# Patient Record
Sex: MALE | Race: BLACK OR AFRICAN AMERICAN | NOT HISPANIC OR LATINO | Employment: OTHER | URBAN - METROPOLITAN AREA
[De-identification: names, ages, dates, MRNs, and addresses within clinical notes are randomized per-mention and may not be internally consistent; named-entity substitution may affect disease eponyms.]

---

## 2017-01-02 ENCOUNTER — LAB VISIT (OUTPATIENT)
Dept: LAB | Facility: HOSPITAL | Age: 67
End: 2017-01-02
Attending: FAMILY MEDICINE
Payer: MEDICARE

## 2017-01-02 DIAGNOSIS — I63.50 UNSPECIFIED CEREBRAL ARTERY OCCLUSION WITH CEREBRAL INFARCTION: ICD-10-CM

## 2017-01-02 DIAGNOSIS — D72.829 LEUKOCYTOSIS: Primary | ICD-10-CM

## 2017-01-02 LAB
BASOPHILS # BLD AUTO: 0 K/UL
BASOPHILS NFR BLD: 0.2 %
DIFFERENTIAL METHOD: ABNORMAL
EOSINOPHIL # BLD AUTO: 0 K/UL
EOSINOPHIL NFR BLD: 0.2 %
ERYTHROCYTE [DISTWIDTH] IN BLOOD BY AUTOMATED COUNT: 17.9 %
HCT VFR BLD AUTO: 33.8 %
HGB BLD-MCNC: 10.8 G/DL
LYMPHOCYTES # BLD AUTO: 3.6 K/UL
LYMPHOCYTES NFR BLD: 16.7 %
MCH RBC QN AUTO: 26.3 PG
MCHC RBC AUTO-ENTMCNC: 31.8 %
MCV RBC AUTO: 83 FL
MONOCYTES # BLD AUTO: 0.4 K/UL
MONOCYTES NFR BLD: 1.8 %
NEUTROPHILS # BLD AUTO: 17.4 K/UL
NEUTROPHILS NFR BLD: 81.1 %
PLATELET # BLD AUTO: 254 K/UL
PMV BLD AUTO: 8.9 FL
RBC # BLD AUTO: 4.09 M/UL
WBC # BLD AUTO: 21.5 K/UL

## 2017-01-02 PROCEDURE — 36415 COLL VENOUS BLD VENIPUNCTURE: CPT

## 2017-01-02 PROCEDURE — 85025 COMPLETE CBC W/AUTO DIFF WBC: CPT

## 2017-02-02 ENCOUNTER — HOSPITAL ENCOUNTER (INPATIENT)
Facility: HOSPITAL | Age: 67
LOS: 12 days | DRG: 870 | End: 2017-02-14
Attending: EMERGENCY MEDICINE | Admitting: INTERNAL MEDICINE
Payer: MEDICARE

## 2017-02-02 DIAGNOSIS — R53.2 FUNCTIONAL QUADRIPLEGIA: ICD-10-CM

## 2017-02-02 DIAGNOSIS — J18.9 PNEUMONIA OF BOTH LOWER LOBES DUE TO INFECTIOUS ORGANISM: ICD-10-CM

## 2017-02-02 DIAGNOSIS — J96.22 ACUTE ON CHRONIC RESPIRATORY FAILURE WITH HYPERCAPNIA: ICD-10-CM

## 2017-02-02 DIAGNOSIS — Z93.0 TRACHEOSTOMY IN PLACE: ICD-10-CM

## 2017-02-02 DIAGNOSIS — G93.1 ANOXIC BRAIN INJURY: ICD-10-CM

## 2017-02-02 DIAGNOSIS — J86.0 TRACHEO-ESOPHAGEAL FISTULA: ICD-10-CM

## 2017-02-02 DIAGNOSIS — Z93.4 JEJUNOSTOMY TUBE IN SITU: ICD-10-CM

## 2017-02-02 DIAGNOSIS — A41.9 SEPSIS, DUE TO UNSPECIFIED ORGANISM: Primary | ICD-10-CM

## 2017-02-02 DIAGNOSIS — J18.9 HCAP (HEALTHCARE-ASSOCIATED PNEUMONIA): ICD-10-CM

## 2017-02-02 DIAGNOSIS — J18.9 PNEUMONIA: ICD-10-CM

## 2017-02-02 DIAGNOSIS — J15.5 PNEUMONIA DUE TO ESCHERICHIA COLI: ICD-10-CM

## 2017-02-02 LAB
ALBUMIN SERPL BCP-MCNC: 2.5 G/DL
ALP SERPL-CCNC: 130 U/L
ALT SERPL W/O P-5'-P-CCNC: 47 U/L
ANION GAP SERPL CALC-SCNC: 9 MMOL/L
ANISOCYTOSIS BLD QL SMEAR: SLIGHT
AST SERPL-CCNC: 35 U/L
BACTERIA #/AREA URNS HPF: ABNORMAL /HPF
BASOPHILS NFR BLD: 0 %
BILIRUB SERPL-MCNC: 1.7 MG/DL
BILIRUB UR QL STRIP: NEGATIVE
BNP SERPL-MCNC: 137 PG/ML
BUN SERPL-MCNC: 40 MG/DL
CALCIUM SERPL-MCNC: 8.7 MG/DL
CHLORIDE SERPL-SCNC: 106 MMOL/L
CLARITY UR: ABNORMAL
CO2 SERPL-SCNC: 25 MMOL/L
COLOR UR: YELLOW
CREAT SERPL-MCNC: 0.8 MG/DL
DIFFERENTIAL METHOD: ABNORMAL
EOSINOPHIL NFR BLD: 0 %
ERYTHROCYTE [DISTWIDTH] IN BLOOD BY AUTOMATED COUNT: 18.7 %
EST. GFR  (AFRICAN AMERICAN): >60 ML/MIN/1.73 M^2
EST. GFR  (NON AFRICAN AMERICAN): >60 ML/MIN/1.73 M^2
GIANT PLATELETS BLD QL SMEAR: PRESENT
GLUCOSE SERPL-MCNC: 132 MG/DL
GLUCOSE UR QL STRIP: NEGATIVE
GRAN CASTS #/AREA URNS LPF: 5 /LPF
HCT VFR BLD AUTO: 31.7 %
HGB BLD-MCNC: 9.8 G/DL
HGB UR QL STRIP: ABNORMAL
HYALINE CASTS #/AREA URNS LPF: 5 /LPF
HYPOCHROMIA BLD QL SMEAR: ABNORMAL
KETONES UR QL STRIP: NEGATIVE
LACTATE SERPL-SCNC: 1.5 MMOL/L
LEUKOCYTE ESTERASE UR QL STRIP: NEGATIVE
LYMPHOCYTES NFR BLD: 15 %
MCH RBC QN AUTO: 25.7 PG
MCHC RBC AUTO-ENTMCNC: 31 %
MCV RBC AUTO: 83 FL
METAMYELOCYTES NFR BLD MANUAL: 3 %
MICROSCOPIC COMMENT: ABNORMAL
MONOCYTES NFR BLD: 3 %
NEUTROPHILS NFR BLD: 64 %
NEUTS BAND NFR BLD MANUAL: 15 %
NITRITE UR QL STRIP: NEGATIVE
PH UR STRIP: 6 [PH] (ref 5–8)
PLATELET # BLD AUTO: 236 K/UL
PLATELET BLD QL SMEAR: ABNORMAL
PMV BLD AUTO: 8.6 FL
POTASSIUM SERPL-SCNC: 4.1 MMOL/L
PROT SERPL-MCNC: 8.6 G/DL
PROT UR QL STRIP: ABNORMAL
RBC # BLD AUTO: 3.83 M/UL
RBC #/AREA URNS HPF: 10 /HPF (ref 0–4)
SODIUM SERPL-SCNC: 140 MMOL/L
SP GR UR STRIP: 1.01 (ref 1–1.03)
URN SPEC COLLECT METH UR: ABNORMAL
UROBILINOGEN UR STRIP-ACNC: ABNORMAL EU/DL
WBC # BLD AUTO: 18.5 K/UL
WBC #/AREA URNS HPF: 3 /HPF (ref 0–5)

## 2017-02-02 PROCEDURE — 93005 ELECTROCARDIOGRAM TRACING: CPT

## 2017-02-02 PROCEDURE — 36415 COLL VENOUS BLD VENIPUNCTURE: CPT

## 2017-02-02 PROCEDURE — 96365 THER/PROPH/DIAG IV INF INIT: CPT

## 2017-02-02 PROCEDURE — 96368 THER/DIAG CONCURRENT INF: CPT

## 2017-02-02 PROCEDURE — 85007 BL SMEAR W/DIFF WBC COUNT: CPT

## 2017-02-02 PROCEDURE — 5A1955Z RESPIRATORY VENTILATION, GREATER THAN 96 CONSECUTIVE HOURS: ICD-10-PCS | Performed by: INTERNAL MEDICINE

## 2017-02-02 PROCEDURE — 99223 1ST HOSP IP/OBS HIGH 75: CPT | Mod: AI,,, | Performed by: INTERNAL MEDICINE

## 2017-02-02 PROCEDURE — 63600175 PHARM REV CODE 636 W HCPCS: Performed by: INTERNAL MEDICINE

## 2017-02-02 PROCEDURE — 94761 N-INVAS EAR/PLS OXIMETRY MLT: CPT

## 2017-02-02 PROCEDURE — 83605 ASSAY OF LACTIC ACID: CPT

## 2017-02-02 PROCEDURE — 63600175 PHARM REV CODE 636 W HCPCS: Performed by: EMERGENCY MEDICINE

## 2017-02-02 PROCEDURE — 85027 COMPLETE CBC AUTOMATED: CPT

## 2017-02-02 PROCEDURE — 87040 BLOOD CULTURE FOR BACTERIA: CPT | Mod: 59

## 2017-02-02 PROCEDURE — 94002 VENT MGMT INPAT INIT DAY: CPT

## 2017-02-02 PROCEDURE — 80053 COMPREHEN METABOLIC PANEL: CPT

## 2017-02-02 PROCEDURE — 83880 ASSAY OF NATRIURETIC PEPTIDE: CPT

## 2017-02-02 PROCEDURE — 20000000 HC ICU ROOM

## 2017-02-02 PROCEDURE — 25000003 PHARM REV CODE 250: Performed by: EMERGENCY MEDICINE

## 2017-02-02 PROCEDURE — 27000221 HC OXYGEN, UP TO 24 HOURS

## 2017-02-02 PROCEDURE — 99285 EMERGENCY DEPT VISIT HI MDM: CPT | Mod: 25

## 2017-02-02 PROCEDURE — 81000 URINALYSIS NONAUTO W/SCOPE: CPT

## 2017-02-02 PROCEDURE — 96372 THER/PROPH/DIAG INJ SC/IM: CPT

## 2017-02-02 PROCEDURE — 87086 URINE CULTURE/COLONY COUNT: CPT

## 2017-02-02 RX ORDER — MEROPENEM AND SODIUM CHLORIDE 500 MG/50ML
500 INJECTION, SOLUTION INTRAVENOUS
Status: COMPLETED | OUTPATIENT
Start: 2017-02-02 | End: 2017-02-02

## 2017-02-02 RX ORDER — ENOXAPARIN SODIUM 100 MG/ML
40 INJECTION SUBCUTANEOUS EVERY 24 HOURS
Status: DISCONTINUED | OUTPATIENT
Start: 2017-02-02 | End: 2017-02-02 | Stop reason: SDUPTHER

## 2017-02-02 RX ORDER — CEFEPIME HYDROCHLORIDE 2 G/50ML
2 INJECTION, SOLUTION INTRAVENOUS
Status: COMPLETED | OUTPATIENT
Start: 2017-02-02 | End: 2017-02-02

## 2017-02-02 RX ORDER — ENOXAPARIN SODIUM 100 MG/ML
40 INJECTION SUBCUTANEOUS EVERY 24 HOURS
Status: DISCONTINUED | OUTPATIENT
Start: 2017-02-03 | End: 2017-02-14 | Stop reason: HOSPADM

## 2017-02-02 RX ORDER — MEROPENEM AND SODIUM CHLORIDE 1 G/50ML
1 INJECTION, SOLUTION INTRAVENOUS
Status: DISCONTINUED | OUTPATIENT
Start: 2017-02-03 | End: 2017-02-09

## 2017-02-02 RX ORDER — SODIUM CHLORIDE 9 MG/ML
INJECTION, SOLUTION INTRAVENOUS CONTINUOUS
Status: DISCONTINUED | OUTPATIENT
Start: 2017-02-02 | End: 2017-02-04

## 2017-02-02 RX ORDER — CIPROFLOXACIN 2 MG/ML
400 INJECTION, SOLUTION INTRAVENOUS
Status: DISCONTINUED | OUTPATIENT
Start: 2017-02-02 | End: 2017-02-04

## 2017-02-02 RX ORDER — BUTYROSPERMUM PARKII(SHEA BUTTER), SIMMONDSIA CHINENSIS (JOJOBA) SEED OIL, ALOE BARBADENSIS LEAF EXTRACT .01; 1; 3.5 G/100G; G/100G; G/100G
250 LIQUID TOPICAL 2 TIMES DAILY
COMMUNITY

## 2017-02-02 RX ADMIN — CEFEPIME HYDROCHLORIDE 2 G: 2 INJECTION, SOLUTION INTRAVENOUS at 06:02

## 2017-02-02 RX ADMIN — SODIUM CHLORIDE: 0.9 INJECTION, SOLUTION INTRAVENOUS at 07:02

## 2017-02-02 RX ADMIN — GENTAMICIN SULFATE 356 MG: 40 INJECTION, SOLUTION INTRAMUSCULAR; INTRAVENOUS at 04:02

## 2017-02-02 RX ADMIN — MEROPENEM AND SODIUM CHLORIDE 500 MG: 500 INJECTION, SOLUTION INTRAVENOUS at 04:02

## 2017-02-02 RX ADMIN — ENOXAPARIN SODIUM 40 MG: 40 INJECTION SUBCUTANEOUS at 04:02

## 2017-02-02 RX ADMIN — VANCOMYCIN HYDROCHLORIDE 1000 MG: 1 INJECTION, POWDER, LYOPHILIZED, FOR SOLUTION INTRAVENOUS at 10:02

## 2017-02-02 RX ADMIN — SODIUM CHLORIDE 1000 ML: 0.9 INJECTION, SOLUTION INTRAVENOUS at 04:02

## 2017-02-02 RX ADMIN — CIPROFLOXACIN 400 MG: 2 INJECTION, SOLUTION INTRAVENOUS at 08:02

## 2017-02-02 NOTE — ED PROVIDER NOTES
Encounter Date: 2/2/2017    SCRIBE #1 NOTE: Bridgette RICH am scribing for, and in the presence of, Dr. Hernández.       History     Chief Complaint   Patient presents with    Pneumonia     possible aspiration pneumonia     Review of patient's allergies indicates:  No Known Allergies  HPI Comments: 2/2/2017  3:37 PM     Chief Complaint: Pneumonia    The patient is a 66 y.o. male with PMHx of DM, HTN, seizures, stroke, anoxic brain injury, anemia, CKD and ventilator dependent who presents with pneumonia. EMS reports patient had worsening chest XRs which showed bilateral pneumonia. Patient has a temperature of 100.8 degrees F. Patient was sent over for further evaluation of possible aspiration. Shx of tracheostomy tube placement, gastrostomy tube placement, jejunostomy.    The history is provided by the EMS personnel.     Past Medical History   Diagnosis Date    Anoxic brain injury 7/25/2015     during cardiac arrest    Chronic kidney disease     Decubitus ulcer of ankle, unstageable      outer right ankle    Decubitus ulcer of sacral region, stage 3     Decubitus ulcer, unstageable      left ear upper with blister to lower ear lobe    Diabetes mellitus      type II    Dissecting aneurysm of thoracoabdominal aorta 7/25/2015    Encounter for blood transfusion     H/O renal calculi     History of ATN      r/t hypoperfusion    Hyperlipidemia     Hypertension     Seizures     Severe anemia     Stroke 7/25/2015     bilateral watershed infarcts    Ventilator dependent      No past medical history pertinent negatives.  Past Surgical History   Procedure Laterality Date    Tracheostomy tube placement      Gastrostomy tube placement      Colonoscopy N/A 5/16/2016     Procedure: COLONOSCOPY;  Surgeon: Wei Moncada MD;  Location: [facility];  Service: Endoscopy;  Laterality: N/A;    Jejunostomy       History reviewed. No pertinent family history.  Social History   Substance Use Topics    Smoking status:  Never Smoker    Smokeless tobacco: None    Alcohol use No     Review of Systems   Unable to perform ROS: Patient nonverbal       Physical Exam   Initial Vitals   BP Pulse Resp Temp SpO2   02/02/17 1524 02/02/17 1524 02/02/17 1524 02/02/17 1524 02/02/17 1524   92/57 78 30 100.7 °F (38.2 °C) 97 %     Physical Exam    Nursing note and vitals reviewed.  Constitutional: He appears distressed (severe).   HENT:   Head: Normocephalic and atraumatic.   Mouth/Throat: Mucous membranes are normal.   Eyes: EOM are normal. Pupils are equal, round, and reactive to light.   Neck: A tracheostomy is present.   Pulmonary/Chest: He has rhonchi (bilateral rhonchus sounds).   Abdominal:   Multiple abdominal tubes.   Musculoskeletal: He exhibits no edema.   Contractures to all four extremities.   Neurological:   Not oriented. Pt is at mental baseline.   Skin: Skin is dry. No rash noted. No erythema.         ED Course   Critical Care  Date/Time: 2/5/2017 11:11 AM  Performed by: WYATT ALMEIDA III  Authorized by: QASIM JOLLEY   Direct patient critical care time: 20 minutes  Additional history critical care time: 5 minutes  Ordering / reviewing critical care time: 5 minutes  Documentation critical care time: 5 minutes  Consulting other physicians critical care time: 5 minutes  Total critical care time (exclusive of procedural time) : 40 minutes  Critical care was necessary to treat or prevent imminent or life-threatening deterioration of the following conditions: sepsis and shock.  Critical care was time spent personally by me on the following activities: discussions with consultants, evaluation of patient's response to treatment, obtaining history from patient or surrogate, ordering and review of laboratory studies, pulse oximetry, review of old charts, ventilator management, re-evaluation of patient's condition, ordering and review of radiographic studies, ordering and performing treatments and interventions, examination of patient,  interpretation of cardiac output measurements and development of treatment plan with patient or surrogate.  Comments: Pt required numerous evals of his shock-like status during his course in the ED for his life-threatening sepsis secondary to bilateral pneumonias.        Labs Reviewed   B-TYPE NATRIURETIC PEPTIDE - Abnormal; Notable for the following:        Result Value     (*)     All other components within normal limits   CBC W/ AUTO DIFFERENTIAL - Abnormal; Notable for the following:     WBC 18.50 (*)     RBC 3.83 (*)     Hemoglobin 9.8 (*)     Hematocrit 31.7 (*)     MCH 25.7 (*)     MCHC 31.0 (*)     RDW 18.7 (*)     MPV 8.6 (*)     Lymph% 15.0 (*)     Mono% 3.0 (*)     All other components within normal limits   COMPREHENSIVE METABOLIC PANEL - Abnormal; Notable for the following:     Glucose 132 (*)     BUN, Bld 40 (*)     Total Protein 8.6 (*)     Albumin 2.5 (*)     Total Bilirubin 1.7 (*)     ALT 47 (*)     All other components within normal limits   URINALYSIS - Abnormal; Notable for the following:     Appearance, UA Hazy (*)     Protein, UA 1+ (*)     Occult Blood UA 1+ (*)     Urobilinogen, UA 2.0-3.0 (*)     All other components within normal limits   URINALYSIS MICROSCOPIC - Abnormal; Notable for the following:     RBC, UA 10 (*)     Bacteria, UA Moderate (*)     Hyaline Casts, UA 5 (*)     Granular Casts, UA 5 (*)     All other components within normal limits   LACTIC ACID, PLASMA     EKG Readings: (Independently Interpreted)   Normal sinus rhythm with rate of 73. Normal axis and intervals. T wave inversions anteriorly. No st elevation or depression.          Medical Decision Making:   History:   I obtained history from: EMS provider.  Old Medical Records: I decided to obtain old medical records.  Initial Assessment:   Emilie Proctor Sr. is a 66 y.o. male who presents to the Emergency Department with worsening CXR from long term rehab facility with fever. Will admit to ICU for IV  antibiotics and sepsis management.   Differential Diagnosis:   Sepsis, aspiration, pneumonia, CHF.  Independently Interpreted Test(s):   I have ordered and independently interpreted X-rays - see summary below.       <> Summary of X-Ray Reading(s): CXR: bilateral infiltrative processes  I have ordered and independently interpreted EKG Reading(s) - see prior notes  Clinical Tests:   Lab Tests: Ordered and Reviewed  Radiological Study: Ordered  Medical Tests: Ordered  Other:   I have discussed this case with another health care provider.       <> Summary of the Discussion: IM            Scribe Attestation:   Scribe #1: I performed the above scribed service and the documentation accurately describes the services I performed. I attest to the accuracy of the note.    Attending Attestation:           Physician Attestation for Scribe:  Physician Attestation Statement for Scribe #1: I, Dr. Hernández, reviewed documentation, as scribed by Bridgette Norman in my presence, and it is both accurate and complete.                 ED Course     Clinical Impression:   The primary encounter diagnosis was Sepsis, due to unspecified organism. Diagnoses of Pneumonia, HCAP (healthcare-associated pneumonia), Acute on chronic respiratory failure with hypercapnia, Anoxic brain injury, and Pneumonia of both lower lobes due to infectious organism were also pertinent to this visit.          Casper Hernández III, MD  02/05/17 9622

## 2017-02-02 NOTE — SIGNIFICANT EVENT
Vent dependent patient received from [facility], placed on  vent with home settings, 7.0 XLT trach secure and sx'd for patency.  Alarms on and functioning, ambu at bedside.

## 2017-02-02 NOTE — H&P
"PCP: Marcial Avila MD    History & Physical    Chief Complaint: "Pneumonia"    History of Present Illness:  Patient is a 66 y.o. male admitted to Hospitalist Service from [facility] with complaint of "Pneumonia". Patient has PMH significant for anoxic brain injury, hypertension, seizures, hyperlipidemia, chronic respiratory failure, history of alcohol and drug abuse, history of CVA and DM-2. EMS reported patient had worsening chest XRs which showed bilateral pneumonia. Patient has a temperature of 100.8 degrees F. Patient was sent over for further evaluation of possible aspiration. Patient not able to communicate. Looks ill and exhausted.    Past Medical History   Diagnosis Date    Anoxic brain injury 7/25/2015     during cardiac arrest    Chronic kidney disease     Decubitus ulcer of ankle, unstageable      outer right ankle    Decubitus ulcer of sacral region, stage 3     Decubitus ulcer, unstageable      left ear upper with blister to lower ear lobe    Diabetes mellitus      type II    Dissecting aneurysm of thoracoabdominal aorta 7/25/2015    Encounter for blood transfusion     H/O renal calculi     History of ATN      r/t hypoperfusion    Hyperlipidemia     Hypertension     Seizures     Severe anemia     Stroke 7/25/2015     bilateral watershed infarcts    Ventilator dependent      Past Surgical History   Procedure Laterality Date    Tracheostomy tube placement      Gastrostomy tube placement      Colonoscopy N/A 5/16/2016     Procedure: COLONOSCOPY;  Surgeon: Wei Moncada MD;  Location: [facility];  Service: Endoscopy;  Laterality: N/A;    Jejunostomy       History reviewed. No pertinent family history.  Social History   Substance Use Topics    Smoking status: Never Smoker    Smokeless tobacco: None    Alcohol use No      Review of patient's allergies indicates:  No Known Allergies    (Not in a hospital admission)  Review of Systems: Unable to obtain due to patient's " condition.     OBJECTIVE:     Vital Signs (Most Recent)  Temp: (!) 100.7 °F (38.2 °C) (02/02/17 1524)  Pulse: 78 (02/02/17 1524)  Resp: (!) 30 (02/02/17 1524)  BP: (!) 92/57 (02/02/17 1524)  SpO2: 97 % (02/02/17 1524)    Physical Exam:  General appearance: well developed, appears stated age, chronically ill appearing  Head: normocephalic, atraumatic  Eyes: conjunctivae/corneas clear. PERRL.  Nose: Nares normal. Septum midline.  Throat: lips, mucosa, and tongue normal; teeth and gums normal, no throat erythema.  Neck: supple, symmetrical, trachea midline, no JVD and thyroid not enlarged, symmetric, no tenderness/mass/nodules. Trach noted.  Lungs: clear to auscultation bilaterally and normal respiratory effort  Chest wall: no tenderness  Heart: regular rate and rhythm, S1, S2 normal, no murmur, click, rub or gallop  Abdomen: soft, non-tender non-distented; bowel sounds normal; no masses, no organomegaly. PEG and J tube site with scant discharge  Extremities: no cyanosis, clubbing or edema.   Pulses: 2+ and symmetric  Skin: Skin color, texture, turgor normal. Stage 2 sacral decubitus (refer to nursing assessment)  Lymph nodes: Cervical, supraclavicular, and axillary nodes normal.  Neurologic: Anoxic brain injury. Non-interactive.    Laboratory:   CBC: No results for input(s): WBC, RBC, HGB, HCT, PLT, MCV, MCH, MCHC in the last 168 hours.  BMP: No results for input(s): GLU, NA, K, CL, CO2, BUN, CREATININE, CALCIUM, MG in the last 168 hours.    Hemoglobin A1C   Date Value Ref Range Status   08/20/2016 5.6 4.5 - 6.2 % Final     Comment:     According to ADA guidelines, hemoglobin A1C <7.0% represents  optimal control in non-pregnant diabetic patients.  Different  metrics may apply to specific populations.   Standards of Medical Care in Diabetes - 2016.  For the purpose of screening for the presence of diabetes:  <5.7%     Consistent with the absence of diabetes  5.7-6.4%  Consistent with increasing risk for diabetes    (prediabetes)  >or=6.5%  Consistent with diabetes  Currently no consensus exists for use of hemoglobin A1C  for diagnosis of diabetes for children.     11/05/2015 5.1 4.5 - 6.2 % Final     Microbiology Results (last 7 days)     Procedure Component Value Units Date/Time    Blood culture #1 **CANNOT BE ORDERED STAT** [[id number]] Collected:  02/02/17 1613    Order Status:  Sent Specimen:  Blood Updated:  02/02/17 1614    Blood culture #2 **CANNOT BE ORDERED STAT** [[id number]]     Order Status:  Sent Specimen:  Blood     Urine culture **CANNOT BE ORDERED STAT** [[id number]]     Order Status:  No result Specimen:  Urine         Diagnostic Results:  Chest X-Ray: 1.  Stable radiographic appearance to the chest.  Persistent bilateral pulmonary opacities are noted for which differential considerations include pneumonia and aspiration in this patient with history of esophageal stent.  Small left pleural effusion noted.  2.  Stable dilatation of the aorta in this patient with known type B thoracic dissection.    Assessment/Plan:   Sepsis with HCAP  HCAP  Supplemental O2 via nasal canula; titrate O2 saturation to >92%.   Pulmonary consultation.   Continue beta 2 agonist bronchodilator treatments.   Continue IV antibiotics - Merrem, Cipro and Vancomycin.  Pharmacy to dose Vanco.  Check sputum GS and Cx.   Continue routine medications as before.     Chronic respiratory failure  Continue mechanical ventilation  Continuous oximetry  Nebulized bronchodilator      Hemiplegia as late effect of stroke  Turn and position     Tracheostomy in place  Continue routine tracheostomy care      Functional quadriplegia  Turn and position     Gastrostomy status  Keep g tube open to gravity     Seizure disorder  Continue Levetiracetam, check level.     VTE Risk Mitigation         Ordered     enoxaparin injection 40 mg  Daily     Route:  Subcutaneous        02/02/17 7470        Socorro Chicas MD  Department of Hospital Medicine   [facility]  [facility]

## 2017-02-02 NOTE — IP AVS SNAPSHOT
[address] [place] [address]  Phone: [phone]           Patient Discharge Instructions     Our goal is to set you up for success. This packet includes information on your condition, medications, and your home care. It will help you to care for yourself so you don't get sicker and need to go back to the hospital.     Please ask your nurse if you have any questions.        There are many details to remember when preparing to leave the hospital. Here is what you will need to do:    1. Take your medicine. If you are prescribed medications, review your Medication List in the following pages. You may have new medications to  at the pharmacy and others that you'll need to stop taking. Review the instructions for how and when to take your medications. Talk with your doctor or nurses if you are unsure of what to do.     2. Go to your follow-up appointments. Specific follow-up information is listed in the following pages. Your may be contacted by a transition nurse or clinical provider about future appointments. Be sure we have all of the phone numbers to reach you, if needed. Please contact your provider's office if you are unable to make an appointment.     3. Watch for warning signs. Your doctor or nurse will give you detailed warning signs to watch for and when to call for assistance. These instructions may also include educational information about your condition. If you experience any of warning signs to your health, call your doctor.               ** Verify the list of medication(s) below is accurate and up to date. Carry this with you in case of emergency. If your medications have changed, please notify your healthcare provider.             Medication List      START taking these medications        Additional Info                      ceFEPIme in dextrose 5% 2 gram/50 mL Pgbk   Commonly known as:  MAXIPIME   Quantity:  1000 mL   Refills:  0   Dose:  2 g    Indications:  Pneumonia    Last time this was given:  2 g on 2/14/2017  6:07 AM   Instructions:  Inject 50 mLs (2 g total) into the vein every 12 (twelve) hours.     Begin Date    AM    Noon    PM    Bedtime       dextrose 5 % SolP 100 mL with tigecycline 50 mg SolR 50 mg   Refills:  0   Dose:  50 mg   Indications:  Pneumonia    Last time this was given:  50 mg on 2/14/2017  6:07 AM   Instructions:  Inject 50 mg into the vein every 12 (twelve) hours.     Begin Date    AM    Noon    PM    Bedtime       enoxaparin 40 mg/0.4 mL Syrg   Commonly known as:  LOVENOX   Refills:  0   Dose:  40 mg    Last time this was given:  40 mg on 2/14/2017  1:07 PM   Instructions:  Inject 0.4 mLs (40 mg total) into the skin once daily.     Begin Date    AM    Noon    PM    Bedtime       famotidine 40 mg/5 mL (8 mg/mL) suspension   Commonly known as:  PEPCID   Refills:  0   Dose:  20 mg    Instructions:  2.5 mLs (20 mg total) by Per J Tube route 2 (two) times daily.     Begin Date    AM    Noon    PM    Bedtime         CHANGE how you take these medications        Additional Info                      * miconazole NITRATE 2 % 2 % top powder   Commonly known as:  MICOTIN   Refills:  0   What changed:  Another medication with the same name was added. Make sure you understand how and when to take each.    Instructions:  Apply topically to perineal area BID     Begin Date    AM    Noon    PM    Bedtime       * miconazole nitrate 2% 2 % Oint   Commonly known as:  MICOTIN   Refills:  0   What changed:  You were already taking a medication with the same name, and this prescription was added. Make sure you understand how and when to take each.    Last time this was given:  2/14/2017  9:54 AM   Instructions:  Apply topically 2 (two) times daily. Apply to perineal areas     Begin Date    AM    Noon    PM    Bedtime       * Notice:  This list has 2 medication(s) that are the same as other medications prescribed for you. Read the directions  carefully, and ask your doctor or other care provider to review them with you.      CONTINUE taking these medications        Additional Info                      acetaminophen 650 MG Tbsr   Commonly known as:  TYLENOL   Refills:  0   Dose:  650 mg    Instructions:  1 tablet (650 mg total) by Per J Tube route every 6 (six) hours as needed (fever).     Begin Date    AM    Noon    PM    Bedtime       acetylcysteine 200 mg/ml (20%) 200 mg/mL (20 %) nebulizer solution   Commonly known as:  MUCOMYST   Refills:  0   Dose:  4 mL    Instructions:  Take 4 mLs by nebulization every 6 (six) hours.     Begin Date    AM    Noon    PM    Bedtime       albuterol 1.25 mg/3 mL Nebu   Commonly known as:  ACCUNEB   Refills:  0   Dose:  2.5 mg    Instructions:  Take 2.5 mg by nebulization every 6 (six) hours.     Begin Date    AM    Noon    PM    Bedtime       amlodipine 10 MG tablet   Commonly known as:  NORVASC   Refills:  11   Dose:  10 mg    Instructions:  1 tablet (10 mg total) by Per J Tube route once daily.     Begin Date    AM    Noon    PM    Bedtime       bisacodyl 10 mg Supp   Commonly known as:  DULCOLAX   Refills:  0   Dose:  10 mg    Instructions:  Place 1 suppository (10 mg total) rectally daily as needed (daily as needed to ensure at least one bowel movement each day).     Begin Date    AM    Noon    PM    Bedtime       carvedilol 12.5 MG tablet   Commonly known as:  COREG   Quantity:  60 tablet   Refills:  11   Dose:  12.5 mg    Last time this was given:  12.5 mg on 2/14/2017  9:51 AM   Instructions:  1 tablet (12.5 mg total) by Per J Tube route 2 (two) times daily.     Begin Date    AM    Noon    PM    Bedtime       chlorhexidine 0.12 % solution   Commonly known as:  PERIDEX   Refills:  0   Dose:  15 mL    Last time this was given:  15 mLs on 2/14/2017  9:51 AM   Instructions:  Use as directed 15 mLs in the mouth or throat 2 (two) times daily.     Begin Date    AM    Noon    PM    Bedtime       ferrous sulfate 300 mg  (60 mg iron)/5 mL syrup   Refills:  0   Dose:  300 mg    Last time this was given:  300 mg on 2/14/2017  9:51 AM   Instructions:  5 mLs (300 mg total) by Per J Tube route 2 (two) times daily. Give with vitamin c.     Begin Date    AM    Noon    PM    Bedtime       glycopyrrolate 1 mg Tab   Commonly known as:  ROBINUL   Refills:  0   Dose:  1 mg    Last time this was given:  1 mg on 2/14/2017  6:07 AM   Instructions:  1 tablet (1 mg total) by Per J Tube route 3 (three) times daily.     Begin Date    AM    Noon    PM    Bedtime       levetiracetam 100 mg/mL Soln   Commonly known as:  KEPPRA   Refills:  0   Dose:  500 mg    Last time this was given:  500 mg on 2/14/2017  9:51 AM   Instructions:  5 mLs (500 mg total) by Per J Tube route 2 (two) times daily.     Begin Date    AM    Noon    PM    Bedtime       lidocaine (PF) 200 mg/20 mL (1 %) Syrg   Refills:  0   Dose:  2 mL    Instructions:  Take 2 mLs by nebulization every 6 (six) hours as needed (cough).     Begin Date    AM    Noon    PM    Bedtime       polyethylene glycol 17 gram Pwpk   Commonly known as:  GLYCOLAX   Refills:  0   Dose:  17 g    Last time this was given:  17 g on 2/14/2017  9:51 AM   Instructions:  17 g by Per J Tube route once daily.     Begin Date    AM    Noon    PM    Bedtime       rosuvastatin 5 MG tablet   Commonly known as:  CRESTOR   Refills:  0   Dose:  5 mg    Last time this was given:  5 mg on 2/14/2017  9:52 AM   Instructions:  1 tablet (5 mg total) by Per J Tube route once daily.     Begin Date    AM    Noon    PM    Bedtime       Saccharomyces boulardii 250 mg capsule   Commonly known as:  FLORASTOR   Refills:  0   Dose:  250 mg    Instructions:  250 mg by Per G Tube route 2 (two) times daily.     Begin Date    AM    Noon    PM    Bedtime       selenium sulfide 1 % Sham   Commonly known as:  SELSUN   Refills:  0    Instructions:  Three times weekly with bath.  Apply to hair.     Begin Date    AM    Noon    PM    Bedtime             Where to Get Your Medications      You can get these medications from any pharmacy     You don't need a prescription for these medications     miconazole nitrate 2% 2 % Oint         Information about where to get these medications is not yet available     ! Ask your nurse or doctor about these medications     ceFEPIme in dextrose 5% 2 gram/50 mL Pgbk    dextrose 5 % SolP 100 mL with tigecycline 50 mg SolR 50 mg    enoxaparin 40 mg/0.4 mL Syrg    famotidine 40 mg/5 mL (8 mg/mL) suspension                  Please bring to all follow up appointments:    1. A copy of your discharge instructions.  2. All medicines you are currently taking in their original bottles.  3. Identification and insurance card.    Please arrive 15 minutes ahead of scheduled appointment time.    Please call 24 hours in advance if you must reschedule your appointment and/or time.        Follow-up Information     Follow up with [facility].    Specialties:  SNF Agency, Nursing Home Agency    Why:  Nursing Home, nursing home    Contact information:    [address] [place] [address]  [phone]          Follow up with Marcial Avila MD In 1 week.    Specialty:  Family Medicine    Contact information:    [address] [place] [address]  [phone]          Discharge Instructions     Future Orders    Call MD for:     Comments:    For worsening symptoms, chest pain, shortness of breath, increased abdominal pain, high grade fever, stroke or stroke like symptoms, immediately go to the nearest Emergency Room or call 911 as soon as possible.    Diet general     Comments:    J-tube feeding: Dibetasource AC at 65 cc/hr via J- tube.  Water 60 cc flushes q 6 hrs    Questions:    Total calories:      Fat restriction, if any:      Protein restriction, if any:      Na restriction, if any:      Fluid restriction:      Additional restrictions:      Other restrictions (specify):     Scheduling Instructions:    Skin  care - rotate patient q 2 hrs    Comments:    Fall precautions        Discharge Instructions       Admit to [place]     Dx-   Patient Active Problem List   Diagnosis    Aortic dissection distal to left subclavian    Hemiplegia as late effect of stroke    Tracheostomy in place    Functional quadriplegia    Jejunostomy tube in situ    Tracheo-esophageal fistula    Anemia, chronic disease    Pneumonia due to Escherichia coli    Acute on chronic respiratory failure with hypercapnia    Gastrostomy status    Protein-calorie malnutrition, moderate    Seizure disorder    History of CVA (cerebrovascular accident)    Anoxic brain injury    Aspiration into airway    Abnormal CXR    Malfunctioning jejunostomy tube    Acute cystitis with hematuria    Chronic respiratory failure    Transaminitis    Sepsis    Pneumonia of both lower lobes due to infectious organism     Diet- NPO    TF- Diabetasource AC @65cc/hr with 60cc H2O flushes q4  Aspiration Precautions   Keep HOB @30 degrees     Vent Mode: A/C  Oxygen Concentration (%):  [30] 30  Resp Rate Total:  [22 br/min-26 br/min] 22 br/min  Vt Set:  [500 mL] 500 mL  PEEP/CPAP:  [5 cmH20] 5 cmH20  Pressure Support:  [0 cmH20] 0 cmH20  Mean Airway Pressure:  [10 heB84-38 cmH20] 11 cmH20    PICC line care and dressing changes per protocol     Turn q2 while in bed    Tygacil 50mg IV q12 and Cefepime 2gm IV q12 x14 days; stop date- 2/2/17; Dx- Pneumonia due to Escherichia coli             Primary Diagnosis     Your primary diagnosis was:  Pneumonia Due To E. Coli Bacteria      Admission Information     Date & Time Provider Department [facility]    2/2/2017  3:20 PM Socorro Chicas MD [facility] [id number]      Care Providers     Provider Role Specialty Primary office phone    Socorro Chicas MD Attending Provider Internal Medicine [phone]    Cash Noonan MD Consulting Physician  Pulmonary Disease [phone]    Lyn Naylor MD Consulting Physician   "Infectious Diseases [phone]      Important Medicare Message          Most Recent Value    Important Message from Medicare Regarding Discharge Appeal Rights  Signed/date by patient/caregiver [pt medically unable to sign,  brain injury ] yes 02/14/2017 0940      Your Vitals Were     BP Pulse Temp Resp Height Weight    107/70 62 97.6 °F (36.4 °C) (Axillary) 22 5' 9" (1.753 m) 69.2 kg (152 lb 8.9 oz)    SpO2 BMI             98% 22.53 kg/m2         Recent Lab Values        11/5/2015 8/20/2016                        8:29 PM 12:30 AM          A1C 5.1 5.6          Comment for A1C at 12:30 AM on 8/20/2016:  According to ADA guidelines, hemoglobin A1C <7.0% represents  optimal control in non-pregnant diabetic patients.  Different  metrics may apply to specific populations.   Standards of Medical Care in Diabetes - 2016.  For the purpose of screening for the presence of diabetes:  <5.7%     Consistent with the absence of diabetes  5.7-6.4%  Consistent with increasing risk for diabetes   (prediabetes)  >or=6.5%  Consistent with diabetes  Currently no consensus exists for use of hemoglobin A1C  for diagnosis of diabetes for children.        Allergies as of 2/14/2017     No Known Allergies      [facility]     [facility] Nurse Care Line - 24/7 Assistance  Unless otherwise directed by your provider, please contact [facility], our nurse care line that is available for 24/7 assistance.     Registered nurses in the [facility] provide clinical advisement, health education, appointment booking, and other advisory services.  Call for this free service at [phone].        Advance Directives     An advance directive is a document which, in the event you are no longer able to make decisions for yourself, tells your healthcare team what kind of treatment you do or do not want to receive, or who you would like to make those decisions for you.  If you do not currently have an advance directive, [facility] " encourages you to create one.  For more information call:  [phone]), [phone] ([phone]),  or log on to [email or web address].        Language Assistance Services     ATTENTION: Language assistance services are available, free of charge. Please call [phone].      ATENCIÓN: Si habla epifanio, tiene a small disposición servicios gratuitos de asistencia lingüística. Llame al [phone].     CHÚ Ý: N?u b?n nói Ti?ng Vi?t, có các d?ch v? h? tr? ngôn ng? mi?n phí dành cho b?n. G?i s? [phone].        Pneumonmia Discharge Instructions                [facility] Sign-Up     Activating your [facility] account is as easy as 1-2-3!     1) Visit [email or web address], select Sign Up Now, enter this activation code and your date of birth, then select Next.  [email or web address]  Expires: 3/31/2017  2:07 PM      2) Create a username and password to use when you visit [facility] in the future and select a security question in case you lose your password and select Next.    3) Enter your e-mail address and click Sign Up!    Additional Information  If you have questions, please e-mail [email or web address] or call [phone] to talk to our [facility] staff. Remember, [facility] is NOT to be used for urgent needs. For medical emergencies, dial 911.          [facility] complies with applicable Federal civil rights laws and does not discriminate on the basis of race, color, national origin, age, disability, or sex.

## 2017-02-03 LAB
ALLENS TEST: ABNORMAL
ANION GAP SERPL CALC-SCNC: 7 MMOL/L
BASOPHILS # BLD AUTO: 0 K/UL
BASOPHILS NFR BLD: 0.1 %
BUN SERPL-MCNC: 36 MG/DL
CALCIUM SERPL-MCNC: 8.2 MG/DL
CHLORIDE SERPL-SCNC: 108 MMOL/L
CO2 SERPL-SCNC: 24 MMOL/L
CREAT SERPL-MCNC: 0.8 MG/DL
DELSYS: ABNORMAL
DIFFERENTIAL METHOD: ABNORMAL
EOSINOPHIL # BLD AUTO: 0 K/UL
EOSINOPHIL NFR BLD: 0 %
ERYTHROCYTE [DISTWIDTH] IN BLOOD BY AUTOMATED COUNT: 18.7 %
ERYTHROCYTE [SEDIMENTATION RATE] IN BLOOD BY WESTERGREN METHOD: 22 MM/H
EST. GFR  (AFRICAN AMERICAN): >60 ML/MIN/1.73 M^2
EST. GFR  (NON AFRICAN AMERICAN): >60 ML/MIN/1.73 M^2
ETCO2: 29
FIO2: 40
GLUCOSE SERPL-MCNC: 95 MG/DL
HCO3 UR-SCNC: 25.3 MMOL/L (ref 24–28)
HCT VFR BLD AUTO: 28.6 %
HGB BLD-MCNC: 9.2 G/DL
LYMPHOCYTES # BLD AUTO: 3 K/UL
LYMPHOCYTES NFR BLD: 23.1 %
MCH RBC QN AUTO: 26.6 PG
MCHC RBC AUTO-ENTMCNC: 32.2 %
MCV RBC AUTO: 83 FL
MODE: ABNORMAL
MONOCYTES # BLD AUTO: 0.5 K/UL
MONOCYTES NFR BLD: 4.1 %
NEUTROPHILS # BLD AUTO: 9.5 K/UL
NEUTROPHILS NFR BLD: 72.7 %
PCO2 BLDA: 34.7 MMHG (ref 35–45)
PEEP: 7
PH SMN: 7.47 [PH] (ref 7.35–7.45)
PIP: 25
PLATELET # BLD AUTO: 205 K/UL
PMV BLD AUTO: 8.6 FL
PO2 BLDA: 105 MMHG (ref 80–100)
POC BE: 2 MMOL/L
POC SATURATED O2: 98 % (ref 95–100)
POC TCO2: 26 MMOL/L (ref 23–27)
POTASSIUM SERPL-SCNC: 3.4 MMOL/L
RBC # BLD AUTO: 3.46 M/UL
SAMPLE: ABNORMAL
SITE: ABNORMAL
SODIUM SERPL-SCNC: 139 MMOL/L
SP02: 100
VT: 500
WBC # BLD AUTO: 13.1 K/UL

## 2017-02-03 PROCEDURE — 25000003 PHARM REV CODE 250: Performed by: INTERNAL MEDICINE

## 2017-02-03 PROCEDURE — 63600175 PHARM REV CODE 636 W HCPCS: Performed by: INTERNAL MEDICINE

## 2017-02-03 PROCEDURE — 87040 BLOOD CULTURE FOR BACTERIA: CPT

## 2017-02-03 PROCEDURE — 94770 HC EXHALED C02 TEST: CPT

## 2017-02-03 PROCEDURE — 36415 COLL VENOUS BLD VENIPUNCTURE: CPT

## 2017-02-03 PROCEDURE — 80048 BASIC METABOLIC PNL TOTAL CA: CPT

## 2017-02-03 PROCEDURE — 25000003 PHARM REV CODE 250: Performed by: EMERGENCY MEDICINE

## 2017-02-03 PROCEDURE — 99233 SBSQ HOSP IP/OBS HIGH 50: CPT | Mod: ,,, | Performed by: INTERNAL MEDICINE

## 2017-02-03 PROCEDURE — 99900035 HC TECH TIME PER 15 MIN (STAT)

## 2017-02-03 PROCEDURE — 36600 WITHDRAWAL OF ARTERIAL BLOOD: CPT

## 2017-02-03 PROCEDURE — 94761 N-INVAS EAR/PLS OXIMETRY MLT: CPT

## 2017-02-03 PROCEDURE — C9113 INJ PANTOPRAZOLE SODIUM, VIA: HCPCS | Performed by: INTERNAL MEDICINE

## 2017-02-03 PROCEDURE — 85025 COMPLETE CBC W/AUTO DIFF WBC: CPT

## 2017-02-03 PROCEDURE — 20000000 HC ICU ROOM

## 2017-02-03 PROCEDURE — 82803 BLOOD GASES ANY COMBINATION: CPT

## 2017-02-03 PROCEDURE — 27000221 HC OXYGEN, UP TO 24 HOURS

## 2017-02-03 PROCEDURE — 94003 VENT MGMT INPAT SUBQ DAY: CPT

## 2017-02-03 PROCEDURE — 80202 ASSAY OF VANCOMYCIN: CPT

## 2017-02-03 PROCEDURE — 99291 CRITICAL CARE FIRST HOUR: CPT | Mod: ,,, | Performed by: INTERNAL MEDICINE

## 2017-02-03 RX ORDER — POTASSIUM CHLORIDE 20 MEQ/15ML
40 SOLUTION ORAL ONCE
Status: COMPLETED | OUTPATIENT
Start: 2017-02-03 | End: 2017-02-03

## 2017-02-03 RX ORDER — PANTOPRAZOLE SODIUM 40 MG/10ML
40 INJECTION, POWDER, LYOPHILIZED, FOR SOLUTION INTRAVENOUS DAILY
Status: DISCONTINUED | OUTPATIENT
Start: 2017-02-03 | End: 2017-02-04

## 2017-02-03 RX ADMIN — PANTOPRAZOLE SODIUM 40 MG: 40 INJECTION, POWDER, FOR SOLUTION INTRAVENOUS at 02:02

## 2017-02-03 RX ADMIN — CIPROFLOXACIN 400 MG: 2 INJECTION, SOLUTION INTRAVENOUS at 09:02

## 2017-02-03 RX ADMIN — MEROPENEM AND SODIUM CHLORIDE 1 G: 1 INJECTION, SOLUTION INTRAVENOUS at 06:02

## 2017-02-03 RX ADMIN — VANCOMYCIN HYDROCHLORIDE 1000 MG: 1 INJECTION, POWDER, LYOPHILIZED, FOR SOLUTION INTRAVENOUS at 04:02

## 2017-02-03 RX ADMIN — POTASSIUM CHLORIDE 40 MEQ: 1.5 SOLUTION ORAL at 02:02

## 2017-02-03 RX ADMIN — VANCOMYCIN HYDROCHLORIDE 1000 MG: 1 INJECTION, POWDER, LYOPHILIZED, FOR SOLUTION INTRAVENOUS at 06:02

## 2017-02-03 RX ADMIN — CIPROFLOXACIN 400 MG: 2 INJECTION, SOLUTION INTRAVENOUS at 08:02

## 2017-02-03 RX ADMIN — SODIUM CHLORIDE: 0.9 INJECTION, SOLUTION INTRAVENOUS at 09:02

## 2017-02-03 RX ADMIN — ENOXAPARIN SODIUM 40 MG: 100 INJECTION SUBCUTANEOUS at 12:02

## 2017-02-03 RX ADMIN — MEROPENEM AND SODIUM CHLORIDE 1 G: 1 INJECTION, SOLUTION INTRAVENOUS at 05:02

## 2017-02-03 NOTE — PROGRESS NOTES
"Progress Note  Hospital Medicine  Patient Name:Emilie Proctor Sr.  MRN:  [id number]  Patient Class: IP- Inpatient  Admit Date: 2/2/2017  Length of Stay: 1 days  Expected Discharge Date:   Attending Physician: Socorro Chicas MD  Primary Care Provider:  Marcial Avila MD    SUBJECTIVE:     Principal Problem: HCAP (healthcare-associated pneumonia)  Initial history of present illness: Patient is a 66 y.o. male admitted to Hospitalist Service from [facility] with complaint of "Pneumonia". Patient has PMH significant for anoxic brain injury, hypertension, seizures, hyperlipidemia, chronic respiratory failure, history of alcohol and drug abuse, history of CVA and DM-2. EMS reported patient had worsening chest XRs which showed bilateral pneumonia. Patient has a temperature of 100.8 degrees F. Patient was sent over for further evaluation of possible aspiration. Patient not able to communicate. Looks ill and exhausted.    PMH/PSH/SH/FH/Meds: reviewed.    Symptoms/Review of Systems: Tmax 100.7F. Lethargic.  Diet:  PEG/J-tube    Activity level: Bed bound  Pain:  No acute distress    OBJECTIVE:   Vital Signs (Most Recent):      Temp: 99.2 °F (37.3 °C) (02/03/17 0825)  Pulse: 88 (02/03/17 0825)  Resp: (!) 22 (02/03/17 0825)  BP: 106/62 (02/03/17 0825)  SpO2: 97 % (02/03/17 0825)       Vital Signs Range (Last 24H):  Temp:  [99.1 °F (37.3 °C)-100.7 °F (38.2 °C)]   Pulse:  [70-93]   Resp:  [22-30]   BP: ()/(57-78)   SpO2:  [94 %-98 %]     Weight: 70.3 kg (154 lb 15.7 oz)  Body mass index is 22.89 kg/(m^2).    Intake/Output Summary (Last 24 hours) at 02/03/17 1109  Last data filed at 02/03/17 1000   Gross per 24 hour   Intake             2420 ml   Output             1085 ml   Net             1335 ml     Physical Examination:  General appearance: well developed, appears stated age, chronically ill appearing  Head: normocephalic, atraumatic  Eyes: conjunctivae/corneas clear. PERRL.  Nose: Nares normal. Septum " midline.  Throat: lips, mucosa, and tongue normal; teeth and gums normal, no throat erythema.  Neck: supple, symmetrical, trachea midline, no JVD and thyroid not enlarged, symmetric, no tenderness/mass/nodules. Trach noted.  Lungs: clear to auscultation bilaterally and normal respiratory effort  Chest wall: no tenderness  Heart: regular rate and rhythm, S1, S2 normal, no murmur, click, rub or gallop  Abdomen: soft, non-tender non-distented; bowel sounds normal; no masses, no organomegaly. PEG and J tube site with scant discharge  Extremities: no cyanosis, clubbing or edema.   Pulses: 2+ and symmetric  Skin: Skin color, texture, turgor normal. Stage 2 sacral decubitus (refer to nursing assessment)  Lymph nodes: Cervical, supraclavicular, and axillary nodes normal.  Neurologic: Anoxic brain injury. Non-interactive.    CBC:    Recent Labs  Lab 02/02/17 1647 02/03/17  0332   WBC 18.50* 13.10*   RBC 3.83* 3.46*   HGB 9.8* 9.2*   HCT 31.7* 28.6*    205   MCV 83 83   MCH 25.7* 26.6*   MCHC 31.0* 32.2   BMP    Recent Labs  Lab 02/02/17  1613 02/03/17  0332   * 95    139   K 4.1 3.4*    108   CO2 25 24   BUN 40* 36*   CREATININE 0.8 0.8   CALCIUM 8.7 8.2*      Diagnostic Results:  Microbiology Results (last 7 days)     Procedure Component Value Units Date/Time    Blood culture #2 **CANNOT BE ORDERED STAT** [[id number]] Collected:  02/02/17 1647    Order Status:  Completed Specimen:  Blood Updated:  02/03/17 0515     Blood Culture, Routine No Growth to date    Blood culture #1 **CANNOT BE ORDERED STAT** [[id number]] Collected:  02/02/17 1613    Order Status:  Completed Specimen:  Blood Updated:  02/03/17 0515     Blood Culture, Routine No Growth to date    Urine culture **CANNOT BE ORDERED STAT** [[id number]] Collected:  02/02/17 1628    Order Status:  Sent Specimen:  Urine from Urine, Catheterized Updated:  02/02/17 2054      Chest X-Ray: 1.  Stable radiographic appearance to the chest.  Persistent  bilateral pulmonary opacities are noted for which differential considerations include pneumonia and aspiration in this patient with history of esophageal stent.  Small left pleural effusion noted.  2.  Stable dilatation of the aorta in this patient with known type B thoracic dissection.    CXR: Stable examination demonstrating patchy bilateral airspace disease likely reflecting pneumonia.    Assessment/Plan:   Sepsis with HCAP  HCAP  Supplemental O2 via nasal canula; titrate O2 saturation to >92%.   Pulmonary consultation.   Continue beta 2 agonist bronchodilator treatments.   Continue IV antibiotics - Merrem, Cipro and Vancomycin.  Pharmacy to dose Vanco.  Check sputum GS and Cx.   Continue routine medications as before.      Chronic respiratory failure  Continue mechanical ventilation  Continuous oximetry  Nebulized bronchodilator     Hemiplegia as late effect of stroke  Turn and position    Hypokalemia  Replete KCl. Follow BMP.      Tracheostomy in place  Continue routine tracheostomy care      Functional quadriplegia  Turn and position      Gastrostomy status  Keep g tube open to gravity      Seizure disorder  Continue Levetiracetam, check level.  VTE Risk Mitigation         Ordered     enoxaparin injection 40 mg  Daily     Route:  Subcutaneous        02/02/17 1947     Low Risk of VTE  Once      02/02/17 1947        Socorro Chicas MD  Department of Hospital Medicine   [facility]

## 2017-02-03 NOTE — PROGRESS NOTES
1031  No family at bedside.    1100  Attempted to call pt's sister Ana ([phone]) however no answer/no voicemail.    1439  2nd attempt to call sister Ana; no answer.

## 2017-02-03 NOTE — PLAN OF CARE
Problem: Patient Care Overview  Goal: Plan of Care Review  Outcome: Ongoing (interventions implemented as appropriate)  Pt on vent as ordered with abg dailys

## 2017-02-03 NOTE — PLAN OF CARE
Problem: Patient Care Overview  Goal: Plan of Care Review  Outcome: Ongoing (interventions implemented as appropriate)  Pt. Is on a vent, settings as charted. Pt. Has an ambu bag at bedside and alarms have been checked.

## 2017-02-03 NOTE — PLAN OF CARE
Problem: Nutrition, Enteral (Adult)  Goal: Signs and Symptoms of Listed Potential Problems Will be Absent, Minimized or Managed (Nutrition, Enteral)  Signs and symptoms of listed potential problems will be absent, minimized or managed by discharge/transition of care (reference Nutrition, Enteral (Adult) CPG).  Recommendations  Recommendation/Intervention: Recommend Diabetasource AC @ 20 mls/hr increasing by 10 mls/hr Q4 hrs or as patient tolerates to goal rate 65 mls/hr continuous providing 1872 kcals/day, 94 g protein/day, 156 g CHO/day, and 1276 mls water/day. Needs assessed per [employer]: 1870 kcals/day. Hold TF x4 hrs for residuals >250mls. Flush 90 mls Q4 hrs to meet fluid needs or per MD.   Goals: 1.) patient will recieve nutrition within 48 hrs.   Nutrition Goal Status: 1.) new  Communication of RD Recs: other (comment) (second sign to MD)

## 2017-02-03 NOTE — CONSULTS
Emilie Proctor Sr. [id number] is a 66 y.o. male who has been consulted for vancomycin dosing.    The patient has the following labs:     Date Creatinine (mg/dl)    BUN WBC Count   2/2/2017 Estimated Creatinine Clearance: 91.5 mL/min (based on Cr of 0.8). Lab Results   Component Value Date    BUN 40 (H) 02/02/2017     Lab Results   Component Value Date    WBC 18.50 (H) 02/02/2017        Current weight is 70.3 kg (154 lb 15.7 oz)    The patient will be started on vancomycin at a dose of 1000 mg every 8 hours (14 mg/kg/dose).  The vancomycin trough has been ordered for 02/03 at 2130.  Target trough goal is 15 to 20 mg/dL for pneumonia.    Patient will be followed by pharmacy for changes in renal function, toxicity, and efficacy.   Thank you for allowing us to participate in this patient's care.     Wilfred Velarde, ZamzamD

## 2017-02-03 NOTE — PLAN OF CARE
Problem: Pressure Ulcer Risk (Darrell Scale) (Adult,Obstetrics,Pediatric)  Goal: Skin Integrity  Patient will demonstrate the desired outcomes by discharge/transition of care.   Outcome: Ongoing (interventions implemented as appropriate)  Safety maintained, Left thigh wound healing well, photo taken, sacral healed as well.  Trach to vent intact with settings as ordered.PEG to gravity, J-Tube clamped and flushed to maintain patency.  Vital signs stable

## 2017-02-03 NOTE — PROGRESS NOTES
[facility]  Adult Nutrition  Progress Note    SUMMARY     Recommendations  Recommendation/Intervention: Recommend Diabetasource AC @ 20 mls/hr increasing by 10 mls/hr Q4 hrs or as patient tolerates to goal rate 65 mls/hr continuous providing 1872 kcals/day, 94 g protein/day, 156 g CHO/day, and 1276 mls water/day. Needs assessed per [place]: 1870 kcals/day. Hold TF x4 hrs for residuals >250mls. Flush 90 mls Q4 hrs to meet fluid needs or per MD.   Goals: 1.) patient will recieve nutrition within 48 hrs.   Nutrition Goal Status: 1.) new  Communication of RD Recs: other (comment) (second sign to MD)    1. Sepsis, due to unspecified organism    2. Pneumonia      Past Medical History   Diagnosis Date    Anoxic brain injury 7/25/2015     during cardiac arrest    Chronic kidney disease     Decubitus ulcer of ankle, unstageable      outer right ankle    Decubitus ulcer of sacral region, stage 3     Decubitus ulcer, unstageable      left ear upper with blister to lower ear lobe    Diabetes mellitus      type II    Dissecting aneurysm of thoracoabdominal aorta 7/25/2015    Encounter for blood transfusion     H/O renal calculi     History of ATN      r/t hypoperfusion    Hyperlipidemia     Hypertension     Seizures     Severe anemia     Stroke 7/25/2015     bilateral watershed infarcts    Ventilator dependent          Reason for Assessment  Reason for Assessment: nurse/nurse practitioner consult  Diagnosis:  (admits with pnuemonia from [place])  Interdisciplinary Rounds: attended  Level of Care: Nutrition Support   General Comments: Patient intubated on ICU. No propofol. Consult for peg feedings.   Patient maintaining weight since last admission (november 2016) of 70 kg.     Nutrition Prescription Ordered  Current Diet Order: NPO      Evaluation of Received Nutrients/Fluid Intake  IV Fluid (mL): 2400     Nutrition Risk Screen  Nutrition Risk Screen: tube feeding or parenteral  nutrition    Nutrition/Diet History  Factors Affecting Nutritional Intake: NPO, on mechanical ventilation    Labs/Tests/Procedures/Meds  Diagnostic Test/Procedure Review: reviewed  Pertinent Labs Reviewed: reviewed, pertinent  BMP  Lab Results   Component Value Date     02/03/2017    K 3.4 (L) 02/03/2017     02/03/2017    CO2 24 02/03/2017    BUN 36 (H) 02/03/2017    CREATININE 0.8 02/03/2017    CALCIUM 8.2 (L) 02/03/2017    ANIONGAP 7 (L) 02/03/2017    ESTGFRAFRICA >60 02/03/2017    EGFRNONAA >60 02/03/2017     Lab Results   Component Value Date    ALBUMIN 2.5 (L) 02/02/2017     Lab Results   Component Value Date    CALCIUM 8.2 (L) 02/03/2017    PHOS 3.7 09/14/2016     No results for input(s): POCTGLUCOSE in the last 24 hours.    Pertinent Medications Reviewed: reviewed  Scheduled Meds:   ciprofloxacin (CIPRO)400mg/200ml D5W IVPB  400 mg Intravenous Q12H    enoxaparin  40 mg Subcutaneous Daily    meropenem (MERREM) IVPB  1 g Intravenous Q12H    vancomycin (VANCOCIN) IVPB  1,000 mg Intravenous Q8H     Continuous Infusions:   sodium chloride 0.9% 100 mL/hr at 02/03/17 0931     PRN Meds:.      Physical Findings  Overall Physical Appearance: on ventilator support  Tubes: gastrostomy tube, jejunostomy tube  Skin: pressure ulcer(s) (ilya score 12)    Anthropometrics  Height (inches): 69.02 in  Weight Method: Bed Scale  Weight (kg): 70.3 kg  Ideal Body Weight (IBW), Male: 160.12 lb  % Ideal Body Weight, Male (lb): 96.79 lb  BMI (kg/m2): 22.88  BMI Grade: 18.5-24.9 - normal      Estimated/Assessed Needs  Weight Used For Calorie Calculations: 70.3 kg (154 lb 15.7 oz)   Height (cm): 175.3 cm  Energy Need Method: [place] (1870)  RMR ([place]. Jeor Equation): 1483.12  Weight Used For Protein Calculations: 70.3 kg (154 lb 15.7 oz)  1.2 gm Protein (gm): 84.54 and 2.0 gm Protein (gm): 140.89  Fluid Need Method: RDA Method (or per MD)   Grams of CHO per day: 233 g max.       Monitor and Evaluation  Food  and Nutrient Intake: energy intake, enteral nutrition intake  Food and Nutrient Adminstration: diet order, enteral and parenteral nutrition administration  Anthropometric Measurements: weight, weight change, body mass index  Biochemical Data, Medical Tests and Procedures: electrolyte and renal panel, glucose/endocrine profile, lipid profile  Nutrition-Focused Physical Findings: overall appearance    Nutrition Risk  Level of Risk:  (x2 weekly)    Nutrition Follow-Up  RD Follow-up?: Yes    Assessment and Plan  Protein-calorie malnutrition, moderate  Nutrition Problem:   Inadequate energy intake    Etiology/Related to:   Decreased ability to consume sufficient energy    Signs/Symptoms: 1.) NPO status, 2.)  increased nutrient needs as evidence by ICU admission.     Treatment Strategy:  Recommend Diabetasource AC @ 20 mls/hr increasing by 10 mls/hr Q4 hrs or as patient tolerates to goal rate 65 mls/hr continuous providing 1872 kcals/day, 94 g protein/day, 156 g CHO/day, and 1276 mls water/day. Needs assessed per [place]: 1870 kcals/day. Hold TF x4 hrs for residuals >250mls. Flush 90 mls Q4 hrs to meet fluid needs or per MD.     Nutrition Diagnosis Status:   New

## 2017-02-03 NOTE — ASSESSMENT & PLAN NOTE
Nutrition Problem:   Inadequate energy intake    Etiology/Related to:   Decreased ability to consume sufficient energy    Signs/Symptoms: NPO status    Treatment Strategy:  Recommend Diabetasource AC @ 20 mls/hr increasing by 10 mls/hr Q4 hrs or as patient tolerates to goal rate 65 mls/hr continuous providing 1872 kcals/day, 94 g protein/day, 156 g CHO/day, and 1276 mls water/day. Needs assessed per [place]: 1870 kcals/day. Hold TF x4 hrs for residuals >250mls. Flush 90 mls Q4 hrs to meet fluid needs or per MD.     Nutrition Diagnosis Status:   New

## 2017-02-03 NOTE — CONSULTS
Admit Date: 2/2/2017  [facility] .   Patient Consult    Chief Complaint   Patient presents with    Pneumonia     possible aspiration pneumonia       History of Present Illness:  Pt with h/o anoxic brain injury and chr trach.  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  Ct chest in aug of 2016  Showed stent in place and did not demonstrate resolution of fistula.  Pt was eval by gi in November and stent was felt to be appropriate.  Trach was recommended not to be changes for fear of causing fistula complications.    Pt has had bilateral infiltrates and mdr organisms in lung secretions.  Infiltrates have not cleared in past.  Pt had temp 100.8 and wbc elevation ppt admission to [facility].     PFSH:  Past Medical History   Diagnosis Date    Anoxic brain injury 7/25/2015     during cardiac arrest    Chronic kidney disease     Decubitus ulcer of ankle, unstageable      outer right ankle    Decubitus ulcer of sacral region, stage 3     Decubitus ulcer, unstageable      left ear upper with blister to lower ear lobe    Diabetes mellitus      type II    Dissecting aneurysm of thoracoabdominal aorta 7/25/2015    Encounter for blood transfusion     H/O renal calculi     History of ATN      r/t hypoperfusion    Hyperlipidemia     Hypertension     Seizures     Severe anemia     Stroke 7/25/2015     bilateral watershed infarcts    Ventilator dependent      Past Surgical History   Procedure Laterality Date    Tracheostomy tube placement      Gastrostomy tube placement      Colonoscopy N/A 5/16/2016     Procedure: COLONOSCOPY;  Surgeon: Wei Moncada MD;  Location: [facility];  Service: Endoscopy;  Laterality: N/A;    Jejunostomy       Social History   Substance Use Topics    Smoking status: Never Smoker    Smokeless tobacco: None    Alcohol use No     History reviewed. No pertinent family history.  Review of patient's  "allergies indicates:  No Known Allergies    Performance Status:Performance Status:The patient's activity level is bedbound.    Review of Systems:  due to neurologic status/impairments a Review of Systems could not be obtained       Exam:Comprehensive exam done.   Visit Vitals    /62    Pulse 87    Temp 98.7 °F (37.1 °C) (Axillary)    Resp (!) 24    Ht 5' 9" (1.753 m)    Wt 70.3 kg (154 lb 15.7 oz)    SpO2 97%    BMI 22.89 kg/m2     Exam included Vitals as listed, and patient's appearance and affect and alertness and mood, oral exam for yeast and hygiene and pharynx lesions and Mallapatti (M) score, neck with inspection for jvd and masses and thyroid abnormalities and lymph nodes (supraclavicular and infraclavicular nodes also examined and noted if abn), chest exam included symmetry and effort and fremitus and percussion and auscultation, cardiac exam included rhythm and gallops and murmur and rubs and jvd and edema, abdominal exam for mass and hepatosplenomegaly and tenderness and hernias and bowel sounds, Musculoskeletal exam with muscle tone and posture and mobility/gait and  strenght, and skin for rashes and cyanosis and pallor and turgor, extremity for clubbing.  Findings were normal except as listed below:  resits exam, trach looks clean, ties in place, looks to have very poor oral hygiene with plaques over plates or teeth.  Shallow resp with no rattle or distress, good resonance. No edema, spastic hand/arms with contracted lower extremities.  No clubbing, cor nl, abd with j tube. Skin intact.    Radiographs reviewed: view by direct vision  Chr infiltrates with no change for worse and perhaps a bit better.    Labs reviewed   \Results for REUBENKENNA Benjamin (MRN [id number]) as of 2/3/2017 17:39   Ref. Range 12/31/2016 15:11 1/2/2017 09:20 2/2/2017 16:47 2/3/2017 03:32   WBC Latest Ref Range: 3.90 - 12.70 K/uL 7.60 21.50 (H) 18.50 (H) 13.10 (H)   RBC Latest Ref Range: 4.60 - 6.20 M/uL 4.11 " (L) 4.09 (L) 3.83 (L) 3.46 (L)   Hemoglobin Latest Ref Range: 14.0 - 18.0 g/dL 10.8 (L) 10.8 (L) 9.8 (L) 9.2 (L)   Hematocrit Latest Ref Range: 40.0 - 54.0 % 34.1 (L) 33.8 (L) 31.7 (L) 28.6 (L)   MCV Latest Ref Range: 82 - 98 fL 83 83 83 83   MCH Latest Ref Range: 27.0 - 31.0 pg 26.2 (L) 26.3 (L) 25.7 (L) 26.6 (L)   MCHC Latest Ref Range: 32.0 - 36.0 % 31.6 (L) 31.8 (L) 31.0 (L) 32.2   RDW Latest Ref Range: 11.5 - 14.5 % 17.8 (H) 17.9 (H) 18.7 (H) 18.7 (H)   Platelets Latest Ref Range: 150 - 350 K/uL 228 254 236 205   Results for KENNA ALEXIS Benjamin (MRN [id number]) as of 2/3/2017 17:39   Ref. Range 2/2/2017 16:13   Sodium Latest Ref Range: 136 - 145 mmol/L 140   Potassium Latest Ref Range: 3.5 - 5.1 mmol/L 4.1   Chloride Latest Ref Range: 95 - 110 mmol/L 106   CO2 Latest Ref Range: 23 - 29 mmol/L 25   Anion Gap Latest Ref Range: 8 - 16 mmol/L 9   BUN, Bld Latest Ref Range: 8 - 23 mg/dL 40 (H)   Creatinine Latest Ref Range: 0.5 - 1.4 mg/dL 0.8   eGFR if non African American Latest Ref Range: >60 mL/min/1.73 m^2 >60   eGFR if  Latest Ref Range: >60 mL/min/1.73 m^2 >60   Glucose Latest Ref Range: 70 - 110 mg/dL 132 (H)   Calcium Latest Ref Range: 8.7 - 10.5 mg/dL 8.7      Klebsiella pneumoniae esbl Acinetobacter baumannii/haemolyticus Pseudomonas aeruginosa        CULTURE, RESPIRATORY CULTURE, RESPIRATORY CULTURE, RESPIRATORY     Amikacin <=16  Sensitive <=16  Sensitive <=16  Sensitive     Amox/K Clav'ate 16/8  Intermediate       Amp/Sulbactam >16/8  Resistant <=8/4  Sensitive      Ampicillin >16  Resistant       Cefazolin >16  Resistant       Cefepime >16  Resistant <=8  Sensitive <=8  Sensitive     Ceftriaxone >32  Resistant 32  Intermediate      Ciprofloxacin >2  Resistant >2  Resistant >2  Resistant     Colistin 1.0  Sensitive 1 3.0  Intermediate 1      Ertapenem <=2  Sensitive       Gentamicin <=4  Sensitive <=4  Sensitive <=4  Sensitive     Meropenem <=4  Sensitive >8  Resistant >8   Resistant     Piperacillin/Tazo >64  Resistant N/R   9  --  Resistant <=16  Sensitive     Tetracycline <=4  Sensitive 8  Intermediate      Tobramycin <=4  Sensitive <=4  Sensitive <=4  Sensitive     Trimeth/Sulfa <=2/38  Sensitive <=2/38  Sensitive        Impression:     Patient Active Problem List   Diagnosis    Aortic dissection distal to left subclavian    Hemiplegia as late effect of stroke    Tracheostomy in place    Functional quadriplegia    Jejunostomy tube in situ    Tracheo-esophageal fistula    Anemia, chronic disease    HCAP (healthcare-associated pneumonia)    Gastrostomy status    Protein-calorie malnutrition, moderate    Seizure disorder    History of CVA (cerebrovascular accident)    Anoxic brain injury    Aspiration into airway    Abnormal CXR    Malfunctioning jejunostomy tube    Acute cystitis with hematuria    Chronic respiratory failure    Transaminitis    Sepsis     Specialty Problems        Pulmonary Problems    Tracheostomy in place        Tracheo-esophageal fistula        * (Principal)HCAP (healthcare-associated pneumonia)        Aspiration into airway        Chronic respiratory failure                    Plan:     Pt is responding wrt wbc and fever to current abx rx.  Not clear pnumonia role?      Would advocate a 7-10 day course and stop.  See no reason to adjust abx per culture.  He had failed more aggressive abx last yr.     See no good management options for fistula or trach.  Address trach only if needed- no routine changes.      Need to keep oral secretions minimal with drying agents as needed.      The following were evaluated and adjusted as needed: mechanical ventilator settings and weaning status, vasopressors, intravenous fluids and nutritional status, sedation and neurologic status, antibiotics, support tubes and access lines and invasive monitoring, acid base balance and oxygenation needs and input and output and renal status       Critical Care  - THE  PATIENT HAS A HIGH PROBABILITY OF IMMINENT OR LIFE THREATENING DETERIORATION.  Over 50%time of encounter was in direct care at bedside.  Time was 30 to 74 minutes required for patient care.  Time needed for all of the above totaled 42 minutes.

## 2017-02-04 PROBLEM — J18.9 PNEUMONIA: Status: ACTIVE | Noted: 2017-02-04

## 2017-02-04 LAB
ANION GAP SERPL CALC-SCNC: 6 MMOL/L
BACTERIA UR CULT: NO GROWTH
BASOPHILS # BLD AUTO: 0 K/UL
BASOPHILS NFR BLD: 0.2 %
BUN SERPL-MCNC: 25 MG/DL
CALCIUM SERPL-MCNC: 8.2 MG/DL
CHLORIDE SERPL-SCNC: 108 MMOL/L
CO2 SERPL-SCNC: 23 MMOL/L
CREAT SERPL-MCNC: 0.7 MG/DL
DIFFERENTIAL METHOD: ABNORMAL
EOSINOPHIL # BLD AUTO: 0.2 K/UL
EOSINOPHIL NFR BLD: 2 %
ERYTHROCYTE [DISTWIDTH] IN BLOOD BY AUTOMATED COUNT: 18.3 %
EST. GFR  (AFRICAN AMERICAN): >60 ML/MIN/1.73 M^2
EST. GFR  (NON AFRICAN AMERICAN): >60 ML/MIN/1.73 M^2
GLUCOSE SERPL-MCNC: 122 MG/DL
HCT VFR BLD AUTO: 26.9 %
HGB BLD-MCNC: 8.5 G/DL
LYMPHOCYTES # BLD AUTO: 1.9 K/UL
LYMPHOCYTES NFR BLD: 18.4 %
MAGNESIUM SERPL-MCNC: 1.7 MG/DL
MCH RBC QN AUTO: 26.3 PG
MCHC RBC AUTO-ENTMCNC: 31.5 %
MCV RBC AUTO: 84 FL
MONOCYTES # BLD AUTO: 0.7 K/UL
MONOCYTES NFR BLD: 6.5 %
NEUTROPHILS # BLD AUTO: 7.4 K/UL
NEUTROPHILS NFR BLD: 72.9 %
PLATELET # BLD AUTO: 214 K/UL
PMV BLD AUTO: 8.4 FL
POTASSIUM SERPL-SCNC: 3.4 MMOL/L
RBC # BLD AUTO: 3.22 M/UL
SODIUM SERPL-SCNC: 137 MMOL/L
VANCOMYCIN TROUGH SERPL-MCNC: 23.7 UG/ML
WBC # BLD AUTO: 10.1 K/UL

## 2017-02-04 PROCEDURE — C9113 INJ PANTOPRAZOLE SODIUM, VIA: HCPCS | Performed by: INTERNAL MEDICINE

## 2017-02-04 PROCEDURE — 80048 BASIC METABOLIC PNL TOTAL CA: CPT

## 2017-02-04 PROCEDURE — 25000003 PHARM REV CODE 250: Performed by: NURSE PRACTITIONER

## 2017-02-04 PROCEDURE — 27000221 HC OXYGEN, UP TO 24 HOURS

## 2017-02-04 PROCEDURE — 85025 COMPLETE CBC W/AUTO DIFF WBC: CPT

## 2017-02-04 PROCEDURE — 63600175 PHARM REV CODE 636 W HCPCS: Performed by: INTERNAL MEDICINE

## 2017-02-04 PROCEDURE — 99233 SBSQ HOSP IP/OBS HIGH 50: CPT | Mod: ,,, | Performed by: INTERNAL MEDICINE

## 2017-02-04 PROCEDURE — 36415 COLL VENOUS BLD VENIPUNCTURE: CPT

## 2017-02-04 PROCEDURE — 25000003 PHARM REV CODE 250: Performed by: HOSPITALIST

## 2017-02-04 PROCEDURE — 94003 VENT MGMT INPAT SUBQ DAY: CPT

## 2017-02-04 PROCEDURE — 25000003 PHARM REV CODE 250: Performed by: INTERNAL MEDICINE

## 2017-02-04 PROCEDURE — 94770 HC EXHALED C02 TEST: CPT

## 2017-02-04 PROCEDURE — 83735 ASSAY OF MAGNESIUM: CPT

## 2017-02-04 PROCEDURE — 20000000 HC ICU ROOM

## 2017-02-04 PROCEDURE — 25000003 PHARM REV CODE 250: Performed by: EMERGENCY MEDICINE

## 2017-02-04 PROCEDURE — 94761 N-INVAS EAR/PLS OXIMETRY MLT: CPT

## 2017-02-04 RX ORDER — POLYETHYLENE GLYCOL 3350 17 G/17G
17 POWDER, FOR SOLUTION ORAL DAILY
Status: DISCONTINUED | OUTPATIENT
Start: 2017-02-04 | End: 2017-02-14 | Stop reason: HOSPADM

## 2017-02-04 RX ORDER — ACETYLCYSTEINE 200 MG/ML
4 SOLUTION ORAL; RESPIRATORY (INHALATION) EVERY 6 HOURS
Status: DISCONTINUED | OUTPATIENT
Start: 2017-02-04 | End: 2017-02-04

## 2017-02-04 RX ORDER — LEVETIRACETAM 100 MG/ML
500 SOLUTION ORAL 2 TIMES DAILY
Status: DISCONTINUED | OUTPATIENT
Start: 2017-02-04 | End: 2017-02-14 | Stop reason: HOSPADM

## 2017-02-04 RX ORDER — MAGNESIUM SULFATE HEPTAHYDRATE 40 MG/ML
2 INJECTION, SOLUTION INTRAVENOUS ONCE
Status: DISCONTINUED | OUTPATIENT
Start: 2017-02-04 | End: 2017-02-04

## 2017-02-04 RX ORDER — GLYCOPYRROLATE 1 MG/1
1 TABLET ORAL 3 TIMES DAILY
Status: DISCONTINUED | OUTPATIENT
Start: 2017-02-04 | End: 2017-02-14 | Stop reason: HOSPADM

## 2017-02-04 RX ORDER — PANTOPRAZOLE SODIUM 40 MG/10ML
40 INJECTION, POWDER, LYOPHILIZED, FOR SOLUTION INTRAVENOUS DAILY
Status: DISCONTINUED | OUTPATIENT
Start: 2017-02-05 | End: 2017-02-14 | Stop reason: HOSPADM

## 2017-02-04 RX ORDER — CHLORHEXIDINE GLUCONATE ORAL RINSE 1.2 MG/ML
15 SOLUTION DENTAL 2 TIMES DAILY
Status: DISCONTINUED | OUTPATIENT
Start: 2017-02-04 | End: 2017-02-14 | Stop reason: HOSPADM

## 2017-02-04 RX ORDER — POTASSIUM CHLORIDE 20 MEQ/15ML
40 SOLUTION ORAL ONCE
Status: COMPLETED | OUTPATIENT
Start: 2017-02-04 | End: 2017-02-04

## 2017-02-04 RX ORDER — CARVEDILOL 6.25 MG/1
12.5 TABLET ORAL 2 TIMES DAILY
Status: DISCONTINUED | OUTPATIENT
Start: 2017-02-04 | End: 2017-02-14 | Stop reason: HOSPADM

## 2017-02-04 RX ORDER — ROSUVASTATIN CALCIUM 5 MG/1
5 TABLET, COATED ORAL DAILY
Status: DISCONTINUED | OUTPATIENT
Start: 2017-02-04 | End: 2017-02-14 | Stop reason: HOSPADM

## 2017-02-04 RX ORDER — FERROUS SULFATE 300 MG/5ML
300 LIQUID (ML) ORAL 2 TIMES DAILY
Status: DISCONTINUED | OUTPATIENT
Start: 2017-02-04 | End: 2017-02-14 | Stop reason: HOSPADM

## 2017-02-04 RX ORDER — LEVETIRACETAM 500 MG/1
500 TABLET ORAL 2 TIMES DAILY
Status: DISCONTINUED | OUTPATIENT
Start: 2017-02-04 | End: 2017-02-04

## 2017-02-04 RX ORDER — MAGNESIUM SULFATE/D5W 2 G/50 ML
2 INTRAVENOUS SOLUTION, PIGGYBACK (ML) INTRAVENOUS ONCE
Status: COMPLETED | OUTPATIENT
Start: 2017-02-04 | End: 2017-02-04

## 2017-02-04 RX ORDER — AMLODIPINE BESYLATE 5 MG/1
10 TABLET ORAL DAILY
Status: DISCONTINUED | OUTPATIENT
Start: 2017-02-04 | End: 2017-02-04

## 2017-02-04 RX ADMIN — MEROPENEM AND SODIUM CHLORIDE 1 G: 1 INJECTION, SOLUTION INTRAVENOUS at 05:02

## 2017-02-04 RX ADMIN — MINERAL SUPPLEMENT IRON 300 MG / 5 ML STRENGTH LIQUID 100 PER BOX UNFLAVORED 300 MG: at 09:02

## 2017-02-04 RX ADMIN — CHLORHEXIDINE GLUCONATE 15 ML: 1.2 RINSE ORAL at 12:02

## 2017-02-04 RX ADMIN — PANTOPRAZOLE SODIUM 40 MG: 40 INJECTION, POWDER, FOR SOLUTION INTRAVENOUS at 09:02

## 2017-02-04 RX ADMIN — GLYCOPYRROLATE 1 MG: 1 TABLET ORAL at 09:02

## 2017-02-04 RX ADMIN — CIPROFLOXACIN 400 MG: 2 INJECTION, SOLUTION INTRAVENOUS at 09:02

## 2017-02-04 RX ADMIN — CARVEDILOL 12.5 MG: 6.25 TABLET, FILM COATED ORAL at 12:02

## 2017-02-04 RX ADMIN — ROSUVASTATIN CALCIUM 5 MG: 5 TABLET, FILM COATED ORAL at 12:02

## 2017-02-04 RX ADMIN — VANCOMYCIN HYDROCHLORIDE 1000 MG: 1 INJECTION, POWDER, LYOPHILIZED, FOR SOLUTION INTRAVENOUS at 02:02

## 2017-02-04 RX ADMIN — Medication 2 G: at 06:02

## 2017-02-04 RX ADMIN — SODIUM CHLORIDE: 0.9 INJECTION, SOLUTION INTRAVENOUS at 12:02

## 2017-02-04 RX ADMIN — POLYETHYLENE GLYCOL (3350) 17 G: 17 POWDER, FOR SOLUTION ORAL at 12:02

## 2017-02-04 RX ADMIN — GLYCOPYRROLATE 1 MG: 1 TABLET ORAL at 02:02

## 2017-02-04 RX ADMIN — LACTOBACILLUS TAB 1 TABLET: TAB at 05:02

## 2017-02-04 RX ADMIN — VANCOMYCIN HYDROCHLORIDE 750 MG: 750 INJECTION, POWDER, LYOPHILIZED, FOR SOLUTION INTRAVENOUS at 01:02

## 2017-02-04 RX ADMIN — LACTOBACILLUS TAB 1 TABLET: TAB at 12:02

## 2017-02-04 RX ADMIN — ENOXAPARIN SODIUM 40 MG: 100 INJECTION SUBCUTANEOUS at 12:02

## 2017-02-04 RX ADMIN — CARVEDILOL 12.5 MG: 6.25 TABLET, FILM COATED ORAL at 09:02

## 2017-02-04 RX ADMIN — LEVETIRACETAM 500 MG: 500 SOLUTION ORAL at 12:02

## 2017-02-04 RX ADMIN — LEVETIRACETAM 500 MG: 500 SOLUTION ORAL at 09:02

## 2017-02-04 RX ADMIN — MINERAL SUPPLEMENT IRON 300 MG / 5 ML STRENGTH LIQUID 100 PER BOX UNFLAVORED 300 MG: at 12:02

## 2017-02-04 RX ADMIN — CHLORHEXIDINE GLUCONATE 15 ML: 1.2 RINSE ORAL at 09:02

## 2017-02-04 RX ADMIN — POTASSIUM CHLORIDE 40 MEQ: 1.5 SOLUTION ORAL at 06:02

## 2017-02-04 NOTE — CONSULTS
Emilie Proctor Sr. [id number] is a 66 y.o. male who has been consulted for vancomycin dosing.    The patient has the following labs:     Date Creatinine (mg/dl)    BUN WBC Count   2/4/2017 Estimated Creatinine Clearance: 104.6 mL/min (based on Cr of 0.7). Lab Results   Component Value Date    BUN 25 (H) 02/04/2017     Lab Results   Component Value Date    WBC 10.10 02/04/2017        Current weight is 70.3 kg (154 lb 15.7 oz)    Pt is receiving vancomycin 1000 mg every 8 hours.  Vancomycin trough from 2/3 at 2343 was 23.7 mg/dL.  Target goal is 15-20 mg/dL.    Trough was drawn on time and anticipate it is  Supratherapeutic. Pharmacy will decrease current dose.   The patient will be changed to a vancomycin dose of 1000 mg every 12 hoursThe vancomycin trough has been ordered for 2/5 at 1300.  Patient will be followed by pharmacy for changes in renal function, toxicity, and efficacy.    Thank you for allowing us to participate in this patient's care.     Zamzam AguirreD

## 2017-02-04 NOTE — CONSULTS
Emilie Proctor Sr. [id number] is a 66 y.o. male who has been consulted for vancomycin dosing.    The patient has the following labs:     Date Creatinine (mg/dl)    BUN WBC Count   2/4/2017 Estimated Creatinine Clearance: 91.5 mL/min (based on Cr of 0.8). Lab Results   Component Value Date    BUN 36 (H) 02/03/2017     Lab Results   Component Value Date    WBC 13.10 (H) 02/03/2017        Current weight is 70.3 kg (154 lb 15.7 oz)    Pt is receiving vancomycin 1000 mg every 8 hours.  Vancomycin trough from 2/3/17 at 2343 was 23.7 mg/dL.  Target goal is 15-20 mg/dL.    Trough was drawn on time and anticipate it is  Supratherapeutic. Pharmacy will decrease current dose.   The patient will be changed to a vancomycin dose of 750 mg every 8 hoursThe vancomycin trough has been ordered for 2/5/17 at 0100.  Patient will be followed by pharmacy for changes in renal function, toxicity, and efficacy.    Thank you for allowing us to participate in this patient's care.     Jimmie Jacques, Pharmacist

## 2017-02-04 NOTE — PLAN OF CARE
Problem: Patient Care Overview  Goal: Plan of Care Review  Outcome: Ongoing (interventions implemented as appropriate)  Patient rested well through the shift. VSS. On continuous rotation therapy for pulmonary toileting and to prevent skin breakdown. Pt remains free from injury. Afebrile.

## 2017-02-04 NOTE — PLAN OF CARE
Problem: Patient Care Overview  Goal: Plan of Care Review  Outcome: Revised  Tube feeding initiated via J-tube today. Bed bound, LE contracted. Healed decubiti to sacrum. Safety maintained.

## 2017-02-04 NOTE — PLAN OF CARE
Problem: Patient Care Overview  Goal: Plan of Care Review  Outcome: Ongoing (interventions implemented as appropriate)  Pt. Is on a vent, settings as charted. Pt. Has an ambu bag at bedside and alarms have been checked

## 2017-02-04 NOTE — PLAN OF CARE
Problem: Patient Care Overview  Goal: Plan of Care Review  Outcome: Ongoing (interventions implemented as appropriate)  Pt on vent as ordered with [facility] care Qshift

## 2017-02-04 NOTE — PROGRESS NOTES
Progress Note  PULMONARY    Admit Date: 2/2/2017 2/4/2017        SUBJECTIVE:     Feb 4, actually animated and following commands today.  No c/o or distress      PFSH and Allergies reviewed.  Pt's neuro status prevents obtaining ros.    OBJECTIVE:     Vitals (Most recent):  Vitals:    02/04/17 0852   BP:    Pulse: 66   Resp: (!) 22   Temp:        Vitals (24 hour range):  Temp:  [98 °F (36.7 °C)-98.9 °F (37.2 °C)]   Pulse:  [60-97]   Resp:  [12-28]   BP: (109-162)/(59-85)   SpO2:  [85 %-100 %]       Intake/Output Summary (Last 24 hours) at 02/04/17 1004  Last data filed at 02/04/17 0922   Gross per 24 hour   Intake           4268.5 ml   Output             1280 ml   Net           2988.5 ml          Physical Exam:  The patient's neuro status (alertness,orientation,cognitive function,motor skills,), pharyngeal exam (oral lesions, hygiene, abn dentition,), Neck (jvd,mass,thyroid,nodes in neck and above/below clavicle),RESPIRATORY(symmetry,effort,fremitus,percussion,auscultation),  Cor(rhythm,heart tones including gallops,perfusion,edema)ABD(distention,hepatic&splenomegaly,tenderness,masses), Skin(rash,cyanosis),Psyc(affect,judgement,).  Exam negative except for these pertinent findings:    Trach, secretions controlled, no ecxess trach secretions, no edema, good bs.    Diagnostic Results:  All labs last 4 entries of CBC,CMP/BMP,AGB, MICRO reviewed.  All rediographs of chest, lungs, & abd reviewed by direct vision last 3 days.     Results for REUBEN JUANITOSRIRAM MCGUIRE Benjamin (MRN [id number]) as of 2/4/2017 10:05   Ref. Range 2/4/2017 03:53   WBC Latest Ref Range: 3.90 - 12.70 K/uL 10.10   RBC Latest Ref Range: 4.60 - 6.20 M/uL 3.22 (L)   Hemoglobin Latest Ref Range: 14.0 - 18.0 g/dL 8.5 (L)   Hematocrit Latest Ref Range: 40.0 - 54.0 % 26.9 (L)   MCV Latest Ref Range: 82 - 98 fL 84   MCH Latest Ref Range: 27.0 - 31.0 pg 26.3 (L)   MCHC Latest Ref Range: 32.0 - 36.0 % 31.5 (L)   RDW Latest Ref Range: 11.5 - 14.5 % 18.3 (H)    Platelets Latest Ref Range: 150 - 350 K/uL 214   MPV Latest Ref Range: 9.2 - 12.9 fL 8.4 (L)   Gran% Latest Ref Range: 38.0 - 73.0 % 72.9   Gran # Latest Ref Range: 1.8 - 7.7 K/uL 7.4   Lymph% Latest Ref Range: 18.0 - 48.0 % 18.4   Lymph # Latest Ref Range: 1.0 - 4.8 K/uL 1.9   Mono% Latest Ref Range: 4.0 - 15.0 % 6.5   Mono # Latest Ref Range: 0.3 - 1.0 K/uL 0.7   Eosinophil% Latest Ref Range: 0.0 - 8.0 % 2.0   Eos # Latest Ref Range: 0.0 - 0.5 K/uL 0.2   Basophil% Latest Ref Range: 0.0 - 1.9 % 0.2   Baso # Latest Ref Range: 0.00 - 0.20 K/uL 0.00   Sodium Latest Ref Range: 136 - 145 mmol/L 137   Potassium Latest Ref Range: 3.5 - 5.1 mmol/L 3.4 (L)   Chloride Latest Ref Range: 95 - 110 mmol/L 108   CO2 Latest Ref Range: 23 - 29 mmol/L 23   Anion Gap Latest Ref Range: 8 - 16 mmol/L 6 (L)   BUN, Bld Latest Ref Range: 8 - 23 mg/dL 25 (H)   Creatinine Latest Ref Range: 0.5 - 1.4 mg/dL 0.7   eGFR if non African American Latest Ref Range: >60 mL/min/1.73 m^2 >60   eGFR if  Latest Ref Range: >60 mL/min/1.73 m^2 >60   Glucose Latest Ref Range: 70 - 110 mg/dL 122 (H)   Calcium Latest Ref Range: 8.7 - 10.5 mg/dL 8.2 (L)   Magnesium Latest Ref Range: 1.6 - 2.6 mg/dL 1.7           Results for KENNA ALEXIS SR. (MRN [id number]) as of 2/4/2017 10:05   Ref. Range 2/3/2017 18:48   POC PH Latest Ref Range: 7.35 - 7.45  7.471 (H)   POC PCO2 Latest Ref Range: 35 - 45 mmHg 34.7 (L)   POC PO2 Latest Ref Range: 80 - 100 mmHg 105 (H)   POC BE Latest Ref Range: -2 to 2 mmol/L 2   POC HCO3 Latest Ref Range: 24 - 28 mmol/L 25.3       ASSESSMENT/PLAN:     Patient Active Problem List   Diagnosis    Aortic dissection distal to left subclavian    Hemiplegia as late effect of stroke    Tracheostomy in place    Functional quadriplegia    Jejunostomy tube in situ    Tracheo-esophageal fistula    Anemia, chronic disease    HCAP (healthcare-associated pneumonia)    Acute on chronic respiratory failure with  hypercapnia    Gastrostomy status    Protein-calorie malnutrition, moderate    Seizure disorder    History of CVA (cerebrovascular accident)    Anoxic brain injury    Aspiration into airway    Abnormal CXR    Malfunctioning jejunostomy tube    Acute cystitis with hematuria    Chronic respiratory failure    Transaminitis    Sepsis    Pneumonia     Specialty Problems        Pulmonary Problems    Tracheostomy in place        Tracheo-esophageal fistula        * (Principal)HCAP (healthcare-associated pneumonia)        Acute on chronic respiratory failure with hypercapnia        Aspiration into airway        Chronic respiratory failure        Pneumonia                  rec    Sputum culture here may not be helpful.  Pt has responded to current abx regimen.  Would recommend  7 day course since good rapid response seen with current abx. With expected active tracheoesphageal fistula- prolonged abx if not needed.certainly will not improve picture but WORSEN.  Could potentially complete abx at [facility]?                                        .

## 2017-02-05 LAB
ALBUMIN SERPL BCP-MCNC: 2.1 G/DL
ALP SERPL-CCNC: 117 U/L
ALT SERPL W/O P-5'-P-CCNC: 27 U/L
ANION GAP SERPL CALC-SCNC: 7 MMOL/L
AST SERPL-CCNC: 26 U/L
BASOPHILS # BLD AUTO: 0 K/UL
BASOPHILS NFR BLD: 0.4 %
BILIRUB SERPL-MCNC: 1 MG/DL
BUN SERPL-MCNC: 16 MG/DL
CALCIUM SERPL-MCNC: 8.4 MG/DL
CHLORIDE SERPL-SCNC: 103 MMOL/L
CO2 SERPL-SCNC: 21 MMOL/L
CREAT SERPL-MCNC: 0.6 MG/DL
DIFFERENTIAL METHOD: ABNORMAL
EOSINOPHIL # BLD AUTO: 0.2 K/UL
EOSINOPHIL NFR BLD: 2.2 %
ERYTHROCYTE [DISTWIDTH] IN BLOOD BY AUTOMATED COUNT: 18.4 %
EST. GFR  (AFRICAN AMERICAN): >60 ML/MIN/1.73 M^2
EST. GFR  (NON AFRICAN AMERICAN): >60 ML/MIN/1.73 M^2
GLUCOSE SERPL-MCNC: 101 MG/DL
HCT VFR BLD AUTO: 29.4 %
HGB BLD-MCNC: 9.2 G/DL
LYMPHOCYTES # BLD AUTO: 2.4 K/UL
LYMPHOCYTES NFR BLD: 33.2 %
MAGNESIUM SERPL-MCNC: 1.6 MG/DL
MCH RBC QN AUTO: 26 PG
MCHC RBC AUTO-ENTMCNC: 31.4 %
MCV RBC AUTO: 83 FL
MONOCYTES # BLD AUTO: 0.5 K/UL
MONOCYTES NFR BLD: 6.8 %
NEUTROPHILS # BLD AUTO: 4.2 K/UL
NEUTROPHILS NFR BLD: 57.4 %
PHOSPHATE SERPL-MCNC: 2.6 MG/DL
PLATELET # BLD AUTO: 225 K/UL
PMV BLD AUTO: 8.9 FL
POTASSIUM SERPL-SCNC: 4.1 MMOL/L
PROT SERPL-MCNC: 7.6 G/DL
RBC # BLD AUTO: 3.55 M/UL
SODIUM SERPL-SCNC: 131 MMOL/L
VANCOMYCIN TROUGH SERPL-MCNC: 20.1 UG/ML
WBC # BLD AUTO: 7.3 K/UL

## 2017-02-05 PROCEDURE — 83735 ASSAY OF MAGNESIUM: CPT

## 2017-02-05 PROCEDURE — 80053 COMPREHEN METABOLIC PANEL: CPT

## 2017-02-05 PROCEDURE — 20000000 HC ICU ROOM

## 2017-02-05 PROCEDURE — 25000003 PHARM REV CODE 250: Performed by: INTERNAL MEDICINE

## 2017-02-05 PROCEDURE — 80202 ASSAY OF VANCOMYCIN: CPT

## 2017-02-05 PROCEDURE — 27000221 HC OXYGEN, UP TO 24 HOURS

## 2017-02-05 PROCEDURE — 99233 SBSQ HOSP IP/OBS HIGH 50: CPT | Mod: ,,, | Performed by: INTERNAL MEDICINE

## 2017-02-05 PROCEDURE — 85025 COMPLETE CBC W/AUTO DIFF WBC: CPT

## 2017-02-05 PROCEDURE — 84100 ASSAY OF PHOSPHORUS: CPT

## 2017-02-05 PROCEDURE — 27200966 HC CLOSED SUCTION SYSTEM

## 2017-02-05 PROCEDURE — 63600175 PHARM REV CODE 636 W HCPCS: Performed by: NURSE PRACTITIONER

## 2017-02-05 PROCEDURE — 25000003 PHARM REV CODE 250: Performed by: HOSPITALIST

## 2017-02-05 PROCEDURE — 94761 N-INVAS EAR/PLS OXIMETRY MLT: CPT

## 2017-02-05 PROCEDURE — 63600175 PHARM REV CODE 636 W HCPCS: Performed by: INTERNAL MEDICINE

## 2017-02-05 PROCEDURE — 94003 VENT MGMT INPAT SUBQ DAY: CPT

## 2017-02-05 PROCEDURE — 36415 COLL VENOUS BLD VENIPUNCTURE: CPT

## 2017-02-05 PROCEDURE — C9113 INJ PANTOPRAZOLE SODIUM, VIA: HCPCS | Performed by: NURSE PRACTITIONER

## 2017-02-05 PROCEDURE — 94770 HC EXHALED C02 TEST: CPT

## 2017-02-05 RX ORDER — MAGNESIUM SULFATE/D5W 2 G/50 ML
2 INTRAVENOUS SOLUTION, PIGGYBACK (ML) INTRAVENOUS ONCE
Status: COMPLETED | OUTPATIENT
Start: 2017-02-05 | End: 2017-02-05

## 2017-02-05 RX ADMIN — ROSUVASTATIN CALCIUM 5 MG: 5 TABLET, FILM COATED ORAL at 09:02

## 2017-02-05 RX ADMIN — CARVEDILOL 12.5 MG: 6.25 TABLET, FILM COATED ORAL at 09:02

## 2017-02-05 RX ADMIN — GLYCOPYRROLATE 1 MG: 1 TABLET ORAL at 05:02

## 2017-02-05 RX ADMIN — ENOXAPARIN SODIUM 40 MG: 100 INJECTION SUBCUTANEOUS at 12:02

## 2017-02-05 RX ADMIN — VANCOMYCIN HYDROCHLORIDE 1000 MG: 1 INJECTION, POWDER, LYOPHILIZED, FOR SOLUTION INTRAVENOUS at 02:02

## 2017-02-05 RX ADMIN — Medication 2 G: at 09:02

## 2017-02-05 RX ADMIN — CHLORHEXIDINE GLUCONATE 15 ML: 1.2 RINSE ORAL at 09:02

## 2017-02-05 RX ADMIN — MEROPENEM AND SODIUM CHLORIDE 1 G: 1 INJECTION, SOLUTION INTRAVENOUS at 05:02

## 2017-02-05 RX ADMIN — MINERAL SUPPLEMENT IRON 300 MG / 5 ML STRENGTH LIQUID 100 PER BOX UNFLAVORED 300 MG: at 09:02

## 2017-02-05 RX ADMIN — LACTOBACILLUS TAB 1 TABLET: TAB at 05:02

## 2017-02-05 RX ADMIN — VANCOMYCIN HYDROCHLORIDE 1000 MG: 1 INJECTION, POWDER, LYOPHILIZED, FOR SOLUTION INTRAVENOUS at 01:02

## 2017-02-05 RX ADMIN — LEVETIRACETAM 500 MG: 500 SOLUTION ORAL at 09:02

## 2017-02-05 RX ADMIN — GLYCOPYRROLATE 1 MG: 1 TABLET ORAL at 02:02

## 2017-02-05 RX ADMIN — LACTOBACILLUS TAB 1 TABLET: TAB at 12:02

## 2017-02-05 RX ADMIN — PANTOPRAZOLE SODIUM 40 MG: 40 INJECTION, POWDER, FOR SOLUTION INTRAVENOUS at 09:02

## 2017-02-05 RX ADMIN — LACTOBACILLUS TAB 1 TABLET: TAB at 08:02

## 2017-02-05 RX ADMIN — GLYCOPYRROLATE 1 MG: 1 TABLET ORAL at 09:02

## 2017-02-05 RX ADMIN — POLYETHYLENE GLYCOL (3350) 17 G: 17 POWDER, FOR SOLUTION ORAL at 09:02

## 2017-02-05 NOTE — CONSULTS
Emilie Proctor Sr. [id number] is a 66 y.o. male who has been consulted for vancomycin dosing.    The patient has the following labs:     Date Creatinine (mg/dl)    BUN WBC Count   2/5/2017 Estimated Creatinine Clearance: 122 mL/min (based on Cr of 0.6). Lab Results   Component Value Date    BUN 16 02/05/2017     Lab Results   Component Value Date    WBC 7.30 02/05/2017        Current weight is 70.3 kg (154 lb 15.7 oz)    Vancomycin trough 20.1. The patient will be changed to a vancomycin dose of 750 mg every 12 hours. A vancomycin trough has been ordered prior to 4th dose due 02/07 at 130.      Patient will be followed by pharmacy for changes in renal function, toxicity, and efficacy.  Thank you for allowing us to participate in this patient's care.     Nadiya Foster

## 2017-02-05 NOTE — PLAN OF CARE
Problem: Patient Care Overview  Goal: Plan of Care Review  Outcome: Ongoing (interventions implemented as appropriate)  Temp max 99.2 ax. Tolerating tube feeds. Incontinent of medium stool. Iv at KVO. On rotation bed.

## 2017-02-05 NOTE — PROGRESS NOTES
"Progress Note  [facility] Medicine    Admit Date: 2/2/2017    SUBJECTIVE:     Follow-up For:  HCAP (healthcare-associated pneumonia)      Interval history (See H&P for complete P,F,SHx) : Patient remains at neurologic baseline on vent. Reduced vent settings today. No events overnight.    Review of Systems: List if applicable  Review of Systems   Unable to perform ROS: Patient nonverbal         OBJECTIVE:     Vital Signs Range (Last 24H):  Temp:  [97.4 °F (36.3 °C)-99.3 °F (37.4 °C)]   Pulse:  [60-84]   Resp:  [19-32]   BP: (117-162)/(63-85)   SpO2:  [95 %-99 %]     I & O (Last 24H):    Intake/Output Summary (Last 24 hours) at 02/04/17 2310  Last data filed at 02/04/17 1800   Gross per 24 hour   Intake           4628.5 ml   Output             1730 ml   Net           2898.5 ml       Estimated body mass index is 22.89 kg/(m^2) as calculated from the following:    Height as of this encounter: 5' 9" (1.753 m).    Weight as of this encounter: 70.3 kg (154 lb 15.7 oz).    Physical Exam   Constitutional:   Chronically ill, malnourished, contracted patient who is nonverbal   HENT:   Nose: Nose normal.   Mouth/Throat: Oropharynx is clear and moist.   Eyes:   Pupils constricted and poorly responsive   Neck: Neck supple. No thyromegaly present.   Trach in place with ventilator attached.   Cardiovascular: Regular rhythm.  Exam reveals no gallop and no friction rub.    Murmur heard.  Tachycardic with murmur   Pulmonary/Chest: No respiratory distress. He has no wheezes.   Increased effort. Breath sounds coarse on ventilator.   Abdominal: Soft. He exhibits no distension. There is no tenderness. There is no guarding.   Noted J tube/ G tube with intact sites.   Musculoskeletal: He exhibits edema and deformity. He exhibits no tenderness.   Chronically contracted extremities   Neurological: He is alert. He displays abnormal reflex. A cranial nerve deficit is present. He exhibits abnormal muscle tone. Coordination abnormal.   Chronic " contracted extremities. Nonverbal in chronic comatose state   Skin: No rash noted. He is not diaphoretic. No erythema.   Multiple skin breakdown sites identified.   Nursing note and vitals reviewed.        Laboratory/Diagnostic Data:  Reviewed and noted in plan where applicable- Please see chart for full lab data.    Medications:  Medication list was reviewed and changes noted under Assessment/Plan.    ASSESSMENT/PLAN:     Active Problems:    Active Hospital Problems    Diagnosis  POA    *HCAP (healthcare-associated pneumonia) [J18.9]-  Continue broad spectrum ABX. Reduce FiO2 to baseline from NH. Continue nebs. Poor overall prognosis.  Yes    Sepsis [A41.9]-  S/p fluids. No S/Sx of shock. Continue Broad spectrum ABX with h/o MDRO.  Yes    Chronic respiratory failure [J96.10]- Adjust vent, and restore chromic settings-   Vent Mode: A/C  Oxygen Concentration (%):  [30-40] 30  Resp Rate Total:  [22 br/min-36 br/min] 22 br/min  Vt Set:  [500 mL] 500 mL  PEEP/CPAP:  [5 cmH20-7 cmH20] 5 cmH20  Pressure Support:  [0 cmH20] 0 cmH20  Mean Airway Pressure:  [8.1 gyJ38-04 cmH20] 8.1 cmH20    Yes    Gastrostomy status [Z93.1]-  Routine care  Not Applicable    Protein-calorie malnutrition, moderate [E44.0]-  Continue tube feeds.  Yes    Anoxic brain injury [G93.1]-  Likely very poor short term prognosis. Would speak with family if available.  Yes    History of CVA (cerebrovascular accident) [Z86.73]  Not Applicable    Seizure disorder [G40.909]- i Continue keppra.  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   Yes    Tracheostomy in place [Z93.0]-  Routine care  Not Applicable    Functional quadriplegia [R53.2]-  No functional quality of life or ability to improve.  Yes    Jejunostomy tube in situ [Z93.4]-  Routine care  Not Applicable      Resolved Hospital Problems    Diagnosis Date Resolved POA   No resolved problems to display.         Disposition- SNF    VTE Risk Mitigation          Ordered     enoxaparin injection 40 mg  Daily     Route:  Subcutaneous        02/02/17 1947     Low Risk of VTE  Once      02/02/17 1947        Time spent in critical care (in addition to E/M time mentioned above) Time spent to titrate meds, adjust ventilator settings, and provide counseling and coordination to critical care team 47 min

## 2017-02-05 NOTE — PLAN OF CARE
Problem: Patient Care Overview  Goal: Plan of Care Review  Outcome: Revised  More alert today. Tolerating TF at goal. Antibiotic treatment revised. No c/o pain or discomfort. Moisture barrier applied to healed pressure ulcer on sacrum. Safety maintained.

## 2017-02-05 NOTE — PROGRESS NOTES
Progress Note  PULMONARY    Admit Date: 2/2/2017 2/5/2017        SUBJECTIVE:     Feb 4, actually animated and following commands today.  No c/o or distress  Feb 5, not arousing much this am, no distress apparent    PFSH and Allergies reviewed.  Pt's neuro status prevents obtaining ros.    OBJECTIVE:     Vitals (Most recent):  Vitals:    02/05/17 0844   BP:    Pulse: 65   Resp: (!) 22   Temp:        Vitals (24 hour range):  Temp:  [97.4 °F (36.3 °C)-99.3 °F (37.4 °C)]   Pulse:  [62-91]   Resp:  [19-35]   BP: (107-149)/(58-81)   SpO2:  [93 %-99 %]       Intake/Output Summary (Last 24 hours) at 02/05/17 0915  Last data filed at 02/05/17 0504   Gross per 24 hour   Intake             3230 ml   Output             3571 ml   Net             -341 ml          Physical Exam:  The patient's neuro status (alertness,orientation,cognitive function,motor skills,), pharyngeal exam (oral lesions, hygiene, abn dentition,), Neck (jvd,mass,thyroid,nodes in neck and above/below clavicle),RESPIRATORY(symmetry,effort,fremitus,percussion,auscultation),  Cor(rhythm,heart tones including gallops,perfusion,edema)ABD(distention,hepatic&splenomegaly,tenderness,masses), Skin(rash,cyanosis),Psyc(affect,judgement,).  Exam negative except for these pertinent findings:    Trach, secretions controlled, no ecxess trach secretions, no edema, good bs.  Stable exam 2/5/2017      Diagnostic Results:  All labs last 4 entries of CBC,CMP/BMP,AGB, MICRO reviewed.  All rediographs of chest, lungs, & abd reviewed by direct vision last 3 days.   Results for KENNA ALEXIS SR. (MRN [id number]) as of 2/5/2017 09:14   Ref. Range 2/5/2017 03:27   WBC Latest Ref Range: 3.90 - 12.70 K/uL 7.30   RBC Latest Ref Range: 4.60 - 6.20 M/uL 3.55 (L)   Hemoglobin Latest Ref Range: 14.0 - 18.0 g/dL 9.2 (L)   Hematocrit Latest Ref Range: 40.0 - 54.0 % 29.4 (L)   MCV Latest Ref Range: 82 - 98 fL 83   MCH Latest Ref Range: 27.0 - 31.0 pg 26.0 (L)   MCHC Latest Ref Range:  32.0 - 36.0 % 31.4 (L)   RDW Latest Ref Range: 11.5 - 14.5 % 18.4 (H)   Platelets Latest Ref Range: 150 - 350 K/uL 225   MPV Latest Ref Range: 9.2 - 12.9 fL 8.9 (L)   Gran% Latest Ref Range: 38.0 - 73.0 % 57.4   Gran # Latest Ref Range: 1.8 - 7.7 K/uL 4.2   Lymph% Latest Ref Range: 18.0 - 48.0 % 33.2   Lymph # Latest Ref Range: 1.0 - 4.8 K/uL 2.4   Mono% Latest Ref Range: 4.0 - 15.0 % 6.8   Mono # Latest Ref Range: 0.3 - 1.0 K/uL 0.5   Eosinophil% Latest Ref Range: 0.0 - 8.0 % 2.2   Eos # Latest Ref Range: 0.0 - 0.5 K/uL 0.2   Basophil% Latest Ref Range: 0.0 - 1.9 % 0.4   Baso # Latest Ref Range: 0.00 - 0.20 K/uL 0.00   Sodium Latest Ref Range: 136 - 145 mmol/L 131 (L)   Potassium Latest Ref Range: 3.5 - 5.1 mmol/L 4.1   Chloride Latest Ref Range: 95 - 110 mmol/L 103   CO2 Latest Ref Range: 23 - 29 mmol/L 21 (L)   Anion Gap Latest Ref Range: 8 - 16 mmol/L 7 (L)   BUN, Bld Latest Ref Range: 8 - 23 mg/dL 16   Creatinine Latest Ref Range: 0.5 - 1.4 mg/dL 0.6   eGFR if non African American Latest Ref Range: >60 mL/min/1.73 m^2 >60   eGFR if  Latest Ref Range: >60 mL/min/1.73 m^2 >60   Glucose Latest Ref Range: 70 - 110 mg/dL 101   Calcium Latest Ref Range: 8.7 - 10.5 mg/dL 8.4 (L)   Phosphorus Latest Ref Range: 2.7 - 4.5 mg/dL 2.6 (L)   Magnesium Latest Ref Range: 1.6 - 2.6 mg/dL 1.6   Alkaline Phosphatase Latest Ref Range: 55 - 135 U/L 117   Total Protein Latest Ref Range: 6.0 - 8.4 g/dL 7.6   Albumin Latest Ref Range: 3.5 - 5.2 g/dL 2.1 (L)   Total Bilirubin Latest Ref Range: 0.1 - 1.0 mg/dL 1.0          ASSESSMENT/PLAN:     Patient Active Problem List   Diagnosis    Aortic dissection distal to left subclavian    Hemiplegia as late effect of stroke    Tracheostomy in place    Functional quadriplegia    Jejunostomy tube in situ    Tracheo-esophageal fistula    Anemia, chronic disease    HCAP (healthcare-associated pneumonia)    Acute on chronic respiratory failure with hypercapnia     Gastrostomy status    Protein-calorie malnutrition, moderate    Seizure disorder    History of CVA (cerebrovascular accident)    Anoxic brain injury    Aspiration into airway    Abnormal CXR    Malfunctioning jejunostomy tube    Acute cystitis with hematuria    Chronic respiratory failure    Transaminitis    Sepsis    Pneumonia     Specialty Problems        Pulmonary Problems    Tracheostomy in place        Tracheo-esophageal fistula        * (Principal)HCAP (healthcare-associated pneumonia)        Acute on chronic respiratory failure with hypercapnia        Aspiration into airway        Chronic respiratory failure        Pneumonia                  rec  Feb 4,   Sputum culture here may not be helpful.  Pt has responded to current abx regimen.  Would recommend  7 day course since good rapid response seen with current abx. With expected active tracheoesphageal fistula- prolonged abx if not needed.certainly will not improve picture but WORSEN.  Could potentially complete abx at [facility]?  Feb 5, looks to be  Responding well.  Consider back to [facility]- complete abx at [facility]?                                      .

## 2017-02-05 NOTE — PLAN OF CARE
Problem: Patient Care Overview  Goal: Plan of Care Review  Outcome: Ongoing (interventions implemented as appropriate)  Pt remains on chronic vent, has low grade fever.good urine output. No falls this shift, pt tolerating tube feeds well, no stool this shift

## 2017-02-06 LAB
ALBUMIN SERPL BCP-MCNC: 2 G/DL
ALP SERPL-CCNC: 107 U/L
ALT SERPL W/O P-5'-P-CCNC: 22 U/L
ANION GAP SERPL CALC-SCNC: 7 MMOL/L
AST SERPL-CCNC: 21 U/L
BASOPHILS # BLD AUTO: 0 K/UL
BASOPHILS NFR BLD: 0.2 %
BILIRUB SERPL-MCNC: 1 MG/DL
BUN SERPL-MCNC: 17 MG/DL
CALCIUM SERPL-MCNC: 8.1 MG/DL
CHLORIDE SERPL-SCNC: 102 MMOL/L
CO2 SERPL-SCNC: 23 MMOL/L
CREAT SERPL-MCNC: 0.7 MG/DL
DIFFERENTIAL METHOD: ABNORMAL
EOSINOPHIL # BLD AUTO: 0.1 K/UL
EOSINOPHIL NFR BLD: 1.2 %
ERYTHROCYTE [DISTWIDTH] IN BLOOD BY AUTOMATED COUNT: 18.1 %
EST. GFR  (AFRICAN AMERICAN): >60 ML/MIN/1.73 M^2
EST. GFR  (NON AFRICAN AMERICAN): >60 ML/MIN/1.73 M^2
GLUCOSE SERPL-MCNC: 109 MG/DL
HCT VFR BLD AUTO: 27.2 %
HGB BLD-MCNC: 8.8 G/DL
LYMPHOCYTES # BLD AUTO: 3 K/UL
LYMPHOCYTES NFR BLD: 36.8 %
MCH RBC QN AUTO: 26.2 PG
MCHC RBC AUTO-ENTMCNC: 32.3 %
MCV RBC AUTO: 81 FL
MONOCYTES # BLD AUTO: 0.7 K/UL
MONOCYTES NFR BLD: 8.3 %
NEUTROPHILS # BLD AUTO: 4.4 K/UL
NEUTROPHILS NFR BLD: 53.5 %
PLATELET # BLD AUTO: 233 K/UL
PMV BLD AUTO: 8.5 FL
POTASSIUM SERPL-SCNC: 3.9 MMOL/L
PROT SERPL-MCNC: 7.3 G/DL
RBC # BLD AUTO: 3.35 M/UL
SODIUM SERPL-SCNC: 132 MMOL/L
WBC # BLD AUTO: 8.2 K/UL

## 2017-02-06 PROCEDURE — 99232 SBSQ HOSP IP/OBS MODERATE 35: CPT | Mod: ,,, | Performed by: INTERNAL MEDICINE

## 2017-02-06 PROCEDURE — 87040 BLOOD CULTURE FOR BACTERIA: CPT | Mod: 59

## 2017-02-06 PROCEDURE — 99233 SBSQ HOSP IP/OBS HIGH 50: CPT | Mod: ,,, | Performed by: INTERNAL MEDICINE

## 2017-02-06 PROCEDURE — 85025 COMPLETE CBC W/AUTO DIFF WBC: CPT

## 2017-02-06 PROCEDURE — 94761 N-INVAS EAR/PLS OXIMETRY MLT: CPT

## 2017-02-06 PROCEDURE — 63600175 PHARM REV CODE 636 W HCPCS: Performed by: NURSE PRACTITIONER

## 2017-02-06 PROCEDURE — C9113 INJ PANTOPRAZOLE SODIUM, VIA: HCPCS | Performed by: NURSE PRACTITIONER

## 2017-02-06 PROCEDURE — 63600175 PHARM REV CODE 636 W HCPCS: Performed by: INTERNAL MEDICINE

## 2017-02-06 PROCEDURE — 80053 COMPREHEN METABOLIC PANEL: CPT

## 2017-02-06 PROCEDURE — 94770 HC EXHALED C02 TEST: CPT

## 2017-02-06 PROCEDURE — 25000003 PHARM REV CODE 250: Performed by: INTERNAL MEDICINE

## 2017-02-06 PROCEDURE — 25000003 PHARM REV CODE 250: Performed by: NURSE PRACTITIONER

## 2017-02-06 PROCEDURE — 27000221 HC OXYGEN, UP TO 24 HOURS

## 2017-02-06 PROCEDURE — 25000003 PHARM REV CODE 250: Performed by: HOSPITALIST

## 2017-02-06 PROCEDURE — 36415 COLL VENOUS BLD VENIPUNCTURE: CPT

## 2017-02-06 PROCEDURE — 20000000 HC ICU ROOM

## 2017-02-06 PROCEDURE — 94003 VENT MGMT INPAT SUBQ DAY: CPT

## 2017-02-06 RX ORDER — ACETAMINOPHEN 650 MG/20.3ML
650 LIQUID ORAL EVERY 4 HOURS PRN
Status: DISCONTINUED | OUTPATIENT
Start: 2017-02-06 | End: 2017-02-14 | Stop reason: HOSPADM

## 2017-02-06 RX ADMIN — CARVEDILOL 12.5 MG: 6.25 TABLET, FILM COATED ORAL at 08:02

## 2017-02-06 RX ADMIN — MINERAL SUPPLEMENT IRON 300 MG / 5 ML STRENGTH LIQUID 100 PER BOX UNFLAVORED 300 MG: at 08:02

## 2017-02-06 RX ADMIN — ENOXAPARIN SODIUM 40 MG: 100 INJECTION SUBCUTANEOUS at 11:02

## 2017-02-06 RX ADMIN — ACETAMINOPHEN 650 MG: 160 SOLUTION ORAL at 09:02

## 2017-02-06 RX ADMIN — MEROPENEM AND SODIUM CHLORIDE 1 G: 1 INJECTION, SOLUTION INTRAVENOUS at 05:02

## 2017-02-06 RX ADMIN — ACETAMINOPHEN 650 MG: 160 SOLUTION ORAL at 05:02

## 2017-02-06 RX ADMIN — LEVETIRACETAM 500 MG: 500 SOLUTION ORAL at 09:02

## 2017-02-06 RX ADMIN — POLYETHYLENE GLYCOL (3350) 17 G: 17 POWDER, FOR SOLUTION ORAL at 08:02

## 2017-02-06 RX ADMIN — LEVETIRACETAM 500 MG: 500 SOLUTION ORAL at 08:02

## 2017-02-06 RX ADMIN — ROSUVASTATIN CALCIUM 5 MG: 5 TABLET, FILM COATED ORAL at 08:02

## 2017-02-06 RX ADMIN — CHLORHEXIDINE GLUCONATE 15 ML: 1.2 RINSE ORAL at 08:02

## 2017-02-06 RX ADMIN — PANTOPRAZOLE SODIUM 40 MG: 40 INJECTION, POWDER, FOR SOLUTION INTRAVENOUS at 08:02

## 2017-02-06 RX ADMIN — MINERAL SUPPLEMENT IRON 300 MG / 5 ML STRENGTH LIQUID 100 PER BOX UNFLAVORED 300 MG: at 09:02

## 2017-02-06 RX ADMIN — LACTOBACILLUS TAB 1 TABLET: TAB at 11:02

## 2017-02-06 RX ADMIN — LACTOBACILLUS TAB 1 TABLET: TAB at 05:02

## 2017-02-06 RX ADMIN — CHLORHEXIDINE GLUCONATE 15 ML: 1.2 RINSE ORAL at 09:02

## 2017-02-06 RX ADMIN — GLYCOPYRROLATE 1 MG: 1 TABLET ORAL at 01:02

## 2017-02-06 RX ADMIN — LACTOBACILLUS TAB 1 TABLET: TAB at 08:02

## 2017-02-06 RX ADMIN — CARVEDILOL 12.5 MG: 6.25 TABLET, FILM COATED ORAL at 09:02

## 2017-02-06 RX ADMIN — GLYCOPYRROLATE 1 MG: 1 TABLET ORAL at 05:02

## 2017-02-06 RX ADMIN — VANCOMYCIN HYDROCHLORIDE 750 MG: 750 INJECTION, POWDER, LYOPHILIZED, FOR SOLUTION INTRAVENOUS at 01:02

## 2017-02-06 RX ADMIN — GLYCOPYRROLATE 1 MG: 1 TABLET ORAL at 09:02

## 2017-02-06 NOTE — PROGRESS NOTES
02/06/17 0716   Patient Assessment/Suction   All Lung Fields Breath Sounds coarse   Cough Frequency infrequent   Cough Type good   Suction Method in-line suction catheter (closed);tracheostomy   Sputum Amount moderate   Sputum Color tan   Sputum Consistency thick   PRE-TX-O2-ETCO2   Oxygen Concentration (%) 30   SpO2 (!) 93 %   ETCO2 (mmHg) 32 mmHg   Pulse 75   Resp (!) 36       Surgical Airway Shiley Long;Distal;Cuffed   No Placement Date or Time found.   Present Prior to Hospital Arrival?: Yes  Type: Tracheostomy  Brand: Shiley  Airway Device Size: 7.0  Style: Long;Distal;Cuffed   Cuff Pressure (20)   Status Secured   Site Assessment Clean;Dry   Ties Assessment Clean;Dry;Intact;Secure   Vent Select   Conventional Vent Y   $ Ventilator Charges Ventilator Subsequent   Preset Conventional Ventilator Settings   Vent Type    Ventilation Type VC   Vent Mode A/C   Set Rate 22 bmp   Vt Set 500 mL   PEEP/CPAP 5 cmH20   Pressure Support 0 cmH20   Waveform RAMP   Peak Flow 55 L/min   Set Inspiratory Pressure 0 cmH20   Insp Time 0 Sec(s)   Plateau Set/Insp. Hold (sec) 0   Insp Rise Time  0 %   Trigger Sensitivity Flow/I-Trigger 1.5 L/min   P High 0 cm H2O   P Low 0 cm H2O   T High 0 sec   T Low 0 sec   Patient Ventilator Parameters   Resp Rate Total 28 br/min   Peak Airway Pressure 50 cmH2O   Mean Airway Pressure 13 cmH20   Exhaled Vt 687 mL   Total Ve 12.9 mL   Spont Ve 0 L   I:E Ratio Measured 1:1.70   Conventional Ventilator Alarms   Ve High Alarm 24 L/min   Resp Rate High Alarm 50 br/min   Press High Alarm 50 cmH2O   Apnea Rate 10   Apnea Volume (mL) 500 mL   Apnea Oxygen Concentration  100   Apnea Flow Rate (L/min) 60   T Apnea 20 sec(s)   Ready to Wean/Extubation Screen   FIO2<60 (chart decimal) 0.3   MV<16L (chart vol.) 12.9   PEEP <10 (chart #) 5   Ready to Wean Parameters   F/VT Ratio<105 (RSBI) (!) 52.4   Airway Safety   Ambu bag with the patient? Yes, Adult Ambu   Is mask with the patient? Yes, Adult Mask        Patient maintained on ventilator at documented settings.

## 2017-02-06 NOTE — PROGRESS NOTES
"Progress Note  Hospital Medicine  Patient Name:Emilie Proctor Sr.  MRN:  [id number]  Patient Class: IP- Inpatient  Admit Date: 2/2/2017  Length of Stay: 4 days  Expected Discharge Date:   Attending Physician: Socorro Chicas MD  Primary Care Provider:  Marcial Avila MD    SUBJECTIVE:     Principal Problem: HCAP (healthcare-associated pneumonia)  Initial history of present illness: Patient is a 66 y.o. male admitted to Hospitalist Service from [facility] with complaint of "Pneumonia". Patient has PMH significant for anoxic brain injury, hypertension, seizures, hyperlipidemia, chronic respiratory failure, history of alcohol and drug abuse, history of CVA and DM-2. EMS reported patient had worsening chest XRs which showed bilateral pneumonia. Patient has a temperature of 100.8 degrees F. Patient was sent over for further evaluation of possible aspiration. Patient not able to communicate. Looks ill and exhausted.    PMH/PSH/SH/FH/Meds: reviewed.    Symptoms/Review of Systems: Still spiking fever' blood cultures taken. No dstress  Diet:  PEG/J-tube    Activity level: Bed bound  Pain:  No acute distress    OBJECTIVE:   Vital Signs (Most Recent):      Temp: 99.4 °F (37.4 °C) (02/06/17 0715)  Pulse: 65 (02/06/17 1000)  Resp: (!) 23 (02/06/17 1000)  BP: (!) 89/55 (02/06/17 1000)  SpO2: 100 % (02/06/17 1000)       Vital Signs Range (Last 24H):  Temp:  [99.3 °F (37.4 °C)-101.6 °F (38.7 °C)]   Pulse:  [63-82]   Resp:  [22-36]   BP: ()/(55-80)   SpO2:  [90 %-100 %]     Weight: 70.3 kg (154 lb 15.7 oz)  Body mass index is 22.89 kg/(m^2).    Intake/Output Summary (Last 24 hours) at 02/06/17 1056  Last data filed at 02/06/17 0745   Gross per 24 hour   Intake             1270 ml   Output             2801 ml   Net            -1531 ml     Physical Examination:  General appearance: well developed, appears stated age, chronically ill appearing  Head: normocephalic, atraumatic  Eyes: conjunctivae/corneas clear. " PERRL.  Nose: Nares normal. Septum midline.  Throat: lips, mucosa, and tongue normal; teeth and gums normal, no throat erythema.  Neck: supple, symmetrical, trachea midline, no JVD and thyroid not enlarged, symmetric, no tenderness/mass/nodules. Trach noted.  Lungs: clear to auscultation bilaterally and normal respiratory effort  Chest wall: no tenderness  Heart: regular rate and rhythm, S1, S2 normal, no murmur, click, rub or gallop  Abdomen: soft, non-tender non-distented; bowel sounds normal; no masses, no organomegaly. PEG and J tube site with scant discharge  Extremities: no cyanosis, clubbing or edema.   Pulses: 2+ and symmetric  Skin: Skin color, texture, turgor normal. Stage 2 sacral decubitus (refer to nursing assessment)  Lymph nodes: Cervical, supraclavicular, and axillary nodes normal.  Neurologic: Anoxic brain injury. Non-interactive.    CBC:    Recent Labs  Lab 02/04/17  0353 02/05/17  0327 02/06/17  0349   WBC 10.10 7.30 8.20   RBC 3.22* 3.55* 3.35*   HGB 8.5* 9.2* 8.8*   HCT 26.9* 29.4* 27.2*    225 233   MCV 84 83 81*   MCH 26.3* 26.0* 26.2*   MCHC 31.5* 31.4* 32.3   BMP    Recent Labs  Lab 02/04/17  0353 02/05/17 0327 02/06/17  0348   * 101 109    131* 132*   K 3.4* 4.1 3.9    103 102   CO2 23 21* 23   BUN 25* 16 17   CREATININE 0.7 0.6 0.7   CALCIUM 8.2* 8.4* 8.1*   MG 1.7 1.6  --       Diagnostic Results:  Microbiology Results (last 7 days)     Procedure Component Value Units Date/Time    Blood culture [[id number]] Collected:  02/06/17 0628    Order Status:  Sent Specimen:  Blood Updated:  02/06/17 0631    Blood culture [[id number]] Collected:  02/03/17 1830    Order Status:  Completed Specimen:  Blood Updated:  02/06/17 0612     Blood Culture, Routine No Growth to date     Blood Culture, Routine No Growth to date     Blood Culture, Routine No Growth to date    Blood culture [[id number]] Collected:  02/06/17 0601    Order Status:  Sent Specimen:  Blood Updated:  02/06/17  0602    Blood culture [[id number]]     Order Status:  Canceled Specimen:  Blood     Narrative:       Culture Blood was cancelled on 02/06/2017 at 06:02 by [facility]; Duplicate   order    Blood culture [[id number]]     Order Status:  Canceled Specimen:  Blood     Narrative:       Culture Blood was cancelled on 02/06/2017 at 06:02 by [facility]; Duplicate   order    Blood culture #1 **CANNOT BE ORDERED STAT** [[id number]] Collected:  02/02/17 1613    Order Status:  Completed Specimen:  Blood Updated:  02/05/17 2322     Blood Culture, Routine No Growth to date     Blood Culture, Routine No Growth to date     Blood Culture, Routine No Growth to date     Blood Culture, Routine No Growth to date    Blood culture #2 **CANNOT BE ORDERED STAT** [[id number]] Collected:  02/02/17 1647    Order Status:  Completed Specimen:  Blood Updated:  02/05/17 2322     Blood Culture, Routine No Growth to date     Blood Culture, Routine No Growth to date     Blood Culture, Routine No Growth to date     Blood Culture, Routine No Growth to date    Urine culture **CANNOT BE ORDERED STAT** [[id number]] Collected:  02/02/17 1628    Order Status:  Completed Specimen:  Urine from Urine, Catheterized Updated:  02/04/17 0310     Urine Culture, Routine No growth      Chest X-Ray: 1.  Stable radiographic appearance to the chest.  Persistent bilateral pulmonary opacities are noted for which differential considerations include pneumonia and aspiration in this patient with history of esophageal stent.  Small left pleural effusion noted.  2.  Stable dilatation of the aorta in this patient with known type B thoracic dissection.    CXR: Stable examination demonstrating patchy bilateral airspace disease likely reflecting pneumonia.    Assessment/Plan:   Sepsis with HCAP  HCAP  Supplemental O2 via nasal canula; titrate O2 saturation to >92%.   Pulmonary consultation.   Continue beta 2 agonist bronchodilator treatments.   Continue IV antibiotics - Merrem and  Vancomycin.  Pharmacy to dose Vanco.  Check sputum GS and Cx.   Continue routine medications as before.      Chronic respiratory failure  Continue mechanical ventilation  Continuous oximetry  Nebulized bronchodilator     Hemiplegia as late effect of stroke  Turn and position      Tracheostomy in place  Continue routine tracheostomy care      Functional quadriplegia  Turn and position      Gastrostomy status  Keep g tube open to gravity      Seizure disorder  Continue Levetiracetam, check level.  VTE Risk Mitigation         Ordered     enoxaparin injection 40 mg  Daily     Route:  Subcutaneous        02/02/17 1947     Low Risk of VTE  Once      02/02/17 1947        Socorro Chicas MD  Department of Hospital Medicine   [facility]

## 2017-02-06 NOTE — PROGRESS NOTES
"Progress Note  [facility] Medicine    Admit Date: 2/2/2017    SUBJECTIVE:     Follow-up For:  HCAP (healthcare-associated pneumonia)      Interval history (See H&P for complete P,F,SHx) : Patient remains at neurologic baseline on vent. Vent settings stable. No events overnight.    Review of Systems: List if applicable  Review of Systems   Unable to perform ROS: Patient nonverbal       OBJECTIVE:     Vital Signs Range (Last 24H):  Temp:  [97.8 °F (36.6 °C)-100.5 °F (38.1 °C)]   Pulse:  [61-91]   Resp:  [22-35]   BP: (107-142)/(56-80)   SpO2:  [91 %-99 %]     I & O (Last 24H):    Intake/Output Summary (Last 24 hours) at 02/05/17 2207  Last data filed at 02/05/17 1700   Gross per 24 hour   Intake             1160 ml   Output             4301 ml   Net            -3141 ml       Estimated body mass index is 22.89 kg/(m^2) as calculated from the following:    Height as of this encounter: 5' 9" (1.753 m).    Weight as of this encounter: 70.3 kg (154 lb 15.7 oz).    Physical Exam   Constitutional:   Chronically ill, malnourished, contracted patient who is nonverbal   HENT:   Nose: Nose normal.   Mouth/Throat: Oropharynx is clear and moist.   Eyes:   Pupils constricted and poorly responsive   Neck: Neck supple. No thyromegaly present.   Trach in place with ventilator attached.   Cardiovascular: Regular rhythm.  Exam reveals no gallop and no friction rub.    Murmur heard.  Tachycardic with murmur   Pulmonary/Chest: No respiratory distress. He has no wheezes.   Increased effort. Breath sounds coarse on ventilator.   Abdominal: Soft. He exhibits no distension. There is no tenderness. There is no guarding.   Noted J tube/ G tube with intact sites.   Musculoskeletal: He exhibits edema and deformity. He exhibits no tenderness.   Chronically contracted extremities   Neurological: He is alert. He displays abnormal reflex. A cranial nerve deficit is present. He exhibits abnormal muscle tone. Coordination abnormal.   Chronic contracted " extremities. Nonverbal in chronic comatose state   Skin: No rash noted. He is not diaphoretic. No erythema.   Multiple skin breakdown sites identified.   Nursing note and vitals reviewed.        Laboratory/Diagnostic Data:  Reviewed and noted in plan where applicable- Please see chart for full lab data.    Medications:  Medication list was reviewed and changes noted under Assessment/Plan.    ASSESSMENT/PLAN:     Active Problems:    Active Hospital Problems    Diagnosis  POA    *HCAP (healthcare-associated pneumonia) [J18.9]-  Continue broad spectrum ABX + h/o ESBL x7-10 days. Reduced FiO2 to baseline from NH. Continue nebs. Poor overall prognosis.  Yes    Sepsis [A41.9]-  S/p fluids. No S/Sx of shock. Continue Broad spectrum ABX with h/o MDRO.  Yes    Chronic respiratory failure [J96.10]- Adjusted vent, and restore chronic settings-   Vent Mode: A/C  Oxygen Concentration (%):  [30] 30  Resp Rate Total:  [22 br/min-34 br/min] 22 br/min  Vt Set:  [500 mL] 500 mL  PEEP/CPAP:  [5 cmH20] 5 cmH20  Pressure Support:  [0 cmH20] 0 cmH20  Mean Airway Pressure:  [11 ifY70-06 cmH20] 11 cmH20    Yes    Gastrostomy status [Z93.1]-  Routine care  Not Applicable    Protein-calorie malnutrition, moderate [E44.0]-  Continue tube feeds.  Yes    Anoxic brain injury [G93.1]-  Likely very poor short term prognosis. Would speak with family if available.  Yes    History of CVA (cerebrovascular accident) [Z86.73]  Not Applicable    Seizure disorder [G40.909]- i Continue keppra.  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   Yes    Tracheostomy in place [Z93.0]-  Routine care  Not Applicable    Functional quadriplegia [R53.2]-  No functional quality of life or ability to improve.  Yes    Jejunostomy tube in situ [Z93.4]-  Routine care  Not Applicable      Resolved Hospital Problems    Diagnosis Date Resolved POA   No resolved problems to display.       Disposition- SNF    VTE Risk Mitigation          Ordered     enoxaparin injection 40 mg  Daily     Route:  Subcutaneous        02/02/17 1947     Low Risk of VTE  Once      02/02/17 1947        Time spent in critical care (in addition to E/M time mentioned above) Time spent to titrate meds, adjust ventilator settings, and provide counseling and coordination to critical care team 32 min

## 2017-02-06 NOTE — PROGRESS NOTES
Attempted to speak to pt's family.  Called pt's sister Ana, [phone].  Phone would ring then disconnect x 2; no ability to leave a message.  Left message for pt's son Emilie at [phone] requesting he call me and left message at [phone] requesting someone call me to complete pt's assessment.

## 2017-02-06 NOTE — PROGRESS NOTES
Pt sleeping on trach/vent.  No family in the room.  Pt is a resident of [place] and nonverbal.  CM will follow up with pt's family to do the assessment.

## 2017-02-06 NOTE — PLAN OF CARE
Problem: Patient Care Overview  Goal: Plan of Care Review  Outcome: Ongoing (interventions implemented as appropriate)  Temp max 101.6 orally bld cultures ordered tolerating tube feeds incontinent stool. Copious secretions suctioned per trach.

## 2017-02-06 NOTE — PROGRESS NOTES
Progress Note  PULMONARY    Admit Date: 2/2/2017 2/6/2017        SUBJECTIVE:     Feb 4, actually animated and following commands today.  No c/o or distress  Feb 5, not arousing much this am, no distress apparent  Feb 6, alert and seems to interact. No distress  PFSH and Allergies reviewed.  Pt's neuro status prevents obtaining ros.    OBJECTIVE:     Vitals (Most recent):  Vitals:    02/06/17 1700   BP: 125/60   Pulse: 67   Resp: (!) 23   Temp:        Vitals (24 hour range):  Temp:  [98.8 °F (37.1 °C)-101.6 °F (38.7 °C)]   Pulse:  [61-82]   Resp:  [21-36]   BP: ()/(55-80)   SpO2:  [90 %-100 %]       Intake/Output Summary (Last 24 hours) at 02/06/17 1711  Last data filed at 02/06/17 0745   Gross per 24 hour   Intake             1270 ml   Output             1201 ml   Net               69 ml          Physical Exam:  The patient's neuro status (alertness,orientation,cognitive function,motor skills,), pharyngeal exam (oral lesions, hygiene, abn dentition,), Neck (jvd,mass,thyroid,nodes in neck and above/below clavicle),RESPIRATORY(symmetry,effort,fremitus,percussion,auscultation),  Cor(rhythm,heart tones including gallops,perfusion,edema)ABD(distention,hepatic&splenomegaly,tenderness,masses), Skin(rash,cyanosis),Psyc(affect,judgement,).  Exam negative except for these pertinent findings:    Trach, secretions controlled, no ecxess trach secretions, no edema, good bs.  Stable exam 2/6/2017      Diagnostic Results:  All labs last 4 entries of CBC,CMP/BMP,AGB, MICRO reviewed.  All rediographs of chest, lungs, & abd reviewed by direct vision last 3 days.      Results for FELTKENNA MARIE SR. (MRN [id number]) as of 2/6/2017 16:27   Ref. Range 2/6/2017 03:48 2/6/2017 03:49   WBC Latest Ref Range: 3.90 - 12.70 K/uL  8.20   RBC Latest Ref Range: 4.60 - 6.20 M/uL  3.35 (L)   Hemoglobin Latest Ref Range: 14.0 - 18.0 g/dL  8.8 (L)   Hematocrit Latest Ref Range: 40.0 - 54.0 %  27.2 (L)   MCV Latest Ref Range: 82 - 98 fL  81  (L)   MCH Latest Ref Range: 27.0 - 31.0 pg  26.2 (L)   MCHC Latest Ref Range: 32.0 - 36.0 %  32.3   RDW Latest Ref Range: 11.5 - 14.5 %  18.1 (H)   Platelets Latest Ref Range: 150 - 350 K/uL  233   MPV Latest Ref Range: 9.2 - 12.9 fL  8.5 (L)   Gran% Latest Ref Range: 38.0 - 73.0 %  53.5   Gran # Latest Ref Range: 1.8 - 7.7 K/uL  4.4   Lymph% Latest Ref Range: 18.0 - 48.0 %  36.8   Lymph # Latest Ref Range: 1.0 - 4.8 K/uL  3.0   Mono% Latest Ref Range: 4.0 - 15.0 %  8.3   Mono # Latest Ref Range: 0.3 - 1.0 K/uL  0.7   Eosinophil% Latest Ref Range: 0.0 - 8.0 %  1.2   Eos # Latest Ref Range: 0.0 - 0.5 K/uL  0.1   Basophil% Latest Ref Range: 0.0 - 1.9 %  0.2   Baso # Latest Ref Range: 0.00 - 0.20 K/uL  0.00   Sodium Latest Ref Range: 136 - 145 mmol/L 132 (L)    Potassium Latest Ref Range: 3.5 - 5.1 mmol/L 3.9    Chloride Latest Ref Range: 95 - 110 mmol/L 102    CO2 Latest Ref Range: 23 - 29 mmol/L 23    Anion Gap Latest Ref Range: 8 - 16 mmol/L 7 (L)    BUN, Bld Latest Ref Range: 8 - 23 mg/dL 17    Creatinine Latest Ref Range: 0.5 - 1.4 mg/dL 0.7    eGFR if non African American Latest Ref Range: >60 mL/min/1.73 m^2 >60    eGFR if  Latest Ref Range: >60 mL/min/1.73 m^2 >60    Glucose Latest Ref Range: 70 - 110 mg/dL 109    Calcium Latest Ref Range: 8.7 - 10.5 mg/dL 8.1 (L)    Alkaline Phosphatase Latest Ref Range: 55 - 135 U/L 107    Total Protein Latest Ref Range: 6.0 - 8.4 g/dL 7.3    Albumin Latest Ref Range: 3.5 - 5.2 g/dL 2.0 (L)    Total Bilirubin Latest Ref Range: 0.1 - 1.0 mg/dL 1.0    AST Latest Ref Range: 10 - 40 U/L 21    ALT Latest Ref Range: 10 - 44 U/L 22         Susceptibility       Serratia marcescens Escherichia coli esbl       CULTURE, RESPIRATORY CULTURE, RESPIRATORY       Amikacin <=16  Sensitive <=16  Sensitive       Amox/K Clav'ate  >16/8  Resistant       Amp/Sulbactam  >16/8  Resistant       Ampicillin  >16  Resistant       Cefazolin  >16  Resistant       Cefepime <=8  Sensitive  >16  Resistant       Ceftriaxone <=8  Sensitive >32  Resistant       Ciprofloxacin >2  Resistant >2  Resistant       Ertapenem <=2  Sensitive >4  Resistant       Gentamicin <=4  Sensitive <=4  Sensitive       Meropenem <=4  Sensitive <=4  Sensitive       Piperacillin/Tazo <=16  Sensitive <=16  Sensitive       Tetracycline  >8  Resistant       Tobramycin >8  Resistant 8  Intermediate       Trimeth/Sulfa >2/38  Resistant >2/38  Resistant                      Specimen Collected: 02/02/17 10:28 AM Last Resulted: 02/06/17 12:57 PM Lab Flowsheet Order Details View             ASSESSMENT/PLAN:     Patient Active Problem List   Diagnosis    Aortic dissection distal to left subclavian    Hemiplegia as late effect of stroke    Tracheostomy in place    Functional quadriplegia    Jejunostomy tube in situ    Tracheo-esophageal fistula    Anemia, chronic disease    Pneumonia due to Escherichia coli    Acute on chronic respiratory failure with hypercapnia    Gastrostomy status    Protein-calorie malnutrition, moderate    Seizure disorder    History of CVA (cerebrovascular accident)    Anoxic brain injury    Aspiration into airway    Abnormal CXR    Malfunctioning jejunostomy tube    Acute cystitis with hematuria    Chronic respiratory failure    Transaminitis    Sepsis    Pneumonia due to infectious organism     Specialty Problems        Pulmonary Problems    Tracheostomy in place        Tracheo-esophageal fistula        * (Principal)Pneumonia due to Escherichia coli        Acute on chronic respiratory failure with hypercapnia        Aspiration into airway        Chronic respiratory failure        Pneumonia due to infectious organism                  rec  Feb 4,   Sputum culture here may not be helpful.  Pt has responded to current abx regimen.  Would recommend  7 day course since good rapid response seen with current abx. With expected active tracheoesphageal fistula- prolonged abx if not needed.certainly  will not improve picture but WORSEN.  Could potentially complete abx at [facility]?  Feb 5, looks to be  Responding well.  Consider back to ec- complete abx at [place]?  Feb 6, looks good, alert, sourse fever not clear but wbc good,  cxr na- if cxr stable could go to [place] for abx completion - another 3 days    Drop vancomcyin soon with above culture.                              .

## 2017-02-07 LAB
ALBUMIN SERPL BCP-MCNC: 2 G/DL
ALP SERPL-CCNC: 110 U/L
ALT SERPL W/O P-5'-P-CCNC: 20 U/L
ANION GAP SERPL CALC-SCNC: 8 MMOL/L
AST SERPL-CCNC: 22 U/L
BACTERIA BLD CULT: NORMAL
BACTERIA BLD CULT: NORMAL
BASOPHILS # BLD AUTO: 0 K/UL
BASOPHILS NFR BLD: 0.3 %
BILIRUB SERPL-MCNC: 0.8 MG/DL
BUN SERPL-MCNC: 18 MG/DL
CALCIUM SERPL-MCNC: 8.3 MG/DL
CHLORIDE SERPL-SCNC: 102 MMOL/L
CO2 SERPL-SCNC: 23 MMOL/L
CREAT SERPL-MCNC: 0.7 MG/DL
DIFFERENTIAL METHOD: ABNORMAL
EOSINOPHIL # BLD AUTO: 0.2 K/UL
EOSINOPHIL NFR BLD: 2.3 %
ERYTHROCYTE [DISTWIDTH] IN BLOOD BY AUTOMATED COUNT: 17.9 %
EST. GFR  (AFRICAN AMERICAN): >60 ML/MIN/1.73 M^2
EST. GFR  (NON AFRICAN AMERICAN): >60 ML/MIN/1.73 M^2
GLUCOSE SERPL-MCNC: 100 MG/DL
HCT VFR BLD AUTO: 28.3 %
HGB BLD-MCNC: 9.1 G/DL
LYMPHOCYTES # BLD AUTO: 2.9 K/UL
LYMPHOCYTES NFR BLD: 38.4 %
MCH RBC QN AUTO: 26.3 PG
MCHC RBC AUTO-ENTMCNC: 32.2 %
MCV RBC AUTO: 82 FL
MONOCYTES # BLD AUTO: 0.6 K/UL
MONOCYTES NFR BLD: 8.6 %
NEUTROPHILS # BLD AUTO: 3.8 K/UL
NEUTROPHILS NFR BLD: 50.4 %
PLATELET # BLD AUTO: 234 K/UL
PMV BLD AUTO: 8.9 FL
POTASSIUM SERPL-SCNC: 3.7 MMOL/L
PROT SERPL-MCNC: 7.5 G/DL
RBC # BLD AUTO: 3.47 M/UL
SODIUM SERPL-SCNC: 133 MMOL/L
VANCOMYCIN TROUGH SERPL-MCNC: 18.1 UG/ML
WBC # BLD AUTO: 7.5 K/UL

## 2017-02-07 PROCEDURE — 63600175 PHARM REV CODE 636 W HCPCS: Performed by: INTERNAL MEDICINE

## 2017-02-07 PROCEDURE — 25000003 PHARM REV CODE 250: Performed by: INTERNAL MEDICINE

## 2017-02-07 PROCEDURE — 94770 HC EXHALED C02 TEST: CPT

## 2017-02-07 PROCEDURE — 27000221 HC OXYGEN, UP TO 24 HOURS

## 2017-02-07 PROCEDURE — 80202 ASSAY OF VANCOMYCIN: CPT

## 2017-02-07 PROCEDURE — 94003 VENT MGMT INPAT SUBQ DAY: CPT

## 2017-02-07 PROCEDURE — 63600175 PHARM REV CODE 636 W HCPCS: Performed by: NURSE PRACTITIONER

## 2017-02-07 PROCEDURE — 99232 SBSQ HOSP IP/OBS MODERATE 35: CPT | Mod: ,,, | Performed by: INTERNAL MEDICINE

## 2017-02-07 PROCEDURE — 80053 COMPREHEN METABOLIC PANEL: CPT

## 2017-02-07 PROCEDURE — C9113 INJ PANTOPRAZOLE SODIUM, VIA: HCPCS | Performed by: NURSE PRACTITIONER

## 2017-02-07 PROCEDURE — 20000000 HC ICU ROOM

## 2017-02-07 PROCEDURE — 25000003 PHARM REV CODE 250: Performed by: HOSPITALIST

## 2017-02-07 PROCEDURE — 36415 COLL VENOUS BLD VENIPUNCTURE: CPT

## 2017-02-07 PROCEDURE — 85025 COMPLETE CBC W/AUTO DIFF WBC: CPT

## 2017-02-07 PROCEDURE — 94761 N-INVAS EAR/PLS OXIMETRY MLT: CPT

## 2017-02-07 PROCEDURE — 97803 MED NUTRITION INDIV SUBSEQ: CPT | Performed by: DIETITIAN, REGISTERED

## 2017-02-07 RX ADMIN — CARVEDILOL 12.5 MG: 6.25 TABLET, FILM COATED ORAL at 09:02

## 2017-02-07 RX ADMIN — PANTOPRAZOLE SODIUM 40 MG: 40 INJECTION, POWDER, FOR SOLUTION INTRAVENOUS at 09:02

## 2017-02-07 RX ADMIN — CARVEDILOL 12.5 MG: 6.25 TABLET, FILM COATED ORAL at 10:02

## 2017-02-07 RX ADMIN — CHLORHEXIDINE GLUCONATE 15 ML: 1.2 RINSE ORAL at 10:02

## 2017-02-07 RX ADMIN — VANCOMYCIN HYDROCHLORIDE 750 MG: 750 INJECTION, POWDER, LYOPHILIZED, FOR SOLUTION INTRAVENOUS at 01:02

## 2017-02-07 RX ADMIN — GLYCOPYRROLATE 1 MG: 1 TABLET ORAL at 05:02

## 2017-02-07 RX ADMIN — ENOXAPARIN SODIUM 40 MG: 100 INJECTION SUBCUTANEOUS at 12:02

## 2017-02-07 RX ADMIN — GLYCOPYRROLATE 1 MG: 1 TABLET ORAL at 02:02

## 2017-02-07 RX ADMIN — MEROPENEM AND SODIUM CHLORIDE 1 G: 1 INJECTION, SOLUTION INTRAVENOUS at 04:02

## 2017-02-07 RX ADMIN — VANCOMYCIN HYDROCHLORIDE 750 MG: 750 INJECTION, POWDER, LYOPHILIZED, FOR SOLUTION INTRAVENOUS at 02:02

## 2017-02-07 RX ADMIN — MINERAL SUPPLEMENT IRON 300 MG / 5 ML STRENGTH LIQUID 100 PER BOX UNFLAVORED 300 MG: at 10:02

## 2017-02-07 RX ADMIN — CHLORHEXIDINE GLUCONATE 15 ML: 1.2 RINSE ORAL at 09:02

## 2017-02-07 RX ADMIN — LACTOBACILLUS TAB 1 TABLET: TAB at 12:02

## 2017-02-07 RX ADMIN — LACTOBACILLUS TAB 1 TABLET: TAB at 05:02

## 2017-02-07 RX ADMIN — LEVETIRACETAM 500 MG: 500 SOLUTION ORAL at 10:02

## 2017-02-07 RX ADMIN — MINERAL SUPPLEMENT IRON 300 MG / 5 ML STRENGTH LIQUID 100 PER BOX UNFLAVORED 300 MG: at 09:02

## 2017-02-07 RX ADMIN — LEVETIRACETAM 500 MG: 500 SOLUTION ORAL at 09:02

## 2017-02-07 RX ADMIN — GLYCOPYRROLATE 1 MG: 1 TABLET ORAL at 10:02

## 2017-02-07 RX ADMIN — MEROPENEM AND SODIUM CHLORIDE 1 G: 1 INJECTION, SOLUTION INTRAVENOUS at 05:02

## 2017-02-07 RX ADMIN — POLYETHYLENE GLYCOL (3350) 17 G: 17 POWDER, FOR SOLUTION ORAL at 09:02

## 2017-02-07 RX ADMIN — LACTOBACILLUS TAB 1 TABLET: TAB at 08:02

## 2017-02-07 RX ADMIN — ROSUVASTATIN CALCIUM 5 MG: 5 TABLET, FILM COATED ORAL at 09:02

## 2017-02-07 NOTE — PROGRESS NOTES
Attempted to speak to pt's family to complete his assessment.  Called pt's sister Ana, [phone].  Phone would ring then disconnect.  Left message on son Emilie's phone [phone] requesting he call back.     11:55 a.m. - spoke with Brittney at [facility], [phone] who verified that two numbers we had for family as correct.

## 2017-02-07 NOTE — PLAN OF CARE
Problem: Patient Care Overview  Goal: Plan of Care Review  Outcome: Ongoing (interventions implemented as appropriate)  Patient spiked temp last night so patient's discharged back to [facility] held today. Patient remained afebrile so far this shift. Patient on scheduled IV antibiotics.  Blood cultures in process.  Patient remains free of falls and injury since hospital admission. siderails up x3 and bed alarm on and audible. Fall precautions maintained.  Pt has remained free of skin breakdown since hospital admission.  Patient on mechanical ventilation. No episodes of hypoxemia this shift. Patient suctioned as needed and oral care given Q2 and prn.  To try and send patient back to [facility] tomorrow if patient remains stable.

## 2017-02-07 NOTE — PLAN OF CARE
02/06/17 1920   PRE-TX-O2-ETCO2   Oxygen Concentration (%) 30   SpO2 97 %   ETCO2 (mmHg) 29 mmHg   Pulse 75   Resp (!) 25   Vent Select   Conventional Vent Y   Preset Conventional Ventilator Settings   Vent Type    Ventilation Type VC   Vent Mode A/C   Set Rate 22 bmp   Vt Set 500 mL   PEEP/CPAP 5 cmH20   Pressure Support 0 cmH20   Waveform RAMP   Peak Flow 55 L/min   Set Inspiratory Pressure 0 cmH20   Insp Time 0 Sec(s)   Plateau Set/Insp. Hold (sec) 0   Insp Rise Time  0 %   Trigger Sensitivity Flow/I-Trigger 1.5 L/min   P High 0 cm H2O   P Low 0 cm H2O   T High 0 sec   T Low 0 sec   Patient Ventilator Parameters   Resp Rate Total 46 br/min   Peak Airway Pressure 14 cmH2O   Mean Airway Pressure 14 cmH20   Exhaled Vt 325 mL   Total Ve 4.07 mL   Spont Ve 0 L   I:E Ratio Measured 1.20:1   Conventional Ventilator Alarms   Ve High Alarm 24 L/min   Resp Rate High Alarm 50 br/min   Press High Alarm 50 cmH2O   Apnea Rate 10   Apnea Volume (mL) 500 mL   Apnea Oxygen Concentration  100   Apnea Flow Rate (L/min) 60   T Apnea 20 sec(s)   Ready to Wean/Extubation Screen   FIO2<60 (chart decimal) 0.3   MV<16L (chart vol.) 4.07   PEEP <10 (chart #) 5   Ready to Wean Parameters   F/VT Ratio<105 (RSBI) (!) 76.92

## 2017-02-07 NOTE — PROGRESS NOTES
[facility]  Adult Nutrition  Progress Note    SUMMARY     Recommendations  1.) Continue with  Diabetasource AC @ 20 mls/hr increasing by 10 mls/hr Q4 hrs or as patient tolerates to goal rate 65 mls/hr continuous providing 1872 kcals/day, 94 g protein/day, 156 g CHO/day, and 1276 mls water/day. Needs assessed per [place]: 1803 kcals/day. Hold TF x4 hrs for residuals >250mls. Flush 90 mls Q4 hrs to meet fluid needs or per MD.   Goals: 1.) patient will recieve nutrition within 48 hrs. 2.) patient will tolerate feeds at goal rate within 72 hrs.   Nutrition Goal Status: 1.) goal met 2.) new  Communication of RD Recs: other (comment) (second sign to MD)    1. Sepsis, due to unspecified organism    2. Pneumonia    3. HCAP (healthcare-associated pneumonia)    4. Acute on chronic respiratory failure with hypercapnia    5. Anoxic brain injury    6. Pneumonia of both lower lobes due to infectious organism      Past Medical History   Diagnosis Date    Anoxic brain injury 7/25/2015     during cardiac arrest    Chronic kidney disease     Decubitus ulcer of ankle, unstageable      outer right ankle    Decubitus ulcer of sacral region, stage 3     Decubitus ulcer, unstageable      left ear upper with blister to lower ear lobe    Diabetes mellitus      type II    Dissecting aneurysm of thoracoabdominal aorta 7/25/2015    Encounter for blood transfusion     H/O renal calculi     History of ATN      r/t hypoperfusion    Hyperlipidemia     Hypertension     Seizures     Severe anemia     Stroke 7/25/2015     bilateral watershed infarcts    Ventilator dependent        Reason for Assessment  Reason for Assessment: RD follow-up  Diagnosis:  (admits with trever from [facility])  Interdisciplinary Rounds: attended  General Comments: Remains intubated on ICU. Patient may return to [facility] today. Tolerating TFs so far per RN.       Nutrition Prescription Ordered  Current Diet Order: NPO     Current  Nutrition [facility] Or[facility]:  [facility]  Current Nutrition Support Rate Ordered: 20 (ml)  Current Nutrition Support Frequency Ordered: continuous        Evaluation of Received Nutrients/Fluid Intake  Enteral Calories (kcal): 576  Enteral Protein (gm): 29  Enteral (Free Water) Fluid (mL): 393     Nutrition Risk Screen  Nutrition Risk Screen: tube feeding or parenteral nutrition    Nutrition/Diet History  Typical Food/Fluid Intake:  (intubated)  Factors Affecting Nutritional Intake: on mechanical ventilation    Labs/Tests/Procedures/Meds  Diagnostic Test/Procedure Review: reviewed  Pertinent Labs Reviewed: reviewed, pertinent  BMP  Lab Results   Component Value Date     (L) 02/07/2017    K 3.7 02/07/2017     02/07/2017    CO2 23 02/07/2017    BUN 18 02/07/2017    CREATININE 0.7 02/07/2017    CALCIUM 8.3 (L) 02/07/2017    ANIONGAP 8 02/07/2017    ESTGFRAFRICA >60 02/07/2017    EGFRNONAA >60 02/07/2017     Lab Results   Component Value Date    ALBUMIN 2.0 (L) 02/07/2017     Lab Results   Component Value Date    CALCIUM 8.3 (L) 02/07/2017    PHOS 2.6 (L) 02/05/2017     No results for input(s): POCTGLUCOSE in the last 24 hours.    Pertinent Medications Reviewed: reviewed  Scheduled Meds:   carvedilol  12.5 mg Per J Tube BID    chlorhexidine  15 mL Mouth/Throat BID    enoxaparin  40 mg Subcutaneous Daily    ferrous sulfate  300 mg Per J Tube BID    glycopyrrolate  1 mg Per J Tube TID    Lactobacillus acidoph-L.bulgar  1 tablet Oral TID WM    levetiracetam oral soln  500 mg Per J Tube BID    meropenem (MERREM) IVPB  1 g Intravenous Q12H    pantoprazole  40 mg Intravenous Daily    polyethylene glycol  17 g Per J Tube Daily    rosuvastatin  5 mg Per J Tube Daily    vancomycin (VANCOCIN) IVPB  750 mg Intravenous Q12H     Continuous Infusions:       Physical Findings  Overall Physical Appearance: on ventilator support  Tubes: gastrostomy tube, jejunostomy tube  Skin: pressure ulcer(s)  (ilya score 12)    Anthropometrics  Height (inches): 69.02 in  Weight Method: Bed Scale  Weight (kg): 70.3 kg  Ideal Body Weight (IBW), Male: 160.12 lb  % Ideal Body Weight, Male (lb): 96.79 lb  BMI (kg/m2): 22.88  BMI Grade: 18.5-24.9 - normal      Estimated/Assessed Needs  Weight Used For Calorie Calculations: 70.3 kg (154 lb 15.7 oz)   Height (cm): 175.3 cm  Energy Need Method: [place] (1803)  RMR (Glenn-St. Jeor Equation): 1483.12  Weight Used For Protein Calculations: 70.3 kg (154 lb 15.7 oz)  1.2 gm Protein (gm): 84.54 and 2.0 gm Protein (gm): 140.89  Fluid Need Method: RDA Method (or per MD)   Grams of CHO per day: 225 g max      Monitor and Evaluation  Food and Nutrient Intake: energy intake, enteral nutrition intake  Food and Nutrient Adminstration: diet order, enteral and parenteral nutrition administration   Anthropometric Measurements: weight, weight change, body mass index  Biochemical Data, Medical Tests and Procedures: electrolyte and renal panel, glucose/endocrine profile, lipid profile  Nutrition-Focused Physical Findings: overall appearance    Nutrition Risk  Level of Risk:  (x2 weekly)    Nutrition Follow-Up  RD Follow-up?: Yes    Assessment and Plan    Protein-calorie malnutrition, moderate  Nutrition Problem:   Inadequate energy intake    Etiology/Related to:   Decreased ability to consume sufficient energy    Signs/Symptoms: NPO status    Treatment Strategy:  Continue with  Diabetasource AC @ 20 mls/hr increasing by 10 mls/hr Q4 hrs or as patient tolerates to goal rate 65 mls/hr continuous providing 1872 kcals/day, 94 g protein/day, 156 g CHO/day, and 1276 mls water/day. Needs assessed per [facility]: 1803 kcals/day. Hold TF x4 hrs for residuals >250mls. Flush 90 mls Q4 hrs to meet fluid needs or per MD.     Nutrition Diagnosis Status:   Improving

## 2017-02-07 NOTE — CONSULTS
Emilie Proctor Sr. [id number] is a 66 y.o. male who has been consulted for vancomycin dosing.    The patient has the following labs:     Date Creatinine (mg/dl)    BUN WBC Count   2/7/2017 Estimated Creatinine Clearance: 104.6 mL/min (based on Cr of 0.7). Lab Results   Component Value Date    BUN 17 02/06/2017     Lab Results   Component Value Date    WBC 8.20 02/06/2017        Current weight is 70.3 kg (154 lb 15.7 oz)    Pt is receiving vancomycin 750 mg every 12 hours.  Vancomycin trough from 2/7/2017  at 0108  was 18.1 mg/dL.  Target trough range is 15-20 mg/dL.   Trough was drawn on time and anticipate it is  Therapeutic. Pharmacy will continue current dose.   The vancomycin trough has been ordered for 2/9/17 at 0100.     Patient will be followed by pharmacy for changes in renal function, toxicity, and efficacy.    Thank you for allowing us to participate in this patient's care.     Jimmie Jacques, Pharmacist

## 2017-02-07 NOTE — PLAN OF CARE
Problem: Nutrition, Enteral (Adult)  Goal: Signs and Symptoms of Listed Potential Problems Will be Absent, Minimized or Managed (Nutrition, Enteral)  Signs and symptoms of listed potential problems will be absent, minimized or managed by discharge/transition of care (reference Nutrition, Enteral (Adult) CPG).   Recommendations  1.) Continue with  Diabetasource AC @ 20 mls/hr increasing by 10 mls/hr Q4 hrs or as patient tolerates to goal rate 65 mls/hr continuous providing 1872 kcals/day, 94 g protein/day, 156 g CHO/day, and 1276 mls water/day. Needs assessed per [place]: 1803 kcals/day. Hold TF x4 hrs for residuals >250mls. Flush 90 mls Q4 hrs to meet fluid needs or per MD.   Goals: 1.) patient will recieve nutrition within 48 hrs. 2.) patient will tolerate feeds at goal rate within 72 hrs.   Nutrition Goal Status: 1.) goal met 2.) new  Communication of RD Recs: other (comment) (second sign to MD)

## 2017-02-07 NOTE — ASSESSMENT & PLAN NOTE
Nutrition Problem:   Inadequate energy intake    Etiology/Related to:   Decreased ability to consume sufficient energy    Signs/Symptoms: NPO status    Treatment Strategy:  Continue with  Diabetasource AC @ 20 mls/hr increasing by 10 mls/hr Q4 hrs or as patient tolerates to goal rate 65 mls/hr continuous providing 1872 kcals/day, 94 g protein/day, 156 g CHO/day, and 1276 mls water/day. Needs assessed per [place]: 1803 kcals/day. Hold TF x4 hrs for residuals >250mls. Flush 90 mls Q4 hrs to meet fluid needs or per MD.     Nutrition Diagnosis Status:   Improving

## 2017-02-07 NOTE — PLAN OF CARE
Problem: Patient Care Overview  Goal: Plan of Care Review  Outcome: Ongoing (interventions implemented as appropriate)  Temp max 100.2 orally. Remains on IV antibiotics. Vanc trough done this shift. Tolerating tube feeds per J tube with G tube to gravity. For possible transfer back to [facility] today

## 2017-02-07 NOTE — PROGRESS NOTES
"Progress Note  Hospital Medicine  Patient Name:Emilie Proctor Sr.  MRN:  [id number]  Patient Class: IP- Inpatient  Admit Date: 2/2/2017  Length of Stay: 5 days  Expected Discharge Date:   Attending Physician: Socorro Chicas MD  Primary Care Provider:  Marcial Avila MD    SUBJECTIVE:     Principal Problem: Pneumonia due to Escherichia coli  Initial history of present illness: Patient is a 66 y.o. male admitted to Hospitalist Service from [facility] with complaint of "Pneumonia". Patient has PMH significant for anoxic brain injury, hypertension, seizures, hyperlipidemia, chronic respiratory failure, history of alcohol and drug abuse, history of CVA and DM-2. EMS reported patient had worsening chest XRs which showed bilateral pneumonia. Patient has a temperature of 100.8 degrees F. Patient was sent over for further evaluation of possible aspiration. Patient not able to communicate. Looks ill and exhausted.    PMH/PSH/SH/FH/Meds: reviewed.    Symptoms/Review of Systems: Tmax 100.2F, fever pattern improving. No distress.  Diet:  PEG/J-tube    Activity level: Bed bound  Pain:  No acute distress    OBJECTIVE:   Vital Signs (Most Recent):      Temp: 99.4 °F (37.4 °C) (02/07/17 0755)  Pulse: 70 (02/07/17 0819)  Resp: (!) 22 (02/07/17 0819)  BP: 120/76 (02/07/17 0755)  SpO2: 100 % (02/07/17 0819)       Vital Signs Range (Last 24H):  Temp:  [98.4 °F (36.9 °C)-100.2 °F (37.9 °C)]   Pulse:  [61-88]   Resp:  [10-33]   BP: ()/(55-88)   SpO2:  [97 %-100 %]     Weight: 70.3 kg (154 lb 15.7 oz)  Body mass index is 22.89 kg/(m^2).    Intake/Output Summary (Last 24 hours) at 02/07/17 0851  Last data filed at 02/07/17 0755   Gross per 24 hour   Intake             2427 ml   Output             1631 ml   Net              796 ml     Physical Examination:  General appearance: well developed, appears stated age, chronically ill appearing  Head: normocephalic, atraumatic  Eyes: conjunctivae/corneas clear. PERRL.  Nose: " Nares normal. Septum midline.  Throat: lips, mucosa, and tongue normal; teeth and gums normal, no throat erythema.  Neck: supple, symmetrical, trachea midline, no JVD and thyroid not enlarged, symmetric, no tenderness/mass/nodules. Trach noted.  Lungs: clear to auscultation bilaterally and normal respiratory effort  Chest wall: no tenderness  Heart: regular rate and rhythm, S1, S2 normal, no murmur, click, rub or gallop  Abdomen: soft, non-tender non-distented; bowel sounds normal; no masses, no organomegaly. PEG and J tube site with scant discharge  Extremities: no cyanosis, clubbing or edema.   Pulses: 2+ and symmetric  Skin: Skin color, texture, turgor normal. Stage 2 sacral decubitus (refer to nursing assessment)  Lymph nodes: Cervical, supraclavicular, and axillary nodes normal.  Neurologic: Anoxic brain injury. Non-interactive.    CBC:    Recent Labs  Lab 02/05/17  0327 02/06/17  0349 02/07/17  0322   WBC 7.30 8.20 7.50   RBC 3.55* 3.35* 3.47*   HGB 9.2* 8.8* 9.1*   HCT 29.4* 27.2* 28.3*    233 234   MCV 83 81* 82   MCH 26.0* 26.2* 26.3*   MCHC 31.4* 32.3 32.2   BMP    Recent Labs  Lab 02/04/17  0353 02/05/17  0327 02/06/17  0348 02/07/17  0322   * 101 109 100    131* 132* 133*   K 3.4* 4.1 3.9 3.7    103 102 102   CO2 23 21* 23 23   BUN 25* 16 17 18   CREATININE 0.7 0.6 0.7 0.7   CALCIUM 8.2* 8.4* 8.1* 8.3*   MG 1.7 1.6  --   --       Diagnostic Results:  Microbiology Results (last 7 days)     Procedure Component Value Units Date/Time    Blood culture [[id number]] Collected:  02/03/17 1830    Order Status:  Completed Specimen:  Blood Updated:  02/07/17 0612     Blood Culture, Routine No Growth to date     Blood Culture, Routine No Growth to date     Blood Culture, Routine No Growth to date     Blood Culture, Routine No Growth to date    Blood culture #1 **CANNOT BE ORDERED STAT** [[id number]] Collected:  02/02/17 1613    Order Status:  Completed Specimen:  Blood Updated:  02/06/17 8742      Blood Culture, Routine No Growth to date     Blood Culture, Routine No Growth to date     Blood Culture, Routine No Growth to date     Blood Culture, Routine No Growth to date     Blood Culture, Routine No Growth to date    Blood culture #2 **CANNOT BE ORDERED STAT** [[id number]] Collected:  02/02/17 1647    Order Status:  Completed Specimen:  Blood Updated:  02/06/17 2322     Blood Culture, Routine No Growth to date     Blood Culture, Routine No Growth to date     Blood Culture, Routine No Growth to date     Blood Culture, Routine No Growth to date     Blood Culture, Routine No Growth to date    Blood culture [[id number]] Collected:  02/06/17 0628    Order Status:  Completed Specimen:  Blood from Peripheral, Left  Hand Updated:  02/06/17 1745     Blood Culture, Routine No Growth to date    Blood culture [[id number]] Collected:  02/06/17 0601    Order Status:  Completed Specimen:  Blood from Peripheral, Left  Hand Updated:  02/06/17 1745     Blood Culture, Routine No Growth to date    Blood culture [[id number]]     Order Status:  Canceled Specimen:  Blood     Narrative:       Culture Blood was cancelled on 02/06/2017 at 06:02 by [facility]; Duplicate   order    Blood culture [[id number]]     Order Status:  Canceled Specimen:  Blood     Narrative:       Culture Blood was cancelled on 02/06/2017 at 06:02 by [facility]; Duplicate   order    Urine culture **CANNOT BE ORDERED STAT** [[id number]] Collected:  02/02/17 1628    Order Status:  Completed Specimen:  Urine from Urine, Catheterized Updated:  02/04/17 0310     Urine Culture, Routine No growth      Chest X-Ray: 1.  Stable radiographic appearance to the chest.  Persistent bilateral pulmonary opacities are noted for which differential considerations include pneumonia and aspiration in this patient with history of esophageal stent.  Small left pleural effusion noted.  2.  Stable dilatation of the aorta in this patient with known type B thoracic dissection.    CXR: Stable  examination demonstrating patchy bilateral airspace disease likely reflecting pneumonia.    CXR: Stable positioning of support devices. Patchy bilateral infiltrates without significant change.  New small left pleural effusion.    Assessment/Plan:   Sepsis with HCAP  HCAP  Supplemental O2 via nasal canula; titrate O2 saturation to >92%.   Pulmonary consultation.   Continue beta 2 agonist bronchodilator treatments.   Continue IV antibiotics - Merrem and Vancomycin.  Pharmacy to dose Vanco.  Check sputum GS and Cx.   Continue routine medications as before.      Chronic respiratory failure  Continue mechanical ventilation  Continuous oximetry  Nebulized bronchodilator     Hemiplegia as late effect of stroke  Turn and position      Tracheostomy in place  Continue routine tracheostomy care      Functional quadriplegia  Turn and position      Gastrostomy status  Keep g tube open to gravity      Seizure disorder  Continue Levetiracetam, check level.    Likely DC to NH in AM with antibiotics.  VTE Risk Mitigation         Ordered     enoxaparin injection 40 mg  Daily     Route:  Subcutaneous        02/02/17 1947     Low Risk of VTE  Once      02/02/17 1947        Socorro Chicas MD  Department of Hospital Medicine   [facility]

## 2017-02-08 LAB
ALBUMIN SERPL BCP-MCNC: 2 G/DL
ALP SERPL-CCNC: 108 U/L
ALT SERPL W/O P-5'-P-CCNC: 18 U/L
ANION GAP SERPL CALC-SCNC: 6 MMOL/L
AST SERPL-CCNC: 21 U/L
BACTERIA #/AREA URNS HPF: NORMAL /HPF
BASOPHILS # BLD AUTO: 0 K/UL
BASOPHILS NFR BLD: 0.3 %
BILIRUB SERPL-MCNC: 0.7 MG/DL
BILIRUB UR QL STRIP: NEGATIVE
BUN SERPL-MCNC: 19 MG/DL
CALCIUM SERPL-MCNC: 8.3 MG/DL
CHLORIDE SERPL-SCNC: 102 MMOL/L
CLARITY UR: CLEAR
CO2 SERPL-SCNC: 26 MMOL/L
COLOR UR: YELLOW
CREAT SERPL-MCNC: 0.7 MG/DL
DIFFERENTIAL METHOD: ABNORMAL
EOSINOPHIL # BLD AUTO: 0.1 K/UL
EOSINOPHIL NFR BLD: 1.7 %
ERYTHROCYTE [DISTWIDTH] IN BLOOD BY AUTOMATED COUNT: 18.3 %
EST. GFR  (AFRICAN AMERICAN): >60 ML/MIN/1.73 M^2
EST. GFR  (NON AFRICAN AMERICAN): >60 ML/MIN/1.73 M^2
GLUCOSE SERPL-MCNC: 95 MG/DL
GLUCOSE UR QL STRIP: NEGATIVE
HCT VFR BLD AUTO: 27.8 %
HGB BLD-MCNC: 8.8 G/DL
HGB UR QL STRIP: NEGATIVE
HYALINE CASTS #/AREA URNS LPF: 0 /LPF
KETONES UR QL STRIP: NEGATIVE
LEUKOCYTE ESTERASE UR QL STRIP: NEGATIVE
LYMPHOCYTES # BLD AUTO: 3.6 K/UL
LYMPHOCYTES NFR BLD: 43.2 %
MCH RBC QN AUTO: 25.7 PG
MCHC RBC AUTO-ENTMCNC: 31.7 %
MCV RBC AUTO: 81 FL
MICROSCOPIC COMMENT: NORMAL
MONOCYTES # BLD AUTO: 0.9 K/UL
MONOCYTES NFR BLD: 11.4 %
NEUTROPHILS # BLD AUTO: 3.6 K/UL
NEUTROPHILS NFR BLD: 43.4 %
NITRITE UR QL STRIP: NEGATIVE
PH UR STRIP: 8 [PH] (ref 5–8)
PLATELET # BLD AUTO: 254 K/UL
PMV BLD AUTO: 8.9 FL
POTASSIUM SERPL-SCNC: 4 MMOL/L
PROT SERPL-MCNC: 7.5 G/DL
PROT UR QL STRIP: ABNORMAL
RBC # BLD AUTO: 3.43 M/UL
RBC #/AREA URNS HPF: 2 /HPF (ref 0–4)
SODIUM SERPL-SCNC: 134 MMOL/L
SP GR UR STRIP: 1.01 (ref 1–1.03)
URN SPEC COLLECT METH UR: ABNORMAL
UROBILINOGEN UR STRIP-ACNC: ABNORMAL EU/DL
WBC # BLD AUTO: 8.3 K/UL
WBC #/AREA URNS HPF: 1 /HPF (ref 0–5)

## 2017-02-08 PROCEDURE — 36415 COLL VENOUS BLD VENIPUNCTURE: CPT

## 2017-02-08 PROCEDURE — 94003 VENT MGMT INPAT SUBQ DAY: CPT

## 2017-02-08 PROCEDURE — 27000221 HC OXYGEN, UP TO 24 HOURS

## 2017-02-08 PROCEDURE — C9113 INJ PANTOPRAZOLE SODIUM, VIA: HCPCS | Performed by: NURSE PRACTITIONER

## 2017-02-08 PROCEDURE — 63600175 PHARM REV CODE 636 W HCPCS: Performed by: INTERNAL MEDICINE

## 2017-02-08 PROCEDURE — 99233 SBSQ HOSP IP/OBS HIGH 50: CPT | Mod: ,,, | Performed by: INTERNAL MEDICINE

## 2017-02-08 PROCEDURE — 63600175 PHARM REV CODE 636 W HCPCS: Performed by: NURSE PRACTITIONER

## 2017-02-08 PROCEDURE — 94761 N-INVAS EAR/PLS OXIMETRY MLT: CPT

## 2017-02-08 PROCEDURE — 85025 COMPLETE CBC W/AUTO DIFF WBC: CPT

## 2017-02-08 PROCEDURE — 99900026 HC AIRWAY MAINTENANCE (STAT)

## 2017-02-08 PROCEDURE — 25000003 PHARM REV CODE 250: Performed by: INTERNAL MEDICINE

## 2017-02-08 PROCEDURE — 99231 SBSQ HOSP IP/OBS SF/LOW 25: CPT | Mod: ,,, | Performed by: INTERNAL MEDICINE

## 2017-02-08 PROCEDURE — 25000003 PHARM REV CODE 250: Performed by: NURSE PRACTITIONER

## 2017-02-08 PROCEDURE — 81000 URINALYSIS NONAUTO W/SCOPE: CPT

## 2017-02-08 PROCEDURE — 87086 URINE CULTURE/COLONY COUNT: CPT

## 2017-02-08 PROCEDURE — 25000003 PHARM REV CODE 250: Performed by: HOSPITALIST

## 2017-02-08 PROCEDURE — 80053 COMPREHEN METABOLIC PANEL: CPT

## 2017-02-08 PROCEDURE — 20000000 HC ICU ROOM

## 2017-02-08 PROCEDURE — 94770 HC EXHALED C02 TEST: CPT

## 2017-02-08 RX ADMIN — PANTOPRAZOLE SODIUM 40 MG: 40 INJECTION, POWDER, FOR SOLUTION INTRAVENOUS at 09:02

## 2017-02-08 RX ADMIN — CARVEDILOL 12.5 MG: 6.25 TABLET, FILM COATED ORAL at 09:02

## 2017-02-08 RX ADMIN — MEROPENEM AND SODIUM CHLORIDE 1 G: 1 INJECTION, SOLUTION INTRAVENOUS at 05:02

## 2017-02-08 RX ADMIN — LACTOBACILLUS TAB 1 TABLET: TAB at 07:02

## 2017-02-08 RX ADMIN — ROSUVASTATIN CALCIUM 5 MG: 5 TABLET, FILM COATED ORAL at 09:02

## 2017-02-08 RX ADMIN — ACETAMINOPHEN 650 MG: 160 SOLUTION ORAL at 01:02

## 2017-02-08 RX ADMIN — LACTOBACILLUS TAB 1 TABLET: TAB at 05:02

## 2017-02-08 RX ADMIN — GLYCOPYRROLATE 1 MG: 1 TABLET ORAL at 01:02

## 2017-02-08 RX ADMIN — MINERAL SUPPLEMENT IRON 300 MG / 5 ML STRENGTH LIQUID 100 PER BOX UNFLAVORED 300 MG: at 09:02

## 2017-02-08 RX ADMIN — LACTOBACILLUS TAB 1 TABLET: TAB at 11:02

## 2017-02-08 RX ADMIN — ENOXAPARIN SODIUM 40 MG: 100 INJECTION SUBCUTANEOUS at 11:02

## 2017-02-08 RX ADMIN — LEVETIRACETAM 500 MG: 500 SOLUTION ORAL at 09:02

## 2017-02-08 RX ADMIN — CHLORHEXIDINE GLUCONATE 15 ML: 1.2 RINSE ORAL at 09:02

## 2017-02-08 RX ADMIN — ACETAMINOPHEN 650 MG: 160 SOLUTION ORAL at 11:02

## 2017-02-08 RX ADMIN — POLYETHYLENE GLYCOL (3350) 17 G: 17 POWDER, FOR SOLUTION ORAL at 09:02

## 2017-02-08 RX ADMIN — GLYCOPYRROLATE 1 MG: 1 TABLET ORAL at 06:02

## 2017-02-08 RX ADMIN — GLYCOPYRROLATE 1 MG: 1 TABLET ORAL at 10:02

## 2017-02-08 NOTE — PROGRESS NOTES
Progress Note  PULMONARY    Admit Date: 2/2/2017 2/8/2017        SUBJECTIVE:     Feb 4, actually animated and following commands today.  No c/o or distress  Feb 5, not arousing much this am, no distress apparent  Feb 6, alert and seems to interact. No distress  Feb 7, stable bedside and no c/o  Feb 8, alert again today, no distress        PFSH and Allergies reviewed.  Pt's neuro status prevents obtaining ros.    OBJECTIVE:     Vitals (Most recent):  Vitals:    02/08/17 1714   BP:    Pulse: 64   Resp: (!) 22   Temp:        Vitals (24 hour range):  Temp:  [98.8 °F (37.1 °C)-102 °F (38.9 °C)]   Pulse:  [64-85]   Resp:  [0-34]   BP: ()/(55-79)   SpO2:  [96 %-100 %]       Intake/Output Summary (Last 24 hours) at 02/08/17 1736  Last data filed at 02/08/17 1550   Gross per 24 hour   Intake           1492.5 ml   Output             1795 ml   Net           -302.5 ml          Physical Exam:  The patient's neuro status (alertness,orientation,cognitive function,motor skills,), pharyngeal exam (oral lesions, hygiene, abn dentition,), Neck (jvd,mass,thyroid,nodes in neck and above/below clavicle),RESPIRATORY(symmetry,effort,fremitus,percussion,auscultation),  Cor(rhythm,heart tones including gallops,perfusion,edema)ABD(distention,hepatic&splenomegaly,tenderness,masses), Skin(rash,cyanosis),Psyc(affect,judgement,).  Exam negative except for these pertinent findings:    Trach, secretions controlled, no ecxess trach secretions, no edema, good bs.  Stable exam 2/8/2017      Diagnostic Results:  All labs last 4 entries of CBC,CMP/BMP,AGB, MICRO reviewed.  All rediographs of chest, lungs, & abd reviewed by direct vision last 3 days.     Results for REUBEN JUANITOSRIRAM MCGUIRE SR. (MRN [id number]) as of 2/8/2017 17:34   Ref. Range 2/6/2017 03:49 2/7/2017 03:22 2/8/2017 03:23   WBC Latest Ref Range: 3.90 - 12.70 K/uL 8.20 7.50 8.30   RBC Latest Ref Range: 4.60 - 6.20 M/uL 3.35 (L) 3.47 (L) 3.43 (L)   Hemoglobin Latest Ref Range: 14.0 -  18.0 g/dL 8.8 (L) 9.1 (L) 8.8 (L)       Susceptibility       Serratia marcescens Escherichia coli esbl       CULTURE, RESPIRATORY CULTURE, RESPIRATORY       Amikacin <=16  Sensitive <=16  Sensitive       Amox/K Clav'ate  >16/8  Resistant       Amp/Sulbactam  >16/8  Resistant       Ampicillin  >16  Resistant       Cefazolin  >16  Resistant       Cefepime <=8  Sensitive >16  Resistant       Ceftriaxone <=8  Sensitive >32  Resistant       Ciprofloxacin >2  Resistant >2  Resistant       Ertapenem <=2  Sensitive >4  Resistant       Gentamicin <=4  Sensitive <=4  Sensitive       Meropenem <=4  Sensitive <=4  Sensitive       Piperacillin/Tazo <=16  Sensitive <=16  Sensitive       Tetracycline  >8  Resistant       Tobramycin >8  Resistant 8  Intermediate       Trimeth/Sulfa >2/38  Resistant >2/38  Resistant                      Specimen Collected: 02/02/17 10:28 AM Last Resulted: 02/06/17 12:57 PM Lab Flowsheet Order Details View             ASSESSMENT/PLAN:     Patient Active Problem List   Diagnosis    Aortic dissection distal to left subclavian    Hemiplegia as late effect of stroke    Tracheostomy in place    Functional quadriplegia    Jejunostomy tube in situ    Tracheo-esophageal fistula    Anemia, chronic disease    Pneumonia due to Escherichia coli    Acute on chronic respiratory failure with hypercapnia    Gastrostomy status    Protein-calorie malnutrition, moderate    Seizure disorder    History of CVA (cerebrovascular accident)    Anoxic brain injury    Aspiration into airway    Abnormal CXR    Malfunctioning jejunostomy tube    Acute cystitis with hematuria    Chronic respiratory failure    Transaminitis    Sepsis    Pneumonia of both lower lobes due to infectious organism     Specialty Problems        Pulmonary Problems    Tracheostomy in place        Tracheo-esophageal fistula        * (Principal)Pneumonia due to Escherichia coli        Acute on chronic respiratory failure with  hypercapnia        Aspiration into airway        Chronic respiratory failure        Pneumonia due to infectious organism                  rec     Feb 6, looks good, alert, sourse fever not clear but wbc good,  cxr na- if cxr stable could go to [facility] for abx completion - another 3 days    Drop vancomcyin soon with above culture.    Feb 7, remains stable with low grade temp, wbc better and abx appropriate  Will stop vanc.    Feb8 , temp back up with nl wbc,  Pt has ongoing aspiration with t e fistula palliated by stent.  Prolonged abx will not change fistula.      Would defer adjustment rx to ID, Dr Parra.  Would not manipulate trach for fear of fistula problems.  Trial anti pyretics?                     .

## 2017-02-08 NOTE — PLAN OF CARE
Problem: Patient Care Overview  Goal: Plan of Care Review  Outcome: Ongoing (interventions implemented as appropriate)  Tolerating TF. Slept most of day. No temp spikes. Skin intact. Safety maintained.

## 2017-02-08 NOTE — PROGRESS NOTES
"Progress Note  Hospital Medicine  Patient Name:Emilie Proctor Sr.  MRN:  [id number]  Patient Class: IP- Inpatient  Admit Date: 2/2/2017  Length of Stay: 6 days  Expected Discharge Date:   Attending Physician: Socorro Chicas MD  Primary Care Provider:  Marcial Avila MD    SUBJECTIVE:     Principal Problem: Pneumonia due to Escherichia coli  Initial history of present illness: Patient is a 66 y.o. male admitted to Hospitalist Service from [facility] with complaint of "Pneumonia". Patient has PMH significant for anoxic brain injury, hypertension, seizures, hyperlipidemia, chronic respiratory failure, history of alcohol and drug abuse, history of CVA and DM-2. EMS reported patient had worsening chest XRs which showed bilateral pneumonia. Patient has a temperature of 100.8 degrees F. Patient was sent over for further evaluation of possible aspiration. Patient not able to communicate. Looks ill and exhausted.    PMH/PSH/SH/FH/Meds: reviewed.    Symptoms/Review of Systems: spiked fever again ~ 102F. No distress.  Diet:  PEG/J-tube    Activity level: Bed bound  Pain:  No acute distress    OBJECTIVE:   Vital Signs (Most Recent):      Temp: 99.1 °F (37.3 °C) (02/08/17 0730)  Pulse: 75 (02/08/17 0917)  Resp: (!) 22 (02/08/17 0814)  BP: 129/69 (02/08/17 0917)  SpO2: 100 % (02/08/17 0814)       Vital Signs Range (Last 24H):  Temp:  [99 °F (37.2 °C)-102 °F (38.9 °C)]   Pulse:  [65-85]   Resp:  [0-33]   BP: ()/(58-79)   SpO2:  [98 %-100 %]     Weight: 70.3 kg (154 lb 15.7 oz)  Body mass index is 22.89 kg/(m^2).    Intake/Output Summary (Last 24 hours) at 02/08/17 1114  Last data filed at 02/08/17 0928   Gross per 24 hour   Intake           1572.5 ml   Output             1720 ml   Net           -147.5 ml     Physical Examination:  General appearance: well developed, appears stated age, chronically ill appearing  Head: normocephalic, atraumatic  Eyes: conjunctivae/corneas clear. PERRL.  Nose: Nares normal. " Septum midline.  Throat: lips, mucosa, and tongue normal; teeth and gums normal, no throat erythema.  Neck: supple, symmetrical, trachea midline, no JVD and thyroid not enlarged, symmetric, no tenderness/mass/nodules. Trach noted.  Lungs: clear to auscultation bilaterally and normal respiratory effort  Chest wall: no tenderness  Heart: regular rate and rhythm, S1, S2 normal, no murmur, click, rub or gallop  Abdomen: soft, non-tender non-distented; bowel sounds normal; no masses, no organomegaly. PEG and J tube site with scant discharge  Extremities: no cyanosis, clubbing or edema.   Pulses: 2+ and symmetric  Skin: Skin color, texture, turgor normal. Stage 2 sacral decubitus (refer to nursing assessment)  Lymph nodes: Cervical, supraclavicular, and axillary nodes normal.  Neurologic: Anoxic brain injury. Non-interactive.    CBC:    Recent Labs  Lab 02/06/17  0349 02/07/17  0322 02/08/17  0323   WBC 8.20 7.50 8.30   RBC 3.35* 3.47* 3.43*   HGB 8.8* 9.1* 8.8*   HCT 27.2* 28.3* 27.8*    234 254   MCV 81* 82 81*   MCH 26.2* 26.3* 25.7*   MCHC 32.3 32.2 31.7*   BMP    Recent Labs  Lab 02/04/17  0353 02/05/17  0327 02/06/17  0348 02/07/17  0322 02/08/17  0323   * 101 109 100 95    131* 132* 133* 134*   K 3.4* 4.1 3.9 3.7 4.0    103 102 102 102   CO2 23 21* 23 23 26   BUN 25* 16 17 18 19   CREATININE 0.7 0.6 0.7 0.7 0.7   CALCIUM 8.2* 8.4* 8.1* 8.3* 8.3*   MG 1.7 1.6  --   --   --       Diagnostic Results:  Microbiology Results (last 7 days)     Procedure Component Value Units Date/Time    Blood culture [[id number]] Collected:  02/03/17 1830    Order Status:  Completed Specimen:  Blood Updated:  02/08/17 0612     Blood Culture, Routine No Growth to date     Blood Culture, Routine No Growth to date     Blood Culture, Routine No Growth to date     Blood Culture, Routine No Growth to date     Blood Culture, Routine No Growth to date    Blood culture #1 **CANNOT BE ORDERED STAT** [[id number]] Collected:   02/02/17 1613    Order Status:  Completed Specimen:  Blood Updated:  02/07/17 2322     Blood Culture, Routine No growth after 5 days.    Blood culture #2 **CANNOT BE ORDERED STAT** [[id number]] Collected:  02/02/17 1647    Order Status:  Completed Specimen:  Blood Updated:  02/07/17 2322     Blood Culture, Routine No growth after 5 days.    Blood culture [[id number]] Collected:  02/06/17 0601    Order Status:  Completed Specimen:  Blood from Peripheral, Left  Hand Updated:  02/07/17 1212     Blood Culture, Routine No Growth to date     Blood Culture, Routine No Growth to date    Blood culture [[id number]] Collected:  02/06/17 0628    Order Status:  Completed Specimen:  Blood from Peripheral, Left  Hand Updated:  02/07/17 1212     Blood Culture, Routine No Growth to date     Blood Culture, Routine No Growth to date    Blood culture [[id number]]     Order Status:  Canceled Specimen:  Blood     Narrative:       Culture Blood was cancelled on 02/06/2017 at 06:02 by [facility]; Duplicate   order    Blood culture [[id number]]     Order Status:  Canceled Specimen:  Blood     Narrative:       Culture Blood was cancelled on 02/06/2017 at 06:02 by [facility]; Duplicate   order    Urine culture **CANNOT BE ORDERED STAT** [[id number]] Collected:  02/02/17 1628    Order Status:  Completed Specimen:  Urine from Urine, Catheterized Updated:  02/04/17 0310     Urine Culture, Routine No growth      Chest X-Ray: 1.  Stable radiographic appearance to the chest.  Persistent bilateral pulmonary opacities are noted for which differential considerations include pneumonia and aspiration in this patient with history of esophageal stent.  Small left pleural effusion noted.  2.  Stable dilatation of the aorta in this patient with known type B thoracic dissection.    CXR: Stable examination demonstrating patchy bilateral airspace disease likely reflecting pneumonia.    CXR: Stable positioning of support devices. Patchy bilateral infiltrates without significant  change.  New small left pleural effusion.    Assessment/Plan:   Sepsis with HCAP  HCAP (Serratia Marcens, E. Coli-ESBL)  Supplemental O2 via nasal canula; titrate O2 saturation to >92%. Follow Dr. Noonan's recommendations.  Continue beta 2 agonist bronchodilator treatments.   Continue IV antibiotics - Merrem.   Check sputum GS and Cx.   Continue routine medications as before. Consult ID specialist.     Chronic respiratory failure  Continue mechanical ventilation  Continuous oximetry  Nebulized bronchodilator     Hemiplegia as late effect of stroke  Turn and position      Tracheostomy in place  Continue routine tracheostomy care      Functional quadriplegia  Turn and position      Gastrostomy status  Keep g tube open to gravity      Seizure disorder  Continue Levetiracetam, check level.    VTE Risk Mitigation         Ordered     enoxaparin injection 40 mg  Daily     Route:  Subcutaneous        02/02/17 1947     Low Risk of VTE  Once      02/02/17 1947        Socorro Chicas MD  Department of Hospital Medicine   [facility]

## 2017-02-08 NOTE — PROGRESS NOTES
Progress Note  PULMONARY    Admit Date: 2/2/2017 2/7/2017        SUBJECTIVE:     Feb 4, actually animated and following commands today.  No c/o or distress  Feb 5, not arousing much this am, no distress apparent  Feb 6, alert and seems to interact. No distress  Feb 7, stable bedside and no c/o    PFSH and Allergies reviewed.  Pt's neuro status prevents obtaining ros.    OBJECTIVE:     Vitals (Most recent):  Vitals:    02/07/17 2003   BP:    Pulse: 85   Resp: (!) 33   Temp:        Vitals (24 hour range):  Temp:  [99 °F (37.2 °C)-100.2 °F (37.9 °C)]   Pulse:  [64-88]   Resp:  [10-33]   BP: ()/(56-79)   SpO2:  [97 %-100 %]       Intake/Output Summary (Last 24 hours) at 02/07/17 2006  Last data filed at 02/07/17 1749   Gross per 24 hour   Intake             3055 ml   Output             1496 ml   Net             1559 ml          Physical Exam:  The patient's neuro status (alertness,orientation,cognitive function,motor skills,), pharyngeal exam (oral lesions, hygiene, abn dentition,), Neck (jvd,mass,thyroid,nodes in neck and above/below clavicle),RESPIRATORY(symmetry,effort,fremitus,percussion,auscultation),  Cor(rhythm,heart tones including gallops,perfusion,edema)ABD(distention,hepatic&splenomegaly,tenderness,masses), Skin(rash,cyanosis),Psyc(affect,judgement,).  Exam negative except for these pertinent findings:    Trach, secretions controlled, no ecxess trach secretions, no edema, good bs.  Stable exam 2/7/2017      Diagnostic Results:  All labs last 4 entries of CBC,CMP/BMP,AGB, MICRO reviewed.  All rediographs of chest, lungs, & abd reviewed by direct vision last 3 days.      Results for KENNA ALEXIS SR. (MRN [id number]) as of 2/7/2017 20:06   Ref. Range 2/7/2017 03:22   WBC Latest Ref Range: 3.90 - 12.70 K/uL 7.50   RBC Latest Ref Range: 4.60 - 6.20 M/uL 3.47 (L)   Hemoglobin Latest Ref Range: 14.0 - 18.0 g/dL 9.1 (L)   Hematocrit Latest Ref Range: 40.0 - 54.0 % 28.3 (L)   MCV Latest Ref Range: 82 -  98 fL 82   MCH Latest Ref Range: 27.0 - 31.0 pg 26.3 (L)   MCHC Latest Ref Range: 32.0 - 36.0 % 32.2   RDW Latest Ref Range: 11.5 - 14.5 % 17.9 (H)   Platelets Latest Ref Range: 150 - 350 K/uL 234   MPV Latest Ref Range: 9.2 - 12.9 fL 8.9 (L)   Gran% Latest Ref Range: 38.0 - 73.0 % 50.4   Gran # Latest Ref Range: 1.8 - 7.7 K/uL 3.8   Lymph% Latest Ref Range: 18.0 - 48.0 % 38.4   Lymph # Latest Ref Range: 1.0 - 4.8 K/uL 2.9   Mono% Latest Ref Range: 4.0 - 15.0 % 8.6   Mono # Latest Ref Range: 0.3 - 1.0 K/uL 0.6   Eosinophil% Latest Ref Range: 0.0 - 8.0 % 2.3   Eos # Latest Ref Range: 0.0 - 0.5 K/uL 0.2   Basophil% Latest Ref Range: 0.0 - 1.9 % 0.3   Baso # Latest Ref Range: 0.00 - 0.20 K/uL 0.00   Sodium Latest Ref Range: 136 - 145 mmol/L 133 (L)   Potassium Latest Ref Range: 3.5 - 5.1 mmol/L 3.7   Chloride Latest Ref Range: 95 - 110 mmol/L 102   CO2 Latest Ref Range: 23 - 29 mmol/L 23   Anion Gap Latest Ref Range: 8 - 16 mmol/L 8   BUN, Bld Latest Ref Range: 8 - 23 mg/dL 18   Creatinine Latest Ref Range: 0.5 - 1.4 mg/dL 0.7   eGFR if non African American Latest Ref Range: >60 mL/min/1.73 m^2 >60   eGFR if  Latest Ref Range: >60 mL/min/1.73 m^2 >60   Glucose Latest Ref Range: 70 - 110 mg/dL 100   Calcium Latest Ref Range: 8.7 - 10.5 mg/dL 8.3 (L)   Alkaline Phosphatase Latest Ref Range: 55 - 135 U/L 110   Total Protein Latest Ref Range: 6.0 - 8.4 g/dL 7.5   Albumin Latest Ref Range: 3.5 - 5.2 g/dL 2.0 (L)   Total Bilirubin Latest Ref Range: 0.1 - 1.0 mg/dL 0.8   AST Latest Ref Range: 10 - 40 U/L 22     Susceptibility       Serratia marcescens Escherichia coli esbl       CULTURE, RESPIRATORY CULTURE, RESPIRATORY       Amikacin <=16  Sensitive <=16  Sensitive       Amox/K Clav'ate  >16/8  Resistant       Amp/Sulbactam  >16/8  Resistant       Ampicillin  >16  Resistant       Cefazolin  >16  Resistant       Cefepime <=8  Sensitive >16  Resistant       Ceftriaxone <=8  Sensitive >32  Resistant        Ciprofloxacin >2  Resistant >2  Resistant       Ertapenem <=2  Sensitive >4  Resistant       Gentamicin <=4  Sensitive <=4  Sensitive       Meropenem <=4  Sensitive <=4  Sensitive       Piperacillin/Tazo <=16  Sensitive <=16  Sensitive       Tetracycline  >8  Resistant       Tobramycin >8  Resistant 8  Intermediate       Trimeth/Sulfa >2/38  Resistant >2/38  Resistant                      Specimen Collected: 02/02/17 10:28 AM Last Resulted: 02/06/17 12:57 PM Lab Flowsheet Order Details View             ASSESSMENT/PLAN:     Patient Active Problem List   Diagnosis    Aortic dissection distal to left subclavian    Hemiplegia as late effect of stroke    Tracheostomy in place    Functional quadriplegia    Jejunostomy tube in situ    Tracheo-esophageal fistula    Anemia, chronic disease    Pneumonia due to Escherichia coli    Acute on chronic respiratory failure with hypercapnia    Gastrostomy status    Protein-calorie malnutrition, moderate    Seizure disorder    History of CVA (cerebrovascular accident)    Anoxic brain injury    Aspiration into airway    Abnormal CXR    Malfunctioning jejunostomy tube    Acute cystitis with hematuria    Chronic respiratory failure    Transaminitis    Sepsis    Pneumonia due to infectious organism     Specialty Problems        Pulmonary Problems    Tracheostomy in place        Tracheo-esophageal fistula        * (Principal)Pneumonia due to Escherichia coli        Acute on chronic respiratory failure with hypercapnia        Aspiration into airway        Chronic respiratory failure        Pneumonia due to infectious organism                  rec     Feb 6, looks good, alert, sourse fever not clear but wbc good,  cxr na- if cxr stable could go to [facility] for abx completion - another 3 days    Drop vancomcyin soon with above culture.    Feb 7, remains stable with low grade temp, wbc better and abx appropriate  Will stop vanc.                        .

## 2017-02-08 NOTE — CONSULTS
Consult Note  Infectious Disease    Reason for Consult:  fever    HPI: Emilie Proctor Sr. is a chronically vent dependent  66 y.o. male who had anoxic brain injury after an arrest. He has been admitted frequently, usually for respiratory infections due to TE fistula, or jejunostomy tube malfunction. He is non verbal. He was transmitted to the ED for respiratory difficulties and fever. He has been repeatedly exposed to broad spectrum antibiotics, including in January with merrem and aerosol tobra. His initial CXR showed left infrahilar infiltrate. Sputum grew Proteus ESBL and Serratia both of which were sensitive to merrem. It is pertinent to note however that the Proteus was reported as having resistance to ertapenem. He continues to have daily fever from . Last CXR showed increased interstitial changes in RUL. He is awake and able to cooperate with me. He is not having diarrhea. He has no central line. He has a functional peripheral IV. No breakdown.     Review of patient's allergies indicates:  No Known Allergies  Past Medical History   Diagnosis Date    Anoxic brain injury 7/25/2015     during cardiac arrest    Chronic kidney disease     Decubitus ulcer of ankle, unstageable      outer right ankle    Decubitus ulcer of sacral region, stage 3     Decubitus ulcer, unstageable      left ear upper with blister to lower ear lobe    Diabetes mellitus      type II    Dissecting aneurysm of thoracoabdominal aorta 7/25/2015    Encounter for blood transfusion     H/O renal calculi     History of ATN      r/t hypoperfusion    Hyperlipidemia     Hypertension     Seizures     Severe anemia     Stroke 7/25/2015     bilateral watershed infarcts    Ventilator dependent    Numerous aspiration pneumonias  Tracheoesophageal fistula        Past Surgical History   Procedure Laterality Date    Tracheostomy tube placement      Gastrostomy tube placement      Colonoscopy N/A 5/16/2016     Procedure:  COLONOSCOPY;  Surgeon: Wei Moncada MD;  [facility];  Service: Endoscopy;  Laterality: N/A;    Jejunostomy       Social History     Social History    Marital status:      Spouse name: N/A    Number of children: N/A    Years of education: N/A     Social History Main Topics    Smoking status: Never Smoker    Smokeless tobacco: None    Alcohol use No    Drug use: No    Sexual activity: No     Other Topics Concern    None     Social History Narrative     History reviewed. No pertinent family history.    Pertinent medications noted:     Review of Systems:   Unobtainable. Denies pain.     EXAM & DIAGNOSTICS REVIEWED:   Vitals:     Temp:  [98.8 °F (37.1 °C)-102 °F (38.9 °C)]   Temp: 98.8 °F (37.1 °C) (02/08/17 1550)  Pulse: 64 (02/08/17 1714)  Resp: (!) 22 (02/08/17 1714)  BP: 108/66 (02/08/17 1600)  SpO2: 100 % (02/08/17 1714)    Intake/Output Summary (Last 24 hours) at 02/08/17 1738  Last data filed at 02/08/17 1550   Gross per 24 hour   Intake           1492.5 ml   Output             1795 ml   Net           -302.5 ml       General:  In NAD. Looks better than my last visit with him. Alert and attentive, will open mouth and protrude tongue on command  Eyes:  Anicteric, PERRL, EOMI  ENT:  Mouth w/ pink MMM, no lesions/exudate,fair dentition  Neck:  Trachea midline, supple, no adenopathy appreciated  Lungs: Fine crackles right lung, secretions tan  Heart:  RRR, no gallop/murmur noted  Abd:  soft, NT, ND, normal BS, no masses/organomegaly appreciated. g tube to gravity, feeding through J tube  :  Zelaya draining yellow urine, clear  Musc:  Joints without effusion, swelling,  erythema, synovitis, but has bilateral LE and right UE flexion contractures  Skin:  Generally warm, dry, normal for color. No rashes. No palmar or plantar    lesions. No subungual petechiae, no decubitus ulcers  Wound:   Neuro: Awake, non verbal, can shake/nod, moves left arm  Extrem: No edema, erythema, phlebitis,  cellulitis,   VAD:  Peripheral IV in hand    Lines/Tubes/Drains:    General Labs reviewed:    Recent Labs  Lab 02/08/17  0323   WBC 8.30   RBC 3.43*   HGB 8.8*   HCT 27.8*      MCV 81*   MCH 25.7*   MCHC 31.7*       Recent Labs  Lab 02/08/17  0323   CALCIUM 8.3*   PROT 7.5   *   K 4.0   CO2 26      BUN 19   CREATININE 0.7   ALKPHOS 108   ALT 18   AST 21   BILITOT 0.7           Micro:  Microbiology Results (last 7 days)     Procedure Component Value Units Date/Time    Blood culture [[id number]] Collected:  02/06/17 0601    Order Status:  Completed Specimen:  Blood from Peripheral, Left  Hand Updated:  02/08/17 1212     Blood Culture, Routine No Growth to date     Blood Culture, Routine No Growth to date     Blood Culture, Routine No Growth to date    Blood culture [[id number]] Collected:  02/06/17 0628    Order Status:  Completed Specimen:  Blood from Peripheral, Left  Hand Updated:  02/08/17 1212     Blood Culture, Routine No Growth to date     Blood Culture, Routine No Growth to date     Blood Culture, Routine No Growth to date    Blood culture [[id number]] Collected:  02/03/17 1830    Order Status:  Completed Specimen:  Blood Updated:  02/08/17 0612     Blood Culture, Routine No Growth to date     Blood Culture, Routine No Growth to date     Blood Culture, Routine No Growth to date     Blood Culture, Routine No Growth to date     Blood Culture, Routine No Growth to date    Blood culture #1 **CANNOT BE ORDERED STAT** [[id number]] Collected:  02/02/17 1613    Order Status:  Completed Specimen:  Blood Updated:  02/07/17 2322     Blood Culture, Routine No growth after 5 days.    Blood culture #2 **CANNOT BE ORDERED STAT** [[id number]] Collected:  02/02/17 1647    Order Status:  Completed Specimen:  Blood Updated:  02/07/17 2322     Blood Culture, Routine No growth after 5 days.    Blood culture [[id number]]     Order Status:  Canceled Specimen:  Blood     Narrative:       Culture Blood was cancelled on  02/06/2017 at 06:02 by [facility]; Duplicate   order    Blood culture [[id number]]     Order Status:  Canceled Specimen:  Blood     Narrative:       Culture Blood was cancelled on 02/06/2017 at 06:02 by [facility]; Duplicate   order    Urine culture **CANNOT BE ORDERED STAT** [[id number]] Collected:  02/02/17 1628    Order Status:  Completed Specimen:  Urine from Urine, Catheterized Updated:  02/04/17 0310     Urine Culture, Routine No growth        Imaging Reviewed:   CXR initially left infrahilar infiltrate, later a faint interstitial infiltrate RUL      IMPRESSION & PLAN   1. Persistent fever without deterioration  2. No doubt he is constantly aspirating secretions from TE fistula  3. History of MDRO on every sputum culture obtained in the last year. At some point he will develop an untreatable pneumonia due to antibiotic resistance  4. R/o DVT, no evidence of drug fever, eosinophilia, diarrhea, etc History of Cdiff ag pos 2016     Recommendation:   Continue merrem  Ultrasound both LE for DVT  Repeat CXR  Send Ua and culture

## 2017-02-08 NOTE — CONSULTS
Emilie Proctor Sr. [id number] is a 66 y.o. male who had been consulted for vancomycin dosing.    Vancomycin has been discontinued.  Pharmacy consult for vancomycin dosing in no longer required.      Thank you for allowing us to participate in this patient's care.     Wilfred Velarde ,PharmD

## 2017-02-08 NOTE — PHYSICIAN QUERY
PT Name: Emilie Proctor Sr.  MR #: [id number]     Physician Query Form - Documentation Clarification    Reviewer  Ext [phone] Rebecca Chung RN CDS    This form is a permanent document in the medical record.     Query Date: February 8, 2017  By submitting this query, we are merely seeking further clarification of documentation to reflect the severity of illness of your patient. Please utilize your independent clinical judgment when addressing the question(s) below.    (The Medical record reflects the following:)      Supporting Clinical Findings Location in Medical Record   History of CVA (cerebrovascular accident      Hemiplegia as late effect of stroke H/P 2/2        H/P 2/2                                                                                      Doctor, Please specify which diagnosis or diagnoses associated with above clinical findings.    Physician Use Only      [  ] Hemiplegia of the right side    [  ] Hemiplegia of the left side    [  ] Other (Specify)_______________________                                                                                                             [ x ] Unable to determine

## 2017-02-08 NOTE — PLAN OF CARE
Problem: Patient Care Overview  Goal: Plan of Care Review  Outcome: Ongoing (interventions implemented as appropriate)  Pt on vent as ordered with [facility] trach [facility]

## 2017-02-08 NOTE — PLAN OF CARE
02/07/17 2003   PRE-TX-O2-ETCO2   Oxygen Concentration (%) 30   SpO2 98 %   ETCO2 (mmHg) 29 mmHg   Pulse 85   Resp (!) 33   Vent Select   Conventional Vent Y   Preset Conventional Ventilator Settings   Vent Type    Ventilation Type VC   Vent Mode A/C   Set Rate 22 bmp   Vt Set 500 mL   PEEP/CPAP 5 cmH20   Pressure Support 0 cmH20   Waveform RAMP   Peak Flow 55 L/min   Set Inspiratory Pressure 0 cmH20   Insp Time 0 Sec(s)   Plateau Set/Insp. Hold (sec) 0   Insp Rise Time  0 %   Trigger Sensitivity Flow/I-Trigger 1.5 L/min   P High 0 cm H2O   P Low 0 cm H2O   T High 0 sec   T Low 0 sec   Patient Ventilator Parameters   Resp Rate Total 32 br/min   Peak Airway Pressure 14 cmH2O   Mean Airway Pressure 9.6 cmH20   Exhaled Vt 518 mL   Total Ve 15.2 mL   Spont Ve 0 L   I:E Ratio Measured 1.10:1   Conventional Ventilator Alarms   Ve High Alarm 24 L/min   Resp Rate High Alarm 50 br/min   Press High Alarm 50 cmH2O   Apnea Rate 10   Apnea Volume (mL) 500 mL   Apnea Oxygen Concentration  100   Apnea Flow Rate (L/min) 60   T Apnea 20 sec(s)   Ready to Wean/Extubation Screen   FIO2<60 (chart decimal) 0.3   MV<16L (chart vol.) 15.2   PEEP <10 (chart #) 5   Ready to Wean Parameters   F/VT Ratio<105 (RSBI) (!) 63.71

## 2017-02-09 LAB
ALBUMIN SERPL BCP-MCNC: 2.1 G/DL
ALP SERPL-CCNC: 115 U/L
ALT SERPL W/O P-5'-P-CCNC: 18 U/L
ANION GAP SERPL CALC-SCNC: 9 MMOL/L
AST SERPL-CCNC: 63 U/L
BACTERIA BLD CULT: NORMAL
BASOPHILS # BLD AUTO: 0 K/UL
BASOPHILS NFR BLD: 0.4 %
BILIRUB SERPL-MCNC: 0.8 MG/DL
BUN SERPL-MCNC: 20 MG/DL
CALCIUM SERPL-MCNC: 8.5 MG/DL
CHLORIDE SERPL-SCNC: 102 MMOL/L
CO2 SERPL-SCNC: 23 MMOL/L
CREAT SERPL-MCNC: 0.7 MG/DL
DIFFERENTIAL METHOD: ABNORMAL
EOSINOPHIL # BLD AUTO: 0.1 K/UL
EOSINOPHIL NFR BLD: 1 %
ERYTHROCYTE [DISTWIDTH] IN BLOOD BY AUTOMATED COUNT: 18.1 %
EST. GFR  (AFRICAN AMERICAN): >60 ML/MIN/1.73 M^2
EST. GFR  (NON AFRICAN AMERICAN): >60 ML/MIN/1.73 M^2
GLUCOSE SERPL-MCNC: 85 MG/DL
HCT VFR BLD AUTO: 30 %
HGB BLD-MCNC: 9.5 G/DL
LYMPHOCYTES # BLD AUTO: 3.9 K/UL
LYMPHOCYTES NFR BLD: 42.3 %
MCH RBC QN AUTO: 25.7 PG
MCHC RBC AUTO-ENTMCNC: 31.6 %
MCV RBC AUTO: 81 FL
MONOCYTES # BLD AUTO: 0.9 K/UL
MONOCYTES NFR BLD: 9.4 %
NEUTROPHILS # BLD AUTO: 4.4 K/UL
NEUTROPHILS NFR BLD: 46.9 %
PLATELET # BLD AUTO: 286 K/UL
PMV BLD AUTO: 8.6 FL
POTASSIUM SERPL-SCNC: 4.1 MMOL/L
PROT SERPL-MCNC: 8 G/DL
RBC # BLD AUTO: 3.69 M/UL
SODIUM SERPL-SCNC: 134 MMOL/L
WBC # BLD AUTO: 9.3 K/UL

## 2017-02-09 PROCEDURE — 36415 COLL VENOUS BLD VENIPUNCTURE: CPT

## 2017-02-09 PROCEDURE — 99900022

## 2017-02-09 PROCEDURE — 99233 SBSQ HOSP IP/OBS HIGH 50: CPT | Mod: ,,, | Performed by: INTERNAL MEDICINE

## 2017-02-09 PROCEDURE — 94761 N-INVAS EAR/PLS OXIMETRY MLT: CPT

## 2017-02-09 PROCEDURE — 85025 COMPLETE CBC W/AUTO DIFF WBC: CPT

## 2017-02-09 PROCEDURE — 94770 HC EXHALED C02 TEST: CPT

## 2017-02-09 PROCEDURE — 25000003 PHARM REV CODE 250: Performed by: INTERNAL MEDICINE

## 2017-02-09 PROCEDURE — 94003 VENT MGMT INPAT SUBQ DAY: CPT

## 2017-02-09 PROCEDURE — 63600175 PHARM REV CODE 636 W HCPCS: Performed by: NURSE PRACTITIONER

## 2017-02-09 PROCEDURE — 63600175 PHARM REV CODE 636 W HCPCS: Performed by: INTERNAL MEDICINE

## 2017-02-09 PROCEDURE — C9113 INJ PANTOPRAZOLE SODIUM, VIA: HCPCS | Performed by: NURSE PRACTITIONER

## 2017-02-09 PROCEDURE — 27000221 HC OXYGEN, UP TO 24 HOURS

## 2017-02-09 PROCEDURE — 25000003 PHARM REV CODE 250: Performed by: HOSPITALIST

## 2017-02-09 PROCEDURE — 20000000 HC ICU ROOM

## 2017-02-09 PROCEDURE — 80053 COMPREHEN METABOLIC PANEL: CPT

## 2017-02-09 RX ORDER — CEFEPIME HYDROCHLORIDE 2 G/50ML
2 INJECTION, SOLUTION INTRAVENOUS
Status: DISCONTINUED | OUTPATIENT
Start: 2017-02-09 | End: 2017-02-14 | Stop reason: HOSPADM

## 2017-02-09 RX ADMIN — POLYETHYLENE GLYCOL (3350) 17 G: 17 POWDER, FOR SOLUTION ORAL at 09:02

## 2017-02-09 RX ADMIN — CARVEDILOL 12.5 MG: 6.25 TABLET, FILM COATED ORAL at 09:02

## 2017-02-09 RX ADMIN — CHLORHEXIDINE GLUCONATE 15 ML: 1.2 RINSE ORAL at 09:02

## 2017-02-09 RX ADMIN — PANTOPRAZOLE SODIUM 40 MG: 40 INJECTION, POWDER, FOR SOLUTION INTRAVENOUS at 09:02

## 2017-02-09 RX ADMIN — GLYCOPYRROLATE 1 MG: 1 TABLET ORAL at 05:02

## 2017-02-09 RX ADMIN — MEROPENEM AND SODIUM CHLORIDE 1 G: 1 INJECTION, SOLUTION INTRAVENOUS at 05:02

## 2017-02-09 RX ADMIN — LACTOBACILLUS TAB 1 TABLET: TAB at 08:02

## 2017-02-09 RX ADMIN — LACTOBACILLUS TAB 1 TABLET: TAB at 05:02

## 2017-02-09 RX ADMIN — LEVETIRACETAM 500 MG: 500 SOLUTION ORAL at 09:02

## 2017-02-09 RX ADMIN — ENOXAPARIN SODIUM 40 MG: 100 INJECTION SUBCUTANEOUS at 11:02

## 2017-02-09 RX ADMIN — GLYCOPYRROLATE 1 MG: 1 TABLET ORAL at 09:02

## 2017-02-09 RX ADMIN — MINERAL SUPPLEMENT IRON 300 MG / 5 ML STRENGTH LIQUID 100 PER BOX UNFLAVORED 300 MG: at 09:02

## 2017-02-09 RX ADMIN — DEXTROSE MONOHYDRATE 100 MG: 50 INJECTION, SOLUTION INTRAVENOUS at 07:02

## 2017-02-09 RX ADMIN — CEFEPIME HYDROCHLORIDE 2 G: 2 INJECTION, SOLUTION INTRAVENOUS at 06:02

## 2017-02-09 RX ADMIN — ROSUVASTATIN CALCIUM 5 MG: 5 TABLET, FILM COATED ORAL at 09:02

## 2017-02-09 RX ADMIN — LACTOBACILLUS TAB 1 TABLET: TAB at 12:02

## 2017-02-09 RX ADMIN — GLYCOPYRROLATE 1 MG: 1 TABLET ORAL at 01:02

## 2017-02-09 NOTE — PLAN OF CARE
Problem: Patient Care Overview  Goal: Plan of Care Review  Outcome: Revised  Increased temp again today, Dr. Sosa consulted. Tolerating TF. Skin intact. Safety maintained.

## 2017-02-09 NOTE — PLAN OF CARE
02/08/17 2000   Patient Assessment/Suction   Level of Consciousness (AVPU) alert   All Lung Fields Breath Sounds coarse  (Simultaneous filing. User may not have seen previous data.)   Rhythm/Pattern, Respiratory ventilator assisted   Cough Frequency infrequent  (Simultaneous filing. User may not have seen previous data.)   Cough Type fair  (Simultaneous filing. User may not have seen previous data.)   Suction Method in-line suction catheter (closed);tracheostomy  (Simultaneous filing. User may not have seen previous data.)   $ Suction Charges Airway Maintenance Stat Charge   Sputum Amount moderate   Sputum Color tan   Sputum Consistency thick   PRE-TX-O2-ETCO2   O2 Device (Oxygen Therapy) ventilator   Oxygen Concentration (%) 30   SpO2 96 %   Pulse Oximetry Type Continuous   ETCO2 (mmHg) 31 mmHg   Pulse 72   Resp (!) 22   Temp 99 °F (37.2 °C)   /74   Wound Care   Skin Color/Characteristics without discoloration   Skin Temperature warm   Vent Select   Conventional Vent Y   IHI Ventilator Associated Pneumonia Bundle   Oral Care Suction toothette toothbrush   Preset Conventional Ventilator Settings   Vent Type    Ventilation Type VC   Vent Mode A/C   Humidity HME   Set Rate 22 bmp   Vt Set 500 mL   PEEP/CPAP 5 cmH20   Pressure Support 0 cmH20   Waveform RAMP   Peak Flow 55 L/min   Set Inspiratory Pressure 0 cmH20   Insp Time 0 Sec(s)   Plateau Set/Insp. Hold (sec) 0   Insp Rise Time  0 %   Trigger Sensitivity Flow/I-Trigger 1.5 L/min   P High 0 cm H2O   P Low 0 cm H2O   T High 0 sec   T Low 0 sec   Patient Ventilator Parameters   Resp Rate Total 22 br/min   Peak Airway Pressure 19 cmH2O   Mean Airway Pressure 11 cmH20   Exhaled Vt 507 mL   Total Ve 11.2 mL   Spont Ve 0 L   I:E Ratio Measured 1:1.70   Conventional Ventilator Alarms   Ve High Alarm 24 L/min   Resp Rate High Alarm 50 br/min   Press High Alarm 50 cmH2O   Apnea Rate 10   Apnea Volume (mL) 500 mL   Apnea Oxygen Concentration  100   Apnea Flow  Rate (L/min) 60   T Apnea 20 sec(s)   Ready to Wean/Extubation Screen   FIO2<60 (chart decimal) 0.3   MV<16L (chart vol.) 11.2   PEEP <10 (chart #) 5   Ready to Wean Parameters   F/VT Ratio<105 (RSBI) (!) 43.39

## 2017-02-09 NOTE — PLAN OF CARE
02/09/17 0833       Surgical Airway Shiley Long;Distal;Cuffed   No Placement Date or Time found.   Present Prior to Hospital Arrival?: Yes  Type: Tracheostomy  Brand: Shiley  Airway Device Size: 7.0  Style: Long;Distal;Cuffed   Status Secured   Site Assessment Clean;Dry   Ties Assessment Secure;Intact   Vent Select   Conventional Vent Y   Preset Conventional Ventilator Settings   Vent Type    Conventional Ventilator Alarms   Alarms On Y

## 2017-02-09 NOTE — PROGRESS NOTES
"Progress Note  Hospital Medicine  Patient Name:Emilie Proctor Sr.  MRN:  [id number]  Patient Class: IP- Inpatient  Admit Date: 2/2/2017  Length of Stay: 7 days  Expected Discharge Date:   Attending Physician: Socorro Chicas MD  Primary Care Provider:  Marcial Avila MD    SUBJECTIVE:     Principal Problem: Pneumonia due to Escherichia coli  Initial history of present illness: Patient is a 66 y.o. male admitted to Hospitalist Service from [facility] with complaint of "Pneumonia". Patient has PMH significant for anoxic brain injury, hypertension, seizures, hyperlipidemia, chronic respiratory failure, history of alcohol and drug abuse, history of CVA and DM-2. EMS reported patient had worsening chest XRs which showed bilateral pneumonia. Patient has a temperature of 100.8 degrees F. Patient was sent over for further evaluation of possible aspiration. Patient not able to communicate. Looks ill and exhausted.    PMH/PSH/SH/FH/Meds: reviewed.    Symptoms/Review of Systems: [id number] F. No distress.  Diet:  PEG/J-tube    Activity level: Bed bound  Pain:  No acute distress    OBJECTIVE:   Vital Signs (Most Recent):      Temp: 98.8 °F (37.1 °C) (02/09/17 0400)  Pulse: 74 (02/09/17 0833)  Resp: (!) 32 (02/09/17 0833)  BP: 109/60 (02/09/17 0600)  SpO2: 100 % (02/09/17 0833)       Vital Signs Range (Last 24H):  Temp:  [98.8 °F (37.1 °C)-101.1 °F (38.4 °C)]   Pulse:  [63-85]   Resp:  [22-34]   BP: ()/(55-74)   SpO2:  [93 %-100 %]     Weight: 67.5 kg (148 lb 13 oz)  Body mass index is 21.98 kg/(m^2).    Intake/Output Summary (Last 24 hours) at 02/09/17 0935  Last data filed at 02/09/17 0705   Gross per 24 hour   Intake             1835 ml   Output             1285 ml   Net              550 ml     Physical Examination:  General appearance: well developed, appears stated age, chronically ill appearing  Head: normocephalic, atraumatic  Eyes: conjunctivae/corneas clear. PERRL.  Nose: Nares normal. Septum " midline.  Throat: lips, mucosa, and tongue normal; teeth and gums normal, no throat erythema.  Neck: supple, symmetrical, trachea midline, no JVD and thyroid not enlarged, symmetric, no tenderness/mass/nodules. Trach noted.  Lungs: clear to auscultation bilaterally and normal respiratory effort  Chest wall: no tenderness  Heart: regular rate and rhythm, S1, S2 normal, no murmur, click, rub or gallop  Abdomen: soft, non-tender non-distented; bowel sounds normal; no masses, no organomegaly. PEG and J tube site with scant discharge  Extremities: no cyanosis, clubbing or edema.   Pulses: 2+ and symmetric  Skin: Skin color, texture, turgor normal. Stage 2 sacral decubitus (refer to nursing assessment)  Lymph nodes: Cervical, supraclavicular, and axillary nodes normal.  Neurologic: Anoxic brain injury. Non-interactive.    CBC:    Recent Labs  Lab 02/07/17  0322 02/08/17  0323 02/09/17  0401   WBC 7.50 8.30 9.30   RBC 3.47* 3.43* 3.69*   HGB 9.1* 8.8* 9.5*   HCT 28.3* 27.8* 30.0*    254 286   MCV 82 81* 81*   MCH 26.3* 25.7* 25.7*   MCHC 32.2 31.7* 31.6*   BMP    Recent Labs  Lab 02/04/17  0353 02/05/17  0327  02/07/17  0322 02/08/17  0323 02/09/17  0401   * 101  < > 100 95 85    131*  < > 133* 134* 134*   K 3.4* 4.1  < > 3.7 4.0 4.1    103  < > 102 102 102   CO2 23 21*  < > 23 26 23   BUN 25* 16  < > 18 19 20   CREATININE 0.7 0.6  < > 0.7 0.7 0.7   CALCIUM 8.2* 8.4*  < > 8.3* 8.3* 8.5*   MG 1.7 1.6  --   --   --   --    < > = values in this interval not displayed.   Diagnostic Results:  Microbiology Results (last 7 days)     Procedure Component Value Units Date/Time    Blood culture [[id number]] Collected:  02/03/17 1830    Order Status:  Completed Specimen:  Blood Updated:  02/09/17 0612     Blood Culture, Routine No growth after 5 days.    Urine culture [[id number]] Collected:  02/08/17 1843    Order Status:  Sent Specimen:  Urine from Urine, Catheterized Updated:  02/09/17 0018    Blood culture  [[id number]] Collected:  02/06/17 0601    Order Status:  Completed Specimen:  Blood from Peripheral, Left  Hand Updated:  02/08/17 1212     Blood Culture, Routine No Growth to date     Blood Culture, Routine No Growth to date     Blood Culture, Routine No Growth to date    Blood culture [[id number]] Collected:  02/06/17 0628    Order Status:  Completed Specimen:  Blood from Peripheral, Left  Hand Updated:  02/08/17 1212     Blood Culture, Routine No Growth to date     Blood Culture, Routine No Growth to date     Blood Culture, Routine No Growth to date    Blood culture #1 **CANNOT BE ORDERED STAT** [[id number]] Collected:  02/02/17 1613    Order Status:  Completed Specimen:  Blood Updated:  02/07/17 2322     Blood Culture, Routine No growth after 5 days.    Blood culture #2 **CANNOT BE ORDERED STAT** [[id number]] Collected:  02/02/17 1647    Order Status:  Completed Specimen:  Blood Updated:  02/07/17 2322     Blood Culture, Routine No growth after 5 days.    Blood culture [[id number]]     Order Status:  Canceled Specimen:  Blood     Narrative:       Culture Blood was cancelled on 02/06/2017 at 06:02 by [facility]; Duplicate   order    Blood culture [[id number]]     Order Status:  Canceled Specimen:  Blood     Narrative:       Culture Blood was cancelled on 02/06/2017 at 06:02 by [facility]; Duplicate   order    Urine culture **CANNOT BE ORDERED STAT** [[id number]] Collected:  02/02/17 1628    Order Status:  Completed Specimen:  Urine from Urine, Catheterized Updated:  02/04/17 0310     Urine Culture, Routine No growth      Chest X-Ray: 1.  Stable radiographic appearance to the chest.  Persistent bilateral pulmonary opacities are noted for which differential considerations include pneumonia and aspiration in this patient with history of esophageal stent.  Small left pleural effusion noted.  2.  Stable dilatation of the aorta in this patient with known type B thoracic dissection.    CXR: Stable examination demonstrating patchy  bilateral airspace disease likely reflecting pneumonia.    CXR: Stable positioning of support devices. Patchy bilateral infiltrates without significant change.  New small left pleural effusion.    CXR: Continued right upper lobe pneumonia.  Infiltrate and/or atelectasis of the left lower lobe.  Tracheostomy in place    Assessment/Plan:   Sepsis with HCAP  HCAP - RUL (Serratia Marcens, E. Coli-ESBL)  Supplemental O2 via nasal canula; titrate O2 saturation to >92%. Follow Dr. Noonan's recommendations.  Continue beta 2 agonist bronchodilator treatments.   Continue IV antibiotics - Merrem.   Check sputum GS and Cx.   Continue routine medications as before. Follow Dr. Naylor's recommendations. Await venous doppler results of lower extremities.     Chronic respiratory failure with Tracheo-esophageal fistula  Continue mechanical ventilation  Continuous oximetry  Nebulized bronchodilator     Hemiplegia as late effect of stroke  Turn and position      Tracheostomy in place  Continue routine tracheostomy care      Functional quadriplegia  Turn and position      Gastrostomy status  Keep g tube open to gravity      Seizure disorder  Continue Levetiracetam, check level.    VTE Risk Mitigation         Ordered     enoxaparin injection 40 mg  Daily     Route:  Subcutaneous        02/02/17 1947     Low Risk of VTE  Once      02/02/17 1947        Socorro Chicas MD  Department of Hospital Medicine   [facility]

## 2017-02-09 NOTE — PLAN OF CARE
Continuous mechanical ventilation with  ventilator, continuous pulse oximetry and ETCO2 monitoring, Sx-PRN, and trach care q shift

## 2017-02-09 NOTE — PROGRESS NOTES
Progress Note  Infectious Disease    Follow-up For:  fever    SUBJECTIVE:   ROS:  tmax today of 100.7.   CXR shows evolution of RUL infiltrate. The US legs could not be performed with confidence due to his contractures.     Antibiotics     Start     Stop Route Frequency Ordered    02/03/17 0500  meropenem-0.9% sodium chloride 1 g/50 mL IVPB      -- IV Every 12 hours (non-standard times) 02/02/17 1947          Pertinent Medications noted:    EXAM & DIAGNOSTICS REVIEWED:   Vitals:     Temp:  [98.8 °F (37.1 °C)-100.7 °F (38.2 °C)]   Temp: (!) 100.7 °F (38.2 °C) (02/09/17 1100)  Pulse: 66 (02/09/17 1606)  Resp: (!) 24 (02/09/17 1606)  BP: 108/61 (02/09/17 1606)  SpO2: (!) 94 % (02/09/17 1606)    Intake/Output Summary (Last 24 hours) at 02/09/17 1739  Last data filed at 02/09/17 1200   Gross per 24 hour   Intake              925 ml   Output              725 ml   Net              200 ml       General:  In NAD. Looks comfortable, sleeping  Eyes:    ENT:  Mouth w/ pink MMM, no lesions/exudate,  Neck:  Trachea midline, supple, no adenopathy appreciated  Lungs:  Coarse, no consolidation  Heart:  RRR, no gallop/murmur noted  Abd:  soft, NT, ND, normal BS, no masses/organomegaly appreciated.  :  Zelaya draining yellow urine, clear  Musc:  Joints without effusion, erythema, synovitis,  Bilateral LE and RUE flexion contractures  Skin:  Generally warm, dry, normal for color. No rashes  Wound:   Neuro: Even when awake, can only answer by nod/shake of head.   Extrem: No edema, erythema, phlebitis, cellulitis, synovitis  VAD:  Peripheral     Lines/Tubes/Drains:    General Labs reviewed:    Recent Labs  Lab 02/09/17  0401   WBC 9.30   RBC 3.69*   HGB 9.5*   HCT 30.0*      MCV 81*   MCH 25.7*   MCHC 31.6*       Recent Labs  Lab 02/09/17  0401   CALCIUM 8.5*   PROT 8.0   *   K 4.1   CO2 23      BUN 20   CREATININE 0.7   ALKPHOS 115   ALT 18   AST 63*   BILITOT 0.8       Micro: nothing new    Imaging Reviewed: cxr  with evolution of right upper lobe infiltrate    ASSESSMENT & PLAN      Patient Active Problem List   Diagnosis    Aortic dissection distal to left subclavian    Hemiplegia as late effect of stroke    Tracheostomy in place    Functional quadriplegia    Jejunostomy tube in situ    Tracheo-esophageal fistula    Anemia, chronic disease    Pneumonia due to Escherichia coli    Acute on chronic respiratory failure with hypercapnia    Gastrostomy status    Protein-calorie malnutrition, moderate    Seizure disorder    History of CVA (cerebrovascular accident)    Anoxic brain injury    Aspiration into airway    Abnormal CXR    Malfunctioning jejunostomy tube    Acute cystitis with hematuria    Chronic respiratory failure    Transaminitis    Sepsis    HCAP (healthcare-associated pneumonia)   EVOLUTION OF RIGHT UPPER LOBE PNEUMONIA DESPITE MEROPENEM  In light of the multiple resistant organisms that he is colonized with, any of the previous ones could be responsible for this new infiltrate.     Recommendation: Empiric antibiotic change to cefepime plus tygacil, to cover the ESBLs, prior acinetobacter and pseudmonas. (the alternative is cetolozane/tazobactam as a single agent). The cost of either regimen may or may not be accepted by [facility]    This is another step closer to reaching an infection with a bug for which there is no treatment.

## 2017-02-09 NOTE — PROGRESS NOTES
Progress Note  PULMONARY    Admit Date: 2/2/2017 2/9/2017        SUBJECTIVE:     Feb 4, actually animated and following commands today.  No c/o or distress  Feb 5, not arousing much this am, no distress apparent  Feb 6, alert and seems to interact. No distress  Feb 7, stable bedside and no c/o  Feb 8, alert again today, no distress  Feb 9, alert again, looks stable.      PFSH and Allergies reviewed.  Pt's neuro status prevents obtaining ros.    OBJECTIVE:     Vitals (Most recent):  Vitals:    02/09/17 1606   BP: 108/61   Pulse: 66   Resp: (!) 24   Temp:        Vitals (24 hour range):  Temp:  [98.8 °F (37.1 °C)-100.7 °F (38.2 °C)]   Pulse:  [63-83]   Resp:  [22-32]   BP: ()/(56-74)   SpO2:  [90 %-100 %]       Intake/Output Summary (Last 24 hours) at 02/09/17 1625  Last data filed at 02/09/17 1200   Gross per 24 hour   Intake             1165 ml   Output              800 ml   Net              365 ml          Physical Exam:  The patient's neuro status (alertness,orientation,cognitive function,motor skills,), pharyngeal exam (oral lesions, hygiene, abn dentition,), Neck (jvd,mass,thyroid,nodes in neck and above/below clavicle),RESPIRATORY(symmetry,effort,fremitus,percussion,auscultation),  Cor(rhythm,heart tones including gallops,perfusion,edema)ABD(distention,hepatic&splenomegaly,tenderness,masses), Skin(rash,cyanosis),Psyc(affect,judgement,).  Exam negative except for these pertinent findings:    Alert, no distres.Trach, secretions controlled, no ecxess trach secretions, no edema, good bs.  Stable exam 2/9/2017      Diagnostic Results:  All labs last 4 entries of CBC,CMP/BMP,AGB, MICRO reviewed.  All rediographs of chest, lungs, & abd reviewed by direct vision last 3 days.         Results for REUBENKENNA SR. (MRN [id number]) as of 2/9/2017 16:08   Ref. Range 2/9/2017 04:01   WBC Latest Ref Range: 3.90 - 12.70 K/uL 9.30   RBC Latest Ref Range: 4.60 - 6.20 M/uL 3.69 (L)   Hemoglobin Latest Ref Range:  14.0 - 18.0 g/dL 9.5 (L)   Hematocrit Latest Ref Range: 40.0 - 54.0 % 30.0 (L)   MCV Latest Ref Range: 82 - 98 fL 81 (L)   MCH Latest Ref Range: 27.0 - 31.0 pg 25.7 (L)   MCHC Latest Ref Range: 32.0 - 36.0 % 31.6 (L)   RDW Latest Ref Range: 11.5 - 14.5 % 18.1 (H)   Platelets Latest Ref Range: 150 - 350 K/uL 286   MPV Latest Ref Range: 9.2 - 12.9 fL 8.6 (L)   Gran% Latest Ref Range: 38.0 - 73.0 % 46.9   Gran # Latest Ref Range: 1.8 - 7.7 K/uL 4.4   Lymph% Latest Ref Range: 18.0 - 48.0 % 42.3   Lymph # Latest Ref Range: 1.0 - 4.8 K/uL 3.9   Mono% Latest Ref Range: 4.0 - 15.0 % 9.4   Mono # Latest Ref Range: 0.3 - 1.0 K/uL 0.9   Eosinophil% Latest Ref Range: 0.0 - 8.0 % 1.0   Eos # Latest Ref Range: 0.0 - 0.5 K/uL 0.1   Basophil% Latest Ref Range: 0.0 - 1.9 % 0.4   Baso # Latest Ref Range: 0.00 - 0.20 K/uL 0.00   Sodium Latest Ref Range: 136 - 145 mmol/L 134 (L)   Potassium Latest Ref Range: 3.5 - 5.1 mmol/L 4.1   Chloride Latest Ref Range: 95 - 110 mmol/L 102   CO2 Latest Ref Range: 23 - 29 mmol/L 23   Anion Gap Latest Ref Range: 8 - 16 mmol/L 9   BUN, Bld Latest Ref Range: 8 - 23 mg/dL 20   Creatinine Latest Ref Range: 0.5 - 1.4 mg/dL 0.7   eGFR if non African American Latest Ref Range: >60 mL/min/1.73 m^2 >60   eGFR if  Latest Ref Range: >60 mL/min/1.73 m^2 >60   Glucose Latest Ref Range: 70 - 110 mg/dL 85   Calcium Latest Ref Range: 8.7 - 10.5 mg/dL 8.5 (L)   Alkaline Phosphatase Latest Ref Range: 55 - 135 U/L 115   Total Protein Latest Ref Range: 6.0 - 8.4 g/dL 8.0   Albumin Latest Ref Range: 3.5 - 5.2 g/dL 2.1 (L)   Total Bilirubin Latest Ref Range: 0.1 - 1.0 mg/dL 0.8   AST Latest Ref Range: 10 - 40 U/L 63 (H)   ALT Latest Ref Range: 10 - 44 U/L 18   Results for KENNA ALEXIS SR. (MRN [id number]) as of 2/9/2017 16:08   Ref. Range 2/8/2017 18:42   Specimen UA Unknown Urine, Catheterized   Color, UA Latest Ref Range: Yellow, Straw, [place]  Yellow   pH, UA Latest Ref Range: 5.0 - 8.0  8.0    Specific [place], UA Latest Ref Range: 1.005 - 1.030  1.015   Appearance, UA Latest Ref Range: Clear  Clear   Protein, UA Latest Ref Range: Negative  1+ (A)   Glucose, UA Latest Ref Range: Negative  Negative   Ketones, UA Latest Ref Range: Negative  Negative   Occult Blood UA Latest Ref Range: Negative  Negative   Nitrite, UA Latest Ref Range: Negative  Negative   Urobilinogen, UA Latest Ref Range: <2.0 EU/dL 4.0-6.0 (A)   Bilirubin (UA) Latest Ref Range: Negative  Negative   Leukocytes, UA Latest Ref Range: Negative  Negative   RBC, UA Latest Ref Range: 0 - 4 /hpf 2   WBC, UA Latest Ref Range: 0 - 5 /hpf 1   Bacteria, UA Latest Ref Range: None-Occ /hpf None   Hyaline Casts, UA Latest Ref Range: 0-1/lpf /lpf 0   Microscopic Comment Unknown SEE COMMENT       Susceptibility       Serratia marcescens Escherichia coli esbl       CULTURE, RESPIRATORY CULTURE, RESPIRATORY       Amikacin <=16  Sensitive <=16  Sensitive       Amox/K Clav'ate  >16/8  Resistant       Amp/Sulbactam  >16/8  Resistant       Ampicillin  >16  Resistant       Cefazolin  >16  Resistant       Cefepime <=8  Sensitive >16  Resistant       Ceftriaxone <=8  Sensitive >32  Resistant       Ciprofloxacin >2  Resistant >2  Resistant       Ertapenem <=2  Sensitive >4  Resistant       Gentamicin <=4  Sensitive <=4  Sensitive       Meropenem <=4  Sensitive <=4  Sensitive       Piperacillin/Tazo <=16  Sensitive <=16  Sensitive       Tetracycline  >8  Resistant       Tobramycin >8  Resistant 8  Intermediate       Trimeth/Sulfa >2/38  Resistant >2/38  Resistant                      Specimen Collected: 02/02/17 10:28 AM Last Resulted: 02/06/17 12:57 PM Lab Flowsheet Order Details View             ASSESSMENT/PLAN:     Patient Active Problem List   Diagnosis    Aortic dissection distal to left subclavian    Hemiplegia as late effect of stroke    Tracheostomy in place    Functional quadriplegia    Jejunostomy tube in situ    Tracheo-esophageal fistula     Anemia, chronic disease    Pneumonia due to Escherichia coli    Acute on chronic respiratory failure with hypercapnia    Gastrostomy status    Protein-calorie malnutrition, moderate    Seizure disorder    History of CVA (cerebrovascular accident)    Anoxic brain injury    Aspiration into airway    Abnormal CXR    Malfunctioning jejunostomy tube    Acute cystitis with hematuria    Chronic respiratory failure    Transaminitis    Sepsis    HCAP (healthcare-associated pneumonia)     Specialty Problems        Pulmonary Problems    Tracheostomy in place        Tracheo-esophageal fistula        * (Principal)Pneumonia due to Escherichia coli        Acute on chronic respiratory failure with hypercapnia        Aspiration into airway        Chronic respiratory failure        Pneumonia due to infectious organism                  rec     Feb 6, looks good, alert, sourse fever not clear but wbc good,  cxr na- if cxr stable could go to [place] for abx completion - another 3 days    Drop vancomcyin soon with above culture.    Feb 7, remains stable with low grade temp, wbc better and abx appropriate  Will stop vanc.    Feb8 , temp back up with nl wbc,  Pt has ongoing aspiration with t e fistula palliated by stent.  Prolonged abx will not change fistula.      Would defer adjustment rx to ID, Dr Parra.  Would not manipulate trach for fear of fistula problems.  Trial anti pyretics?     Feb 9, day 7 abx, low grade temp. U/a ok.  c dif and leg study na.     No new recs.  Approaching adequate course of abx for lungs?   Will not manipulate fistula/trach. No new rec, continue rx/support.              .

## 2017-02-09 NOTE — PLAN OF CARE
Problem: Patient Care Overview  Goal: Plan of Care Review  Tolerating tube feed. Temp max 100.1 this shift.

## 2017-02-09 NOTE — PROGRESS NOTES
Pt received trached with a #7.0 Shiley XLT trach on  ventilator on settings as documeneted.  All alarms on and audible.  Ambu bag and mask @ HOB.  Pt is on continuous pulse oximetry and ETCO2 monitoring and trach care is done q shift.  Pt appears to be resting quetly at this time and in NAD.

## 2017-02-10 LAB
ALBUMIN SERPL BCP-MCNC: 2.1 G/DL
ALP SERPL-CCNC: 132 U/L
ALT SERPL W/O P-5'-P-CCNC: 21 U/L
ANION GAP SERPL CALC-SCNC: 5 MMOL/L
AST SERPL-CCNC: 32 U/L
BACTERIA UR CULT: NO GROWTH
BASOPHILS # BLD AUTO: 0 K/UL
BASOPHILS NFR BLD: 0.2 %
BILIRUB SERPL-MCNC: 0.7 MG/DL
BUN SERPL-MCNC: 24 MG/DL
CALCIUM SERPL-MCNC: 8.3 MG/DL
CHLORIDE SERPL-SCNC: 101 MMOL/L
CO2 SERPL-SCNC: 25 MMOL/L
CREAT SERPL-MCNC: 0.6 MG/DL
DIFFERENTIAL METHOD: ABNORMAL
EOSINOPHIL # BLD AUTO: 0.1 K/UL
EOSINOPHIL NFR BLD: 1.6 %
ERYTHROCYTE [DISTWIDTH] IN BLOOD BY AUTOMATED COUNT: 18 %
EST. GFR  (AFRICAN AMERICAN): >60 ML/MIN/1.73 M^2
EST. GFR  (NON AFRICAN AMERICAN): >60 ML/MIN/1.73 M^2
GLUCOSE SERPL-MCNC: 108 MG/DL
HCT VFR BLD AUTO: 29.7 %
HGB BLD-MCNC: 9.6 G/DL
LYMPHOCYTES # BLD AUTO: 3.4 K/UL
LYMPHOCYTES NFR BLD: 37.4 %
MCH RBC QN AUTO: 26.4 PG
MCHC RBC AUTO-ENTMCNC: 32.4 %
MCV RBC AUTO: 82 FL
MONOCYTES # BLD AUTO: 0.7 K/UL
MONOCYTES NFR BLD: 7.7 %
NEUTROPHILS # BLD AUTO: 4.8 K/UL
NEUTROPHILS NFR BLD: 53.1 %
PLATELET # BLD AUTO: 318 K/UL
PMV BLD AUTO: 8.3 FL
POTASSIUM SERPL-SCNC: 4.1 MMOL/L
PROT SERPL-MCNC: 7.9 G/DL
RBC # BLD AUTO: 3.64 M/UL
SODIUM SERPL-SCNC: 131 MMOL/L
WBC # BLD AUTO: 9.1 K/UL

## 2017-02-10 PROCEDURE — 99900026 HC AIRWAY MAINTENANCE (STAT)

## 2017-02-10 PROCEDURE — 99231 SBSQ HOSP IP/OBS SF/LOW 25: CPT | Mod: ,,, | Performed by: INTERNAL MEDICINE

## 2017-02-10 PROCEDURE — 99232 SBSQ HOSP IP/OBS MODERATE 35: CPT | Mod: ,,, | Performed by: INTERNAL MEDICINE

## 2017-02-10 PROCEDURE — 94761 N-INVAS EAR/PLS OXIMETRY MLT: CPT

## 2017-02-10 PROCEDURE — 80053 COMPREHEN METABOLIC PANEL: CPT

## 2017-02-10 PROCEDURE — 20000000 HC ICU ROOM

## 2017-02-10 PROCEDURE — 63600175 PHARM REV CODE 636 W HCPCS: Performed by: INTERNAL MEDICINE

## 2017-02-10 PROCEDURE — 85025 COMPLETE CBC W/AUTO DIFF WBC: CPT

## 2017-02-10 PROCEDURE — 94770 HC EXHALED C02 TEST: CPT

## 2017-02-10 PROCEDURE — 94003 VENT MGMT INPAT SUBQ DAY: CPT

## 2017-02-10 PROCEDURE — 97802 MEDICAL NUTRITION INDIV IN: CPT

## 2017-02-10 PROCEDURE — 63600175 PHARM REV CODE 636 W HCPCS: Performed by: NURSE PRACTITIONER

## 2017-02-10 PROCEDURE — 27000221 HC OXYGEN, UP TO 24 HOURS

## 2017-02-10 PROCEDURE — 25000003 PHARM REV CODE 250: Performed by: INTERNAL MEDICINE

## 2017-02-10 PROCEDURE — 25000003 PHARM REV CODE 250: Performed by: HOSPITALIST

## 2017-02-10 PROCEDURE — C9113 INJ PANTOPRAZOLE SODIUM, VIA: HCPCS | Performed by: NURSE PRACTITIONER

## 2017-02-10 PROCEDURE — 36415 COLL VENOUS BLD VENIPUNCTURE: CPT

## 2017-02-10 RX ADMIN — POLYETHYLENE GLYCOL (3350) 17 G: 17 POWDER, FOR SOLUTION ORAL at 08:02

## 2017-02-10 RX ADMIN — ROSUVASTATIN CALCIUM 5 MG: 5 TABLET, FILM COATED ORAL at 08:02

## 2017-02-10 RX ADMIN — PANTOPRAZOLE SODIUM 40 MG: 40 INJECTION, POWDER, FOR SOLUTION INTRAVENOUS at 08:02

## 2017-02-10 RX ADMIN — GLYCOPYRROLATE 1 MG: 1 TABLET ORAL at 09:02

## 2017-02-10 RX ADMIN — CHLORHEXIDINE GLUCONATE 15 ML: 1.2 RINSE ORAL at 08:02

## 2017-02-10 RX ADMIN — MINERAL SUPPLEMENT IRON 300 MG / 5 ML STRENGTH LIQUID 100 PER BOX UNFLAVORED 300 MG: at 08:02

## 2017-02-10 RX ADMIN — CEFEPIME HYDROCHLORIDE 2 G: 2 INJECTION, SOLUTION INTRAVENOUS at 06:02

## 2017-02-10 RX ADMIN — GLYCOPYRROLATE 1 MG: 1 TABLET ORAL at 05:02

## 2017-02-10 RX ADMIN — CARVEDILOL 12.5 MG: 6.25 TABLET, FILM COATED ORAL at 08:02

## 2017-02-10 RX ADMIN — LACTOBACILLUS TAB 1 TABLET: TAB at 05:02

## 2017-02-10 RX ADMIN — DEXTROSE MONOHYDRATE 50 MG: 5 INJECTION, SOLUTION INTRAVENOUS at 05:02

## 2017-02-10 RX ADMIN — LEVETIRACETAM 500 MG: 500 SOLUTION ORAL at 08:02

## 2017-02-10 RX ADMIN — LACTOBACILLUS TAB 1 TABLET: TAB at 11:02

## 2017-02-10 RX ADMIN — ENOXAPARIN SODIUM 40 MG: 100 INJECTION SUBCUTANEOUS at 11:02

## 2017-02-10 RX ADMIN — LACTOBACILLUS TAB 1 TABLET: TAB at 08:02

## 2017-02-10 RX ADMIN — GLYCOPYRROLATE 1 MG: 1 TABLET ORAL at 02:02

## 2017-02-10 NOTE — PROGRESS NOTES
This note also relates to the following rows which could not be included:  Peak Airway Pressure - Cannot attach notes to unvalidated device data  Mean Airway Pressure - Cannot attach notes to unvalidated device data  Exhaled Vt - Cannot attach notes to unvalidated device data  Total Ve - Cannot attach notes to unvalidated device data  I:E Ratio Measured - Cannot attach notes to unvalidated device data  Ve High Alarm - Cannot attach notes to unvalidated device data  Resp Rate High Alarm - Cannot attach notes to unvalidated device data  Press High Alarm - Cannot attach notes to unvalidated device data  Apnea Rate - Cannot attach notes to unvalidated device data  Apnea Volume (mL) - Cannot attach notes to unvalidated device data  Apnea Oxygen Concentration  - Cannot attach notes to unvalidated device data  Apnea Flow Rate (L/min) - Cannot attach notes to unvalidated device data  T Apnea - Cannot attach notes to unvalidated device data  Spont Ve - Cannot attach notes to unvalidated device data       02/10/17 0100   Patient Assessment/Suction   Level of Consciousness (AVPU) alert   Respiratory Effort Unlabored   Expansion/Accessory Muscles/Retractions no retractions;no use of accessory muscles   All Lung Fields Breath Sounds coarse;diminished   Rhythm/Pattern, Respiratory ventilator assisted   Cough Frequency infrequent   Cough Type fair   Suction Method in-line suction catheter (closed);tracheostomy   $ Suction Charges Airway Maintenance Stat Charge   Sputum Amount small   Sputum Color tan   Sputum Consistency thick   PRE-TX-O2-ETCO2   O2 Device (Oxygen Therapy) ventilator   Oxygen Concentration (%) 30   SpO2 95 %   Pulse Oximetry Type Continuous   ETCO2 (mmHg) 30 mmHg   Pulse 68   Resp (!) 22   /69       Surgical Airway Shiley Long;Distal;Cuffed   No Placement Date or Time found.   Present Prior to Hospital Arrival?: Yes  Type: Tracheostomy  Brand: Dermira  Airway Device Size: 7.0  Style: Long;Distal;Cuffed   Cuff  Pressure 30 cm H2O   Status Secured   Site Assessment Clean;Dry   Site Care Protective barrier to skin   Inner Cannula Care Cleansed/dried   Ties Assessment Clean;Dry;Intact   Respiratory Interventions   Airway/Ventilation Management pulmonary hygiene promoted;humidification applied;airway patency maintained   VAP Prevention HOB elevation maintained;oral care regularly provided;vent circuit breaks minimized   Vent Select   Conventional Vent Y   IHI Ventilator Associated Pneumonia Bundle   Daily Awakening Trials Performed Not applicable   Preset Conventional Ventilator Settings   Vent Type    Ventilation Type VC   Vent Mode A/C   Humidity HME   Set Rate 22 bmp   Vt Set 500 mL   PEEP/CPAP 5 cmH20   Waveform RAMP   Peak Flow 75 L/min   Plateau Set/Insp. Hold (sec) 0   Trigger Sensitivity Flow/I-Trigger 1.5 L/min   Patient Ventilator Parameters   Resp Rate Total 22 br/min

## 2017-02-10 NOTE — PROGRESS NOTES
"Progress Note  Hospital Medicine  Patient Name:Emilie Proctor Sr.  MRN:  [id number]  Patient Class: IP- Inpatient  Admit Date: 2/2/2017  Length of Stay: 8 days  Expected Discharge Date:   Attending Physician: Socorro Chicas MD  Primary Care Provider:  Marcial Avila MD    SUBJECTIVE:     Principal Problem: Pneumonia due to Escherichia coli  Initial history of present illness: Patient is a 66 y.o. male admitted to Hospitalist Service from [facility] with complaint of "Pneumonia". Patient has PMH significant for anoxic brain injury, hypertension, seizures, hyperlipidemia, chronic respiratory failure, history of alcohol and drug abuse, history of CVA and DM-2. EMS reported patient had worsening chest XRs which showed bilateral pneumonia. Patient has a temperature of 100.8 degrees F. Patient was sent over for further evaluation of possible aspiration. Patient not able to communicate. Looks ill and exhausted.    PMH/PSH/SH/FH/Meds: reviewed.    Symptoms/Review of Systems: Tmax 100.7 F. No distress.  Diet:  PEG/J-tube    Activity level: Bed bound  Pain:  No acute distress    OBJECTIVE:   Vital Signs (Most Recent):      Temp: 98.9 °F (37.2 °C) (02/10/17 0745)  Pulse: 74 (02/10/17 0845)  Resp: (!) 22 (02/10/17 0845)  BP: 139/76 (02/10/17 0800)  SpO2: 95 % (02/10/17 0845)       Vital Signs Range (Last 24H):  Temp:  [98.9 °F (37.2 °C)-100.7 °F (38.2 °C)]   Pulse:  [65-82]   Resp:  [22-36]   BP: (105-139)/(61-80)   SpO2:  [90 %-97 %]     Weight: 67.5 kg (148 lb 13 oz)  Body mass index is 21.98 kg/(m^2).    Intake/Output Summary (Last 24 hours) at 02/10/17 0914  Last data filed at 02/10/17 0800   Gross per 24 hour   Intake             1160 ml   Output             1335 ml   Net             -175 ml     Physical Examination:  General appearance: well developed, appears stated age, chronically ill appearing  Head: normocephalic, atraumatic  Eyes: conjunctivae/corneas clear. PERRL.  Nose: Nares normal. Septum " midline.  Throat: lips, mucosa, and tongue normal; teeth and gums normal, no throat erythema.  Neck: supple, symmetrical, trachea midline, no JVD and thyroid not enlarged, symmetric, no tenderness/mass/nodules. Trach noted.  Lungs: clear to auscultation bilaterally and normal respiratory effort  Chest wall: no tenderness  Heart: regular rate and rhythm, S1, S2 normal, no murmur, click, rub or gallop  Abdomen: soft, non-tender non-distented; bowel sounds normal; no masses, no organomegaly. PEG and J tube site with scant discharge  Extremities: no cyanosis, clubbing or edema.   Pulses: 2+ and symmetric  Skin: Skin color, texture, turgor normal. Stage 2 sacral decubitus (refer to nursing assessment)  Lymph nodes: Cervical, supraclavicular, and axillary nodes normal.  Neurologic: Anoxic brain injury. Non-interactive.    CBC:    Recent Labs  Lab 02/08/17  0323 02/09/17  0401 02/10/17  0329   WBC 8.30 9.30 9.10   RBC 3.43* 3.69* 3.64*   HGB 8.8* 9.5* 9.6*   HCT 27.8* 30.0* 29.7*    286 318   MCV 81* 81* 82   MCH 25.7* 25.7* 26.4*   MCHC 31.7* 31.6* 32.4   BMP    Recent Labs  Lab 02/04/17  0353 02/05/17  0327  02/08/17  0323 02/09/17  0401 02/10/17  0329   * 101  < > 95 85 108    131*  < > 134* 134* 131*   K 3.4* 4.1  < > 4.0 4.1 4.1    103  < > 102 102 101   CO2 23 21*  < > 26 23 25   BUN 25* 16  < > 19 20 24*   CREATININE 0.7 0.6  < > 0.7 0.7 0.6   CALCIUM 8.2* 8.4*  < > 8.3* 8.5* 8.3*   MG 1.7 1.6  --   --   --   --    < > = values in this interval not displayed.   Diagnostic Results:  Microbiology Results (last 7 days)     Procedure Component Value Units Date/Time    Urine culture [[id number]] Collected:  02/08/17 1843    Order Status:  Completed Specimen:  Urine from Urine, Catheterized Updated:  02/10/17 0716     Urine Culture, Routine No growth    Blood culture [[id number]] Collected:  02/06/17 0601    Order Status:  Completed Specimen:  Blood from Peripheral, Left  Hand Updated:  02/09/17  1212     Blood Culture, Routine No Growth to date     Blood Culture, Routine No Growth to date     Blood Culture, Routine No Growth to date     Blood Culture, Routine No Growth to date    Blood culture [[id number]] Collected:  02/06/17 0628    Order Status:  Completed Specimen:  Blood from Peripheral, Left  Hand Updated:  02/09/17 1212     Blood Culture, Routine No Growth to date     Blood Culture, Routine No Growth to date     Blood Culture, Routine No Growth to date     Blood Culture, Routine No Growth to date    Blood culture [[id number]] Collected:  02/03/17 1830    Order Status:  Completed Specimen:  Blood Updated:  02/09/17 0612     Blood Culture, Routine No growth after 5 days.    Blood culture #1 **CANNOT BE ORDERED STAT** [[id number]] Collected:  02/02/17 1613    Order Status:  Completed Specimen:  Blood Updated:  02/07/17 2322     Blood Culture, Routine No growth after 5 days.    Blood culture #2 **CANNOT BE ORDERED STAT** [[id number]] Collected:  02/02/17 1647    Order Status:  Completed Specimen:  Blood Updated:  02/07/17 2322     Blood Culture, Routine No growth after 5 days.    Blood culture [[id number]]     Order Status:  Canceled Specimen:  Blood     Narrative:       Culture Blood was cancelled on 02/06/2017 at 06:02 by [facility]; Duplicate   order    Blood culture [[id number]]     Order Status:  Canceled Specimen:  Blood     Narrative:       Culture Blood was cancelled on 02/06/2017 at 06:02 by [facility]; Duplicate   order    Urine culture **CANNOT BE ORDERED STAT** [[id number]] Collected:  02/02/17 1628    Order Status:  Completed Specimen:  Urine from Urine, Catheterized Updated:  02/04/17 0310     Urine Culture, Routine No growth      Chest X-Ray: 1.  Stable radiographic appearance to the chest.  Persistent bilateral pulmonary opacities are noted for which differential considerations include pneumonia and aspiration in this patient with history of esophageal stent.  Small left pleural effusion noted.  2.  Stable  dilatation of the aorta in this patient with known type B thoracic dissection.    CXR: Stable examination demonstrating patchy bilateral airspace disease likely reflecting pneumonia.    CXR: Stable positioning of support devices. Patchy bilateral infiltrates without significant change.  New small left pleural effusion.    CXR: Continued right upper lobe pneumonia.  Infiltrate and/or atelectasis of the left lower lobe.  Tracheostomy in place    Assessment/Plan:   Sepsis with HCAP  HCAP - RUL (Serratia Marcens, E. Coli-ESBL)  Supplemental O2 via nasal canula; titrate O2 saturation to >92%. Follow Dr. Noonan's recommendations.  Continue beta 2 agonist bronchodilator treatments.   Continue IV antibiotics - Merrem.   Check sputum GS and Cx.   Continue routine medications as before. Follow Dr. Naylor's recommendations. Await venous doppler results of lower extremities.     Chronic respiratory failure with Tracheo-esophageal fistula  Continue mechanical ventilation  Continuous oximetry  Nebulized bronchodilator     Hemiplegia as late effect of stroke  Turn and position      Tracheostomy in place  Continue routine tracheostomy care      Functional quadriplegia  Turn and position      Gastrostomy status  Keep g tube open to gravity      Seizure disorder  Continue Levetiracetam, check level.    Case management communicating with [facility] regarding IV antibiotics arrangements.    VTE Risk Mitigation         Ordered     enoxaparin injection 40 mg  Daily     Route:  Subcutaneous        02/02/17 1947     Low Risk of VTE  Once      02/02/17 1947        Socorro Chicas MD  Department of Hospital Medicine   [facility]

## 2017-02-10 NOTE — PLAN OF CARE
02/10/17 0712   Patient Assessment/Suction   Respiratory Effort Normal;Unlabored   All Lung Fields Breath Sounds coarse   Rhythm/Pattern, Respiratory ventilator assisted   $ Suction Charges Close Suction System Equipment   Sputum Amount moderate   Sputum Color tan   Sputum Consistency thick   PRE-TX-O2-ETCO2   O2 Device (Oxygen Therapy) ventilator   $ Is the patient on Oxygen? Yes   Oxygen Concentration (%) 30   SpO2 96 %   $ Pulse Oximetry - Multiple Charge Pulse Oximetry - Multiple   ETCO2 (mmHg) 31 mmHg   $ ETCO2 Charge Exhaled CO2 Monitoring   Pulse 78   Resp (!) 22       Surgical Airway Shiley Long;Distal;Cuffed   No Placement Date or Time found.   Present Prior to Hospital Arrival?: Yes  Type: Tracheostomy  Brand: Shiley  Airway Device Size: 7.0  Style: Long;Distal;Cuffed   Cuff Pressure 28 cm H2O   Status Secured   Site Assessment Clean;Dry;No bleeding;No drainage   Ties Assessment Clean;Dry;Intact;Secure   Respiratory Interventions   VAP Prevention HOB elevation maintained   Vent Select   Conventional Vent Y   $ Ventilator Charges Ventilator Subsequent   IHI Ventilator Associated Pneumonia Bundle   Head of Bed Elevated  HOB 30   Preset Conventional Ventilator Settings   Vent Type    Ventilation Type VC   Vent Mode A/C   Set Rate 22 bmp   Vt Set 500 mL   PEEP/CPAP 5 cmH20   Pressure Support 0 cmH20   Waveform RAMP   Peak Flow 75 L/min   Set Inspiratory Pressure 0 cmH20   Insp Time 0 Sec(s)   Plateau Set/Insp. Hold (sec) 0   Insp Rise Time  0 %   Trigger Sensitivity Flow/I-Trigger 1.5 L/min   P High 0 cm H2O   P Low 0 cm H2O   T High 0 sec   T Low 0 sec   Patient Ventilator Parameters   Resp Rate Total 22 br/min   Peak Airway Pressure 25 cmH2O   Mean Airway Pressure 12 cmH20   Exhaled Vt 526 mL   Total Ve 14.8 mL   Spont Ve 0 L   I:E Ratio Measured 1:2.00   Conventional Ventilator Alarms   Ve High Alarm 24 L/min   Resp Rate High Alarm 50 br/min   Press High Alarm 50 cmH2O   Apnea Rate 10   Apnea  Volume (mL) 500 mL   Apnea Oxygen Concentration  100   Apnea Flow Rate (L/min) 60   T Apnea 20 sec(s)   Pt on 840 ventilator continuously coughing, trach suctioned multiple times.  Trach site assessed.

## 2017-02-10 NOTE — PROGRESS NOTES
Contacted Dr Naylor to get duration of IV abx he will need at discharge; if the pt responds the IV abx he will need 10-14 days. We will re-evaluate on Monday if the pt is responding to the regimen then contact [facility] to see if they can take him back and provide the IV abx. If [facility] can not provide the IV abx needed [facility] can be considered....NORMA Jaeger CM

## 2017-02-10 NOTE — PROGRESS NOTES
Progress Note  PULMONARY    Admit Date: 2/2/2017  2/10/2017        SUBJECTIVE:     Feb 4, actually animated and following commands today.  No c/o or distress  Feb 5, not arousing much this am, no distress apparent  Feb 6, alert and seems to interact. No distress  Feb 7, stable bedside and no c/o  Feb 8, alert again today, no distress  Feb 9, alert again, looks stable.  Feb 10 no distress, arouses.    PFSH and Allergies reviewed.  Pt's neuro status prevents obtaining ros.    OBJECTIVE:     Vitals (Most recent):  Vitals:    02/10/17 1600   BP: 110/73   Pulse: 67   Resp: (!) 22   Temp:        Vitals (24 hour range):  Temp:  [98.9 °F (37.2 °C)-100 °F (37.8 °C)]   Pulse:  [65-82]   Resp:  [22-36]   BP: (102-139)/(61-80)   SpO2:  [93 %-98 %]       Intake/Output Summary (Last 24 hours) at 02/10/17 1633  Last data filed at 02/10/17 1600   Gross per 24 hour   Intake             1160 ml   Output             1335 ml   Net             -175 ml          Physical Exam:  The patient's neuro status (alertness,orientation,cognitive function,motor skills,), pharyngeal exam (oral lesions, hygiene, abn dentition,), Neck (jvd,mass,thyroid,nodes in neck and above/below clavicle),RESPIRATORY(symmetry,effort,fremitus,percussion,auscultation),  Cor(rhythm,heart tones including gallops,perfusion,edema)ABD(distention,hepatic&splenomegaly,tenderness,masses), Skin(rash,cyanosis),Psyc(affect,judgement,).  Exam negative except for these pertinent findings:    Alert, no distres.Trach, secretions controlled, no ecxess trach secretions, no edema, good bs.  Stable exam 2/10/2017  Results for KENNA ALEXIS SR. (MRN [id number]) as of 2/10/2017 16:31   Ref. Range 2/10/2017 03:29   WBC Latest Ref Range: 3.90 - 12.70 K/uL 9.10   RBC Latest Ref Range: 4.60 - 6.20 M/uL 3.64 (L)   Hemoglobin Latest Ref Range: 14.0 - 18.0 g/dL 9.6 (L)   Hematocrit Latest Ref Range: 40.0 - 54.0 % 29.7 (L)   MCV Latest Ref Range: 82 - 98 fL 82   MCH Latest Ref Range: 27.0  - 31.0 pg 26.4 (L)   MCHC Latest Ref Range: 32.0 - 36.0 % 32.4   RDW Latest Ref Range: 11.5 - 14.5 % 18.0 (H)   Platelets Latest Ref Range: 150 - 350 K/uL 318   MPV Latest Ref Range: 9.2 - 12.9 fL 8.3 (L)   Gran% Latest Ref Range: 38.0 - 73.0 % 53.1   Gran # Latest Ref Range: 1.8 - 7.7 K/uL 4.8   Lymph% Latest Ref Range: 18.0 - 48.0 % 37.4   Lymph # Latest Ref Range: 1.0 - 4.8 K/uL 3.4   Mono% Latest Ref Range: 4.0 - 15.0 % 7.7   Mono # Latest Ref Range: 0.3 - 1.0 K/uL 0.7   Eosinophil% Latest Ref Range: 0.0 - 8.0 % 1.6   Eos # Latest Ref Range: 0.0 - 0.5 K/uL 0.1   Basophil% Latest Ref Range: 0.0 - 1.9 % 0.2   Baso # Latest Ref Range: 0.00 - 0.20 K/uL 0.00   Sodium Latest Ref Range: 136 - 145 mmol/L 131 (L)   Potassium Latest Ref Range: 3.5 - 5.1 mmol/L 4.1   Chloride Latest Ref Range: 95 - 110 mmol/L 101   CO2 Latest Ref Range: 23 - 29 mmol/L 25   Anion Gap Latest Ref Range: 8 - 16 mmol/L 5 (L)   BUN, Bld Latest Ref Range: 8 - 23 mg/dL 24 (H)   Creatinine Latest Ref Range: 0.5 - 1.4 mg/dL 0.6   eGFR if non African American Latest Ref Range: >60 mL/min/1.73 m^2 >60   eGFR if  Latest Ref Range: >60 mL/min/1.73 m^2 >60   Glucose Latest Ref Range: 70 - 110 mg/dL 108   Calcium Latest Ref Range: 8.7 - 10.5 mg/dL 8.3 (L)   Alkaline Phosphatase Latest Ref Range: 55 - 135 U/L 132   Total Protein Latest Ref Range: 6.0 - 8.4 g/dL 7.9   Albumin Latest Ref Range: 3.5 - 5.2 g/dL 2.1 (L)   Total Bilirubin Latest Ref Range: 0.1 - 1.0 mg/dL 0.7   AST Latest Ref Range: 10 - 40 U/L 32       Diagnostic Results:  All labs last 4 entries of CBC,CMP/BMP,AGB, MICRO reviewed.  All rediographs of chest, lungs, & abd reviewed by direct vision last 3 days.     cxr 2/10 with no sig change.         Susceptibility       Serratia marcescens Escherichia coli esbl       CULTURE, RESPIRATORY CULTURE, RESPIRATORY       Amikacin <=16  Sensitive <=16  Sensitive       Amox/K Clav'ate  >16/8  Resistant       Amp/Sulbactam  >16/8   Resistant       Ampicillin  >16  Resistant       Cefazolin  >16  Resistant       Cefepime <=8  Sensitive >16  Resistant       Ceftriaxone <=8  Sensitive >32  Resistant       Ciprofloxacin >2  Resistant >2  Resistant       Ertapenem <=2  Sensitive >4  Resistant       Gentamicin <=4  Sensitive <=4  Sensitive       Meropenem <=4  Sensitive <=4  Sensitive       Piperacillin/Tazo <=16  Sensitive <=16  Sensitive       Tetracycline  >8  Resistant       Tobramycin >8  Resistant 8  Intermediate       Trimeth/Sulfa >2/38  Resistant >2/38  Resistant                      Specimen Collected: 02/02/17 10:28 AM Last Resulted: 02/06/17 12:57 PM Lab Flowsheet Order Details View             ASSESSMENT/PLAN:     Patient Active Problem List   Diagnosis    Aortic dissection distal to left subclavian    Hemiplegia as late effect of stroke    Tracheostomy in place    Functional quadriplegia    Jejunostomy tube in situ    Tracheo-esophageal fistula    Anemia, chronic disease    Pneumonia due to Escherichia coli    Acute on chronic respiratory failure with hypercapnia    Gastrostomy status    Protein-calorie malnutrition, moderate    Seizure disorder    History of CVA (cerebrovascular accident)    Anoxic brain injury    Aspiration into airway    Abnormal CXR    Malfunctioning jejunostomy tube    Acute cystitis with hematuria    Chronic respiratory failure    Transaminitis    Sepsis    HCAP (healthcare-associated pneumonia)     Specialty Problems        Pulmonary Problems    Tracheostomy in place        Tracheo-esophageal fistula        * (Principal)Pneumonia due to Escherichia coli        Acute on chronic respiratory failure with hypercapnia        Aspiration into airway        Chronic respiratory failure        Pneumonia due to infectious organism                  rec     Feb 6, looks good, alert, sourse fever not clear but wbc good,  cxr na- if cxr stable could go to [place] for abx completion - another 3  days    Drop vancomcyin soon with above culture.    Feb 7, remains stable with low grade temp, wbc better and abx appropriate  Will stop vanc.    Feb8 , temp back up with nl wbc,  Pt has ongoing aspiration with t e fistula palliated by stent.  Prolonged abx will not change fistula.      Would defer adjustment rx to ID, Dr Parra.  Would not manipulate trach for fear of fistula problems.  Trial anti pyretics?     Feb 9, day 7 abx, low grade temp. U/a ok.  c dif and leg study na.     No new recs.  Approaching adequate course of abx for lungs?   Will not manipulate fistula/trach. No new rec, continue rx/support.  Feb 10, Dr Parra note noted.  Temp down. abx per Dr Parra.  Please call if needed.            .

## 2017-02-10 NOTE — PLAN OF CARE
Problem: Nutrition, Enteral (Adult)  Goal: Signs and Symptoms of Listed Potential Problems Will be Absent, Minimized or Managed (Nutrition, Enteral)  Signs and symptoms of listed potential problems will be absent, minimized or managed by discharge/transition of care (reference Nutrition, Enteral (Adult) CPG).   Recommendation/Intervention: 1) Continue with Diabetasource AC @ goal rate 65 mls/hr continuous providing 1872 kcals/day, 94 g protein/day, 156 g CHO/day, and 1276 mls water/day. Needs assessed per [place]: 1803 kcals/day. Hold TF x4 hrs for residuals >250mls. Flush 90 mls Q4 hrs to meet fluid needs or per MD.   Goals: 1.) patient will recieve nutrition within 48 hrs. 2.) patient will tolerate feeds at goal rate within 72 hrs.   Nutrition Goal Status: 1.) goal met 2.) met/ongoing  Communication of RD Recs: Reviewed with RN

## 2017-02-10 NOTE — CONSULTS
[facility]  Adult Nutrition  Consult Note    SUMMARY     Recommendations    Recommendation/Intervention: 1) Continue with Diabetasource AC @  goal rate 65 mls/hr continuous providing 1872 kcals/day, 94 g protein/day, 156 g CHO/day, and 1276 mls water/day. Needs assessed per [place]: 1803 kcals/day. Hold TF x4 hrs for residuals >250mls. Flush 90 mls Q4 hrs to meet fluid needs or per MD.   Goals: 1.) patient will recieve nutrition within 48 hrs. 2.) patient will tolerate feeds at goal rate within 72 hrs.   Nutrition Goal Status: 1.) goal met 2.) met/ongoing  Communication of RD Recs: Reviewed with RN  1. Sepsis, due to unspecified organism    2. Pneumonia    3. HCAP (healthcare-associated pneumonia)    4. Acute on chronic respiratory failure with hypercapnia    5. Anoxic brain injury    6. Pneumonia of both lower lobes due to infectious organism    7. Pneumonia due to Escherichia coli      Past Medical History   Diagnosis Date    Anoxic brain injury 7/25/2015     during cardiac arrest    Chronic kidney disease     Decubitus ulcer of ankle, unstageable      outer right ankle    Decubitus ulcer of sacral region, stage 3     Decubitus ulcer, unstageable      left ear upper with blister to lower ear lobe    Diabetes mellitus      type II    Dissecting aneurysm of thoracoabdominal aorta 7/25/2015    Encounter for blood transfusion     H/O renal calculi     History of ATN      r/t hypoperfusion    Hyperlipidemia     Hypertension     Seizures     Severe anemia     Stroke 7/25/2015     bilateral watershed infarcts    Ventilator dependent      Reason for Assessment    Reason for Assessment: RD follow-up  Diagnosis:  (admits with trever from [place])    Interdisciplinary Rounds: attended  Level of Care: Nutrition Support       Nutrition Prescription Ordered    Current Diet Order: NPO     Current Nutrition Support Formula Ordered:  (Diabetasource AC)  Current Nutrition Support Rate  Ordered: 65 (ml)  Current Nutrition Support Frequency Ordered: 65 mls/hr        Evaluation of Received Nutrients/Fluid Intake    Enteral Calories (kcal): 1872  Enteral Protein (gm): 94  Enteral (Free Water) Fluid (mL): 1276 mls  Enteral CHO: 156 gms      Energy Calories Required: meeting needs      Protein Required: meeting needs     Comments: Pt tolerating TF at goal rate with 0 residuals.  Tolerance: tolerating     Nutrition Risk Screen     Nutrition Risk Screen: tube feeding or parenteral nutrition    Nutrition/Diet History     Typical Food/Fluid Intake:  (intubated)   Factors Affecting Nutritional Intake: on mechanical ventilation     Labs/Tests/Procedures/Meds    Diagnostic Test/Procedure Review: reviewed on mechanical ventilation  Pertinent Labs Reviewed: reviewed, pertinent      BMP  Lab Results   Component Value Date     (L) 02/10/2017    K 4.1 02/10/2017     02/10/2017    CO2 25 02/10/2017    BUN 24 (H) 02/10/2017    CREATININE 0.6 02/10/2017    CALCIUM 8.3 (L) 02/10/2017    ANIONGAP 5 (L) 02/10/2017    ESTGFRAFRICA >60 02/10/2017    EGFRNONAA >60 02/10/2017     No results for input(s): POCTGLUCOSE in the last 24 hours.  Lab Results   Component Value Date    HGBA1C 5.6 08/20/2016     No results found for: CHOL  No results found for: HDL  No results found for: LDLCALC  No results found for: TRIG  No results found for: CHOLHDL    Pertinent Medications Reviewed: reviewed      Scheduled Meds:   carvedilol  12.5 mg Per J Tube BID    ceFEPime (MAXIPIME) IVPB  2 g Intravenous Q12H    chlorhexidine  15 mL Mouth/Throat BID    enoxaparin  40 mg Subcutaneous Daily    ferrous sulfate  300 mg Per J Tube BID    glycopyrrolate  1 mg Per J Tube TID    Lactobacillus acidoph-L.bulgar  1 tablet Oral TID WM    levetiracetam oral soln  500 mg Per J Tube BID    pantoprazole  40 mg Intravenous Daily    polyethylene glycol  17 g Per J Tube Daily    rosuvastatin  5 mg Per J Tube Daily    tigecycline  (TYGACIL) IVPB  50 mg Intravenous Q12H     Continuous Infusions:   PRN Meds:.acetaminophen    Physical Findings    Overall Physical Appearance: on ventilator support  Tubes: gastrostomy tube, jejunostomy tube     Skin: pressure ulcer(s) (ilya score )    Anthropometrics  Height (inches): 69.02 in  Weight Method: Bed Scale  Weight (kg): 70.3 kg  Ideal Body Weight (IBW), Male: 160.12 lb  % Ideal Body Weight, Male (lb): 96.79 lb  BMI (kg/m2): 22.88  BMI Grade: 18.5-24.9 - normal        Estimated/Assessed Needs  Weight Used For Calorie Calculations: 70.3 kg (154 lb 15.7 oz)   Height (cm): 175.3 cm  Energy Need Method: [facility] (1803)  RMR (Coleman-St. Jeor Equation): 1483.12  Weight Used For Protein Calculations: 70.3 kg (154 lb 15.7 oz)  1.2 gm Protein (gm): 84.54 and 2.0 gm Protein (gm): 140.89  Fluid Need Method: RDA Method (or per MD)   Grams of CHO per day: 225 g max     Monitor and Evaluation    Food and Nutrient Intake: energy intake, enteral nutrition intake  Food and Nutrient Adminstration: diet order, enteral and parenteral nutrition administration   Anthropometric Measurements: weight, weight change  Biochemical Data, Medical Tests and Procedures: electrolyte and renal panel, glucose/endocrine profile, lipid profile  Nutrition-Focused Physical Findings: skin    Nutrition Risk    Level of Risk:  (Follow up 1 time weekly)    Nutrition Follow-Up    RD Follow-up?: Yes     Assessment and Plan    No new Assessment & Plan notes have been filed under this hospital service since the last note was generated.  Service: Nutrition

## 2017-02-10 NOTE — PHYSICIAN QUERY
"PT Name: Emilie Proctor Sr.  MR #: [id number]    Physician Query Form - Pneumonia Clarification    Reviewer  Ext [phone] Rebecca Chung RN CDS    This form is a permanent document in the medical record.    Query Date:  February 10, 2017    By submitting this query, we are merely seeking further clarification of documentation. Please utilize your independent clinical judgment when addressing the question(s) below.  (The Medical record reflects the following:)   Indicators   Supporting Clinical Findings Location in Medical Record   X "Pneumonia" documented Patient is a 66 y.o. male admitted to Hospitalist Service from [facility] with complaint of "Pneumonia H/P 2/2   X Chest X-Ray: Persistent bilateral pulmonary opacities are noted for which differential considerations include pneumonia and aspiration in this patient with history of esophageal stent. CXR 2/2   X PaO2=   PaCO2=    O2 sat= O2 sat % Flowsheet 2/2-2/10   X Cultures : Serratia marcescens   Escherichia coli esbl    Empiric antibiotic change to cefepime plus tygacil, to cover the ESBLs, prior acinetobacter and pseudmonas. Pulm PN 2/6       ID PN 2/9   X Treatment : ceFEPIme in dextrose 5% 2 gram/50 mL IVPB 2 g  :  Dose 2 g  :  100 mL/hr  :  Intravenous  :  Every 12 hours    meropenem-0.9% sodium chloride 1 g/50 mL IVPB  :  Dose 1 g  :  50 mL/hr  :  Intravenous  :  Every 12 hours (non-standard times)         tigecycline (TYGACIL) 50 mg in dextrose 5 % 100 mL IVPB  :  Dose 50 mg  :  200 mL/hr  :  Intravenous  :  Every 12 hours    MAR 2/2-2/10        MAR 2/3-2/9              MAR 2/10     X Supplemental O2: Vent Mode: A/C   Oxygen Concentration (%):  [30-40] 30   Resp Rate Total:  [22 br/min-36 br/min] 22 br/min   Vt Set:  [500 mL] 500 mL   PEEP/CPAP:  [5 cmH20-7 cmH20] 5 cmH20   Pressure Support:  [0 cmH20] 0 cmH20   Mean Airway Pressure:  [8.1 ftA05-89 cmH20] 8.1 cmH20  Hosp PN 2/4   X Other Patient has a temperature of 100.8 degrees F. " Patient was  sent over for further evaluation of possible aspiration.    Pt has ongoing aspiration with the fistula palliated by stent.   Aspiration into airway    EVOLUTION OF RIGHT UPPER LOBE PNEUMONIA DESPITE MEROPENEM   In light of the multiple resistant organisms that he is colonized with, any of the previous ones could be responsible for this new infiltrate. H/P 2/2         Pulm PN 2/8        ID PN 2/9           Provider, please specify type of pneumonia.    [x  ] Bacterial (Specify organism) PNA   Serratia, e coli,      [ x ] Bacterial, Gram Negative organism PNA        [  ] Viral (Specify virus) PNA       [  ] Fungal (Specify organism) PNA       [  x] Aspiration PNA        [  ] Other type of pneumonia (Specify)        [ ] Clinically undetermined    Please document in your progress notes daily for the duration of treatment, until resolved, and include in your discharge summary.

## 2017-02-10 NOTE — SIGNIFICANT EVENT
[id number] as of 2/10/2017 04:32   Ref. Range 2/10/2017 03:29   Sodium Latest Ref Range: 136 - 145 mmol/L 131 (L)     CHEO Butler NP notified

## 2017-02-11 LAB
ALBUMIN SERPL BCP-MCNC: 1.9 G/DL
ALP SERPL-CCNC: 124 U/L
ALT SERPL W/O P-5'-P-CCNC: 19 U/L
ANION GAP SERPL CALC-SCNC: 10 MMOL/L
AST SERPL-CCNC: 26 U/L
BACTERIA BLD CULT: NORMAL
BACTERIA BLD CULT: NORMAL
BASOPHILS # BLD AUTO: 0 K/UL
BASOPHILS NFR BLD: 0.4 %
BILIRUB SERPL-MCNC: 0.6 MG/DL
BUN SERPL-MCNC: 32 MG/DL
CALCIUM SERPL-MCNC: 8 MG/DL
CHLORIDE SERPL-SCNC: 101 MMOL/L
CO2 SERPL-SCNC: 21 MMOL/L
CREAT SERPL-MCNC: 0.7 MG/DL
DIFFERENTIAL METHOD: ABNORMAL
EOSINOPHIL # BLD AUTO: 0.2 K/UL
EOSINOPHIL NFR BLD: 1.8 %
ERYTHROCYTE [DISTWIDTH] IN BLOOD BY AUTOMATED COUNT: 17.9 %
EST. GFR  (AFRICAN AMERICAN): >60 ML/MIN/1.73 M^2
EST. GFR  (NON AFRICAN AMERICAN): >60 ML/MIN/1.73 M^2
GLUCOSE SERPL-MCNC: 103 MG/DL
HCT VFR BLD AUTO: 28 %
HGB BLD-MCNC: 9.4 G/DL
LYMPHOCYTES # BLD AUTO: 3.4 K/UL
LYMPHOCYTES NFR BLD: 35.2 %
MCH RBC QN AUTO: 26.8 PG
MCHC RBC AUTO-ENTMCNC: 33.6 %
MCV RBC AUTO: 80 FL
MONOCYTES # BLD AUTO: 0.7 K/UL
MONOCYTES NFR BLD: 7.3 %
NEUTROPHILS # BLD AUTO: 5.4 K/UL
NEUTROPHILS NFR BLD: 55.3 %
PLATELET # BLD AUTO: 241 K/UL
PMV BLD AUTO: 9 FL
POTASSIUM SERPL-SCNC: 4.2 MMOL/L
PROT SERPL-MCNC: 7.5 G/DL
RBC # BLD AUTO: 3.52 M/UL
SODIUM SERPL-SCNC: 132 MMOL/L
WBC # BLD AUTO: 9.8 K/UL

## 2017-02-11 PROCEDURE — 63600175 PHARM REV CODE 636 W HCPCS: Performed by: INTERNAL MEDICINE

## 2017-02-11 PROCEDURE — 27000221 HC OXYGEN, UP TO 24 HOURS

## 2017-02-11 PROCEDURE — 94770 HC EXHALED C02 TEST: CPT

## 2017-02-11 PROCEDURE — 63600175 PHARM REV CODE 636 W HCPCS: Performed by: NURSE PRACTITIONER

## 2017-02-11 PROCEDURE — 85025 COMPLETE CBC W/AUTO DIFF WBC: CPT

## 2017-02-11 PROCEDURE — C9113 INJ PANTOPRAZOLE SODIUM, VIA: HCPCS | Performed by: NURSE PRACTITIONER

## 2017-02-11 PROCEDURE — 36415 COLL VENOUS BLD VENIPUNCTURE: CPT

## 2017-02-11 PROCEDURE — 20000000 HC ICU ROOM

## 2017-02-11 PROCEDURE — 99900026 HC AIRWAY MAINTENANCE (STAT)

## 2017-02-11 PROCEDURE — 25000003 PHARM REV CODE 250: Performed by: INTERNAL MEDICINE

## 2017-02-11 PROCEDURE — 80053 COMPREHEN METABOLIC PANEL: CPT

## 2017-02-11 PROCEDURE — 94003 VENT MGMT INPAT SUBQ DAY: CPT

## 2017-02-11 PROCEDURE — 25000003 PHARM REV CODE 250: Performed by: HOSPITALIST

## 2017-02-11 PROCEDURE — 94761 N-INVAS EAR/PLS OXIMETRY MLT: CPT

## 2017-02-11 RX ADMIN — ROSUVASTATIN CALCIUM 5 MG: 5 TABLET, FILM COATED ORAL at 09:02

## 2017-02-11 RX ADMIN — POLYETHYLENE GLYCOL (3350) 17 G: 17 POWDER, FOR SOLUTION ORAL at 09:02

## 2017-02-11 RX ADMIN — PANTOPRAZOLE SODIUM 40 MG: 40 INJECTION, POWDER, FOR SOLUTION INTRAVENOUS at 09:02

## 2017-02-11 RX ADMIN — DEXTROSE MONOHYDRATE 50 MG: 5 INJECTION, SOLUTION INTRAVENOUS at 05:02

## 2017-02-11 RX ADMIN — CEFEPIME HYDROCHLORIDE 2 G: 2 INJECTION, SOLUTION INTRAVENOUS at 05:02

## 2017-02-11 RX ADMIN — ENOXAPARIN SODIUM 40 MG: 100 INJECTION SUBCUTANEOUS at 12:02

## 2017-02-11 RX ADMIN — LEVETIRACETAM 500 MG: 500 SOLUTION ORAL at 08:02

## 2017-02-11 RX ADMIN — MICONAZOLE NITRATE: 20 OINTMENT TOPICAL at 08:02

## 2017-02-11 RX ADMIN — GLYCOPYRROLATE 1 MG: 1 TABLET ORAL at 01:02

## 2017-02-11 RX ADMIN — LACTOBACILLUS TAB 1 TABLET: TAB at 05:02

## 2017-02-11 RX ADMIN — LEVETIRACETAM 500 MG: 500 SOLUTION ORAL at 09:02

## 2017-02-11 RX ADMIN — LACTOBACILLUS TAB 1 TABLET: TAB at 09:02

## 2017-02-11 RX ADMIN — CHLORHEXIDINE GLUCONATE 15 ML: 1.2 RINSE ORAL at 09:02

## 2017-02-11 RX ADMIN — CEFEPIME HYDROCHLORIDE 2 G: 2 INJECTION, SOLUTION INTRAVENOUS at 06:02

## 2017-02-11 RX ADMIN — MINERAL SUPPLEMENT IRON 300 MG / 5 ML STRENGTH LIQUID 100 PER BOX UNFLAVORED 300 MG: at 09:02

## 2017-02-11 RX ADMIN — GLYCOPYRROLATE 1 MG: 1 TABLET ORAL at 09:02

## 2017-02-11 RX ADMIN — MINERAL SUPPLEMENT IRON 300 MG / 5 ML STRENGTH LIQUID 100 PER BOX UNFLAVORED 300 MG: at 08:02

## 2017-02-11 RX ADMIN — LACTOBACILLUS TAB 1 TABLET: TAB at 12:02

## 2017-02-11 RX ADMIN — CARVEDILOL 12.5 MG: 6.25 TABLET, FILM COATED ORAL at 08:02

## 2017-02-11 RX ADMIN — CHLORHEXIDINE GLUCONATE 15 ML: 1.2 RINSE ORAL at 08:02

## 2017-02-11 RX ADMIN — CARVEDILOL 12.5 MG: 6.25 TABLET, FILM COATED ORAL at 09:02

## 2017-02-11 RX ADMIN — GLYCOPYRROLATE 1 MG: 1 TABLET ORAL at 05:02

## 2017-02-11 NOTE — PROGRESS NOTES
Progress Note  Infectious Disease    Admit Date: 2/2/2017   LOS: 9 days     SUBJECTIVE:     Follow-up For:  Aspiration pneumonia     Antibiotics     Start     Stop Route Frequency Ordered    02/09/17 1830  ceFEPIme in dextrose 5% 2 gram/50 mL IVPB 2 g      -- IV Every 12 hours (non-standard times) 02/09/17 1746    02/10/17 0600  tigecycline (TYGACIL) 50 mg in dextrose 5 % 100 mL IVPB      -- IV Every 12 hours (non-standard times) 02/09/17 1746          Review of Systems:  Unable to obtain     OBJECTIVE:     Vital Signs (Most Recent)  Temp: 99.2 °F (37.3 °C) (02/10/17 2320)  Pulse: 71 (02/11/17 0000)  Resp: (!) 22 (02/11/17 0000)  BP: 115/68 (02/11/17 0000)  SpO2: 96 % (02/11/17 0000)    Temperature Range Min/Max (Last 24H):  Temp:  [98.5 °F (36.9 °C)-99.2 °F (37.3 °C)]     I & O (Last 24H):  Intake/Output Summary (Last 24 hours) at 02/11/17 0034  Last data filed at 02/10/17 2320   Gross per 24 hour   Intake             1650 ml   Output             1660 ml   Net              -10 ml       Physical Exam:  Unresponive. Sleeping, tracheostomy   rrr no m//g/r   Lungs cta anteriorly   abd soft, peg, + bs   Upper and lower extremity contractures     Lines/Drains:       Peripheral IV - Single Lumen 02/10/17 1915 Right Hand (Active)   Site Assessment Clean;Dry;Intact 2/10/2017 11:20 PM   Line Status Blood return noted;Flushed;Infusing 2/10/2017 11:20 PM   Dressing Status Clean;Dry;Intact 2/10/2017 11:20 PM       Laboratory:  CBC    Recent Labs  Lab 02/10/17  0329   WBC 9.10   RBC 3.64*   HGB 9.6*   HCT 29.7*      MCV 82   MCH 26.4*   MCHC 32.4     BMP    Recent Labs  Lab 02/10/17  0329   *   K 4.1      CO2 25   BUN 24*   CREATININE 0.6   CALCIUM 8.3*     Microbiology Results (last 7 days)     Procedure Component Value Units Date/Time    C Diff Toxin by PCR [[id number]] Collected:  02/12/17 1415    Order Status:  No result Updated:  02/12/17 2143    Clostridium difficile EIA [[id number]]  (Abnormal) Collected:   02/12/17 1415    Order Status:  Completed Specimen:  Stool from Stool Updated:  02/12/17 2143     C. diff Antigen Positive (A)     C difficile Toxins A+B, EIA Negative      Testing not recommended for children <24 months old.       Blood culture [[id number]] Collected:  02/06/17 0601    Order Status:  Completed Specimen:  Blood from Peripheral, Left  Hand Updated:  02/11/17 1212     Blood Culture, Routine No growth after 5 days.    Blood culture [[id number]] Collected:  02/06/17 0628    Order Status:  Completed Specimen:  Blood from Peripheral, Left  Hand Updated:  02/11/17 1212     Blood Culture, Routine No growth after 5 days.    Urine culture [[id number]] Collected:  02/08/17 1843    Order Status:  Completed Specimen:  Urine from Urine, Catheterized Updated:  02/10/17 0716     Urine Culture, Routine No growth    Blood culture [[id number]] Collected:  02/03/17 1830    Order Status:  Completed Specimen:  Blood Updated:  02/09/17 0612     Blood Culture, Routine No growth after 5 days.    Blood culture #1 **CANNOT BE ORDERED STAT** [[id number]] Collected:  02/02/17 1613    Order Status:  Completed Specimen:  Blood Updated:  02/07/17 2322     Blood Culture, Routine No growth after 5 days.    Blood culture #2 **CANNOT BE ORDERED STAT** [[id number]] Collected:  02/02/17 1647    Order Status:  Completed Specimen:  Blood Updated:  02/07/17 2322     Blood Culture, Routine No growth after 5 days.    Blood culture [[id number]]     Order Status:  Canceled Specimen:  Blood     Narrative:       Culture Blood was cancelled on 02/06/2017 at 06:02 by [facility]; Duplicate   order    Blood culture [[id number]]     Order Status:  Canceled Specimen:  Blood     Narrative:       Culture Blood was cancelled on 02/06/2017 at 06:02 by [facility]; Duplicate   order          Diagnostic Results:  cxr is slightly better     ASSESSMENT/PLAN:     Active Hospital Problems    Diagnosis  POA    *Pneumonia due to Escherichia coli [J15.5]  Yes    Pneumonia of both  lower lobes due to infectious organism [J18.9]  Yes    Sepsis [A41.9]  Yes    Chronic respiratory failure [J96.10]  Yes    Gastrostomy status [Z93.1]  Not Applicable    Protein-calorie malnutrition, moderate [E44.0]  Yes    Anoxic brain injury [G93.1]  Yes    History of CVA (cerebrovascular accident) [Z86.73]  Not Applicable    Seizure disorder [G40.909]  Yes    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     - Continue Cefepime and Tigecycline to cover past pathogens   - cxr is slightly better. Will see if [facility] will take him back with current abx, if not, will have to adjust abx.

## 2017-02-11 NOTE — PLAN OF CARE
Problem: Patient Care Overview  Goal: Plan of Care Review  Outcome: Ongoing (interventions implemented as appropriate)  Pt has remained free from falls/injuries this shift. Pt remains afebrile w/ 98.9 degree F temp noted. Pt had several coughing episodes while awake w/ moderate thick white sputum suctioned from trach. Contact precautions maintained, pt's brother informed of these precautions while visiting today. Pt tolerating TF well, no residuals today. Plan is to re-evaluate pt on Monday for transfer back to [facility] or to LT[facility].

## 2017-02-12 LAB
ALBUMIN SERPL BCP-MCNC: 1.9 G/DL
ALP SERPL-CCNC: 124 U/L
ALT SERPL W/O P-5'-P-CCNC: 19 U/L
ANION GAP SERPL CALC-SCNC: 7 MMOL/L
AST SERPL-CCNC: 25 U/L
BASOPHILS # BLD AUTO: 0 K/UL
BASOPHILS NFR BLD: 0.4 %
BILIRUB SERPL-MCNC: 0.8 MG/DL
BUN SERPL-MCNC: 31 MG/DL
C DIFF GDH STL QL: POSITIVE
C DIFF TOX A+B STL QL IA: NEGATIVE
CALCIUM SERPL-MCNC: 8.1 MG/DL
CHLORIDE SERPL-SCNC: 100 MMOL/L
CO2 SERPL-SCNC: 24 MMOL/L
CREAT SERPL-MCNC: 0.7 MG/DL
DIFFERENTIAL METHOD: ABNORMAL
EOSINOPHIL # BLD AUTO: 0.1 K/UL
EOSINOPHIL NFR BLD: 1.4 %
ERYTHROCYTE [DISTWIDTH] IN BLOOD BY AUTOMATED COUNT: 17.9 %
EST. GFR  (AFRICAN AMERICAN): >60 ML/MIN/1.73 M^2
EST. GFR  (NON AFRICAN AMERICAN): >60 ML/MIN/1.73 M^2
GLUCOSE SERPL-MCNC: 88 MG/DL
HCT VFR BLD AUTO: 27.9 %
HGB BLD-MCNC: 9 G/DL
LYMPHOCYTES # BLD AUTO: 3.6 K/UL
LYMPHOCYTES NFR BLD: 40.8 %
MCH RBC QN AUTO: 26.1 PG
MCHC RBC AUTO-ENTMCNC: 32.3 %
MCV RBC AUTO: 81 FL
MONOCYTES # BLD AUTO: 0.7 K/UL
MONOCYTES NFR BLD: 7.4 %
NEUTROPHILS # BLD AUTO: 4.4 K/UL
NEUTROPHILS NFR BLD: 50 %
PLATELET # BLD AUTO: 307 K/UL
PMV BLD AUTO: 8.1 FL
POTASSIUM SERPL-SCNC: 4 MMOL/L
PROT SERPL-MCNC: 7.6 G/DL
RBC # BLD AUTO: 3.45 M/UL
SODIUM SERPL-SCNC: 131 MMOL/L
WBC # BLD AUTO: 8.9 K/UL

## 2017-02-12 PROCEDURE — 02HV33Z INSERTION OF INFUSION DEVICE INTO SUPERIOR VENA CAVA, PERCUTANEOUS APPROACH: ICD-10-PCS | Performed by: HOSPITALIST

## 2017-02-12 PROCEDURE — 25000003 PHARM REV CODE 250: Performed by: INTERNAL MEDICINE

## 2017-02-12 PROCEDURE — 63600175 PHARM REV CODE 636 W HCPCS: Performed by: INTERNAL MEDICINE

## 2017-02-12 PROCEDURE — 94761 N-INVAS EAR/PLS OXIMETRY MLT: CPT

## 2017-02-12 PROCEDURE — 85025 COMPLETE CBC W/AUTO DIFF WBC: CPT

## 2017-02-12 PROCEDURE — 27000221 HC OXYGEN, UP TO 24 HOURS

## 2017-02-12 PROCEDURE — 80053 COMPREHEN METABOLIC PANEL: CPT

## 2017-02-12 PROCEDURE — 25000003 PHARM REV CODE 250: Performed by: HOSPITALIST

## 2017-02-12 PROCEDURE — 36415 COLL VENOUS BLD VENIPUNCTURE: CPT

## 2017-02-12 PROCEDURE — 20000000 HC ICU ROOM

## 2017-02-12 PROCEDURE — C9113 INJ PANTOPRAZOLE SODIUM, VIA: HCPCS | Performed by: NURSE PRACTITIONER

## 2017-02-12 PROCEDURE — 63600175 PHARM REV CODE 636 W HCPCS: Performed by: NURSE PRACTITIONER

## 2017-02-12 PROCEDURE — 87493 C DIFF AMPLIFIED PROBE: CPT

## 2017-02-12 PROCEDURE — 94003 VENT MGMT INPAT SUBQ DAY: CPT

## 2017-02-12 PROCEDURE — C1751 CATH, INF, PER/CENT/MIDLINE: HCPCS

## 2017-02-12 PROCEDURE — 94770 HC EXHALED C02 TEST: CPT

## 2017-02-12 PROCEDURE — 87449 NOS EACH ORGANISM AG IA: CPT

## 2017-02-12 PROCEDURE — 25000003 PHARM REV CODE 250: Performed by: NURSE PRACTITIONER

## 2017-02-12 PROCEDURE — 99900022

## 2017-02-12 PROCEDURE — 36569 INSJ PICC 5 YR+ W/O IMAGING: CPT

## 2017-02-12 PROCEDURE — 76937 US GUIDE VASCULAR ACCESS: CPT

## 2017-02-12 RX ORDER — METRONIDAZOLE 500 MG/100ML
500 INJECTION, SOLUTION INTRAVENOUS
Status: DISCONTINUED | OUTPATIENT
Start: 2017-02-12 | End: 2017-02-13

## 2017-02-12 RX ADMIN — GLYCOPYRROLATE 1 MG: 1 TABLET ORAL at 09:02

## 2017-02-12 RX ADMIN — CEFEPIME HYDROCHLORIDE 2 G: 2 INJECTION, SOLUTION INTRAVENOUS at 08:02

## 2017-02-12 RX ADMIN — DEXTROSE MONOHYDRATE 50 MG: 5 INJECTION, SOLUTION INTRAVENOUS at 05:02

## 2017-02-12 RX ADMIN — DEXTROSE MONOHYDRATE 50 MG: 5 INJECTION, SOLUTION INTRAVENOUS at 08:02

## 2017-02-12 RX ADMIN — MICONAZOLE NITRATE: 20 OINTMENT TOPICAL at 09:02

## 2017-02-12 RX ADMIN — MINERAL SUPPLEMENT IRON 300 MG / 5 ML STRENGTH LIQUID 100 PER BOX UNFLAVORED 300 MG: at 09:02

## 2017-02-12 RX ADMIN — GLYCOPYRROLATE 1 MG: 1 TABLET ORAL at 05:02

## 2017-02-12 RX ADMIN — ACETAMINOPHEN 650 MG: 160 SOLUTION ORAL at 02:02

## 2017-02-12 RX ADMIN — LEVETIRACETAM 500 MG: 500 SOLUTION ORAL at 09:02

## 2017-02-12 RX ADMIN — GLYCOPYRROLATE 1 MG: 1 TABLET ORAL at 02:02

## 2017-02-12 RX ADMIN — CARVEDILOL 12.5 MG: 6.25 TABLET, FILM COATED ORAL at 09:02

## 2017-02-12 RX ADMIN — LACTOBACILLUS TAB 1 TABLET: TAB at 12:02

## 2017-02-12 RX ADMIN — PANTOPRAZOLE SODIUM 40 MG: 40 INJECTION, POWDER, FOR SOLUTION INTRAVENOUS at 09:02

## 2017-02-12 RX ADMIN — ENOXAPARIN SODIUM 40 MG: 100 INJECTION SUBCUTANEOUS at 12:02

## 2017-02-12 RX ADMIN — LACTOBACILLUS TAB 1 TABLET: TAB at 08:02

## 2017-02-12 RX ADMIN — METRONIDAZOLE 500 MG: 500 INJECTION, SOLUTION INTRAVENOUS at 10:02

## 2017-02-12 RX ADMIN — CHLORHEXIDINE GLUCONATE 15 ML: 1.2 RINSE ORAL at 09:02

## 2017-02-12 RX ADMIN — LACTOBACILLUS TAB 1 TABLET: TAB at 09:02

## 2017-02-12 RX ADMIN — CEFEPIME HYDROCHLORIDE 2 G: 2 INJECTION, SOLUTION INTRAVENOUS at 06:02

## 2017-02-12 RX ADMIN — ROSUVASTATIN CALCIUM 5 MG: 5 TABLET, FILM COATED ORAL at 09:02

## 2017-02-12 RX ADMIN — POLYETHYLENE GLYCOL (3350) 17 G: 17 POWDER, FOR SOLUTION ORAL at 09:02

## 2017-02-12 NOTE — PROGRESS NOTES
Call received from pt's son Emilie Proctor Jr-  explained to him about the order for PICC insertion,with necessity, options for use and possible side effects from insertion of PICC. Phone consent received from pt's son Emilie to insert PICC line; witnessed by KOURTNEY Womack RN.

## 2017-02-12 NOTE — PLAN OF CARE
Problem: Patient Care Overview  Goal: Plan of Care Review  Outcome: Ongoing (interventions implemented as appropriate)  Sleeping off and on today.  Awakens easily to stimuli.  Answered simple yes/no questions with a very slight nod of the head.  Has denied pain all day when asked.  Bed has remained on rotation mode.  Tolerating tube feeding via J-tube with minimal residual. Late this afternoon, passed LARGE amount of soft unformed stool.  Perineal care given with underpads changed, skin washed well and clear moisture barrier cream applied.  Repositioned and rotation mode resumed. G-tube drainage of 150cc.

## 2017-02-12 NOTE — PROCEDURES
"Emilie Proctor Sr. is a 66 y.o. male patient.    Temp: 99 °F (37.2 °C) (02/12/17 0800)  Pulse: 71 (02/12/17 0900)  Resp: (!) 23 (02/12/17 0900)  BP: 116/73 (02/12/17 0900)  SpO2: 96 % (02/12/17 0900)  Weight: 67.5 kg (148 lb 13 oz) (02/09/17 0600)  Height: 5' 9" (175.3 cm) (02/02/17 2001)    PICC  Date/Time: 2/12/2017 1:40 PM  Performed by: MARA FORD  Pre-operative Diagnosis: pnuemonia  Post-operative diagnosis: pneumonia  Consent Done: Yes  Time out: Immediately prior to procedure a time out was called to verify the correct patient, procedure, equipment, support staff and site/side marked as required  Indications: med administration  Anesthesia: local infiltration  Local anesthetic: lidocaine 1% without epinephrine  Anesthetic Total (mL): 1.5  Preparation: skin prepped with ChloraPrep  Skin prep agent dried: skin prep agent completely dried prior to procedure  Sterile barriers: all five maximum sterile barriers used - cap, mask, sterile gown, sterile gloves, and large sterile sheet  Hand hygiene: hand hygiene performed prior to central venous catheter insertion  Location details: left basilic  Catheter type: double lumen  Catheter size: 5 Fr  Catheter Length: 48cm    Ultrasound guidance: yes  Vessel Caliber: large and patent, compressibility normal  Vascular Doppler: not done  Number of attempts: 1  Post-procedure: blood return through all ports and sterile dressing applied (biopatch put in place at insertion site)  Specimens: No  Implants: No  Assessment: placement verified by x-ray  Complications: none        Mara Ford  2/12/2017  "

## 2017-02-12 NOTE — PROGRESS NOTES
"Progress Note  [facility]    Admit Date: 2/2/2017    SUBJECTIVE:     Follow-up For:  Pneumonia due to Escherichia coli      Interval history (See H&P for complete P,F,SHx) : Patient remains at neurologic baseline on vent. Vent settings stable. No events overnight.    Review of Systems: List if applicable  Review of Systems   Unable to perform ROS: Patient nonverbal       OBJECTIVE:     Vital Signs Range (Last 24H):  Temp:  [98.3 °F (36.8 °C)-99.2 °F (37.3 °C)]   Pulse:  [63-83]   Resp:  [0-71]   BP: (100-160)/(58-80)   SpO2:  [92 %-97 %]     I & O (Last 24H):    Intake/Output Summary (Last 24 hours) at 02/11/17 2350  Last data filed at 02/11/17 2315   Gross per 24 hour   Intake             1375 ml   Output             1510 ml   Net             -135 ml       Estimated body mass index is 21.98 kg/(m^2) as calculated from the following:    Height as of this encounter: 5' 9" (1.753 m).    Weight as of this encounter: 67.5 kg (148 lb 13 oz).    Physical Exam   Constitutional:   Chronically ill, malnourished, contracted patient who is nonverbal   HENT:   Nose: Nose normal.   Mouth/Throat: Oropharynx is clear and moist.   Eyes:   Pupils constricted and poorly responsive   Neck: Neck supple. No thyromegaly present.   Trach in place with ventilator attached.   Cardiovascular: Regular rhythm.  Exam reveals no gallop and no friction rub.    Murmur heard.  Tachycardic with murmur   Pulmonary/Chest: No respiratory distress. He has no wheezes.   Increased effort. Breath sounds coarse on ventilator.   Abdominal: Soft. He exhibits no distension. There is no tenderness. There is no guarding.   Noted J tube/ G tube with intact sites.   Musculoskeletal: He exhibits edema and deformity. He exhibits no tenderness.   Chronically contracted extremities   Neurological: He is alert. He displays abnormal reflex. A cranial nerve deficit is present. He exhibits abnormal muscle tone. Coordination abnormal.   Chronic contracted " extremities. Nonverbal in chronic comatose state   Skin: No rash noted. He is not diaphoretic. No erythema.   Multiple skin breakdown sites identified.   Nursing note and vitals reviewed.        Laboratory/Diagnostic Data:  Reviewed and noted in plan where applicable- Please see chart for full lab data.    Medications:  Medication list was reviewed and changes noted under Assessment/Plan.    ASSESSMENT/PLAN:     Active Problems:    Active Hospital Problems    Diagnosis  POA    *HCAP (healthcare-associated pneumonia) [J18.9]-  Continue broad spectrum ABX + h/o ESBL x7-10 days. Reduced FiO2 to baseline from NH. Continue nebs. ID consulted. Poor overall prognosis.  Yes    Sepsis [A41.9]-  S/p fluids. No S/Sx of shock. Continue Broad spectrum ABX with h/o MDRO.  Yes    Chronic respiratory failure [J96.10]- Adjusted vent, and restore chronic settings-   Vent Mode: A/C  Oxygen Concentration (%):  [30] 30  Resp Rate Total:  [22 br/min-35 br/min] 22 br/min  Vt Set:  [500 mL] 500 mL  PEEP/CPAP:  [5 cmH20] 5 cmH20  Pressure Support:  [0 cmH20] 0 cmH20  Mean Airway Pressure:  [11 gvO42-98 cmH20] 11 cmH20    Yes    Gastrostomy status [Z93.1]-  Routine care  Not Applicable    Protein-calorie malnutrition, moderate [E44.0]-  Continue tube feeds.  Yes    Anoxic brain injury [G93.1]-  Likely very poor short term prognosis. Would speak with family if available.  Yes    History of CVA (cerebrovascular accident) [Z86.73]  Not Applicable    Seizure disorder [G40.909]-  Continue keppra.  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   Yes    Tracheostomy in place [Z93.0]-  Routine care  Not Applicable    Functional quadriplegia [R53.2]-  No functional quality of life or ability to improve.  Yes    Jejunostomy tube in situ [Z93.4]-  Routine care  Not Applicable      Resolved Hospital Problems    Diagnosis Date Resolved POA   No resolved problems to display.       Disposition- SNF    VTE Risk  Mitigation         Ordered     enoxaparin injection 40 mg  Daily     Route:  Subcutaneous        02/02/17 1947     Low Risk of VTE  Once      02/02/17 1947        Time spent in critical care (in addition to E/M time mentioned above) Time spent to titrate meds, adjust ventilator settings, and provide counseling and coordination to critical care team 35 min

## 2017-02-12 NOTE — PLAN OF CARE
Problem: Patient Care Overview  Goal: Plan of Care Review  Outcome: Ongoing (interventions implemented as appropriate)  Patient remains on  on ac rate 22, , Peep +5 and fi02 .3.  Hr 68       , ETC02 30mmhg and 02 saturation 93%.  Patient trached via 7 extra long shiley with [id number] cuff pis.  Ambu bag and mask at bedside with alarms on and functioning properly at this time.

## 2017-02-12 NOTE — PROGRESS NOTES
02/11/17 1857   Patient Assessment/Suction   Level of Consciousness (AVPU) responds to voice   Respiratory Effort Unlabored   Expansion/Accessory Muscles/Retractions no use of accessory muscles;no retractions   All Lung Fields Breath Sounds coarse;diminished   PRE-TX-O2-ETCO2   O2 Device (Oxygen Therapy) ventilator   $ Is the patient on Oxygen? Yes   Oxygen Concentration (%) 30   SpO2 96 %   Pulse Oximetry Type Continuous   ETCO2 (mmHg) 29 mmHg   Pulse 66   Resp (!) 22       Surgical Airway Shiley Long;Distal;Cuffed   No Placement Date or Time found.   Present Prior to Hospital Arrival?: Yes  Type: Tracheostomy  Brand: [employer]  Airway Device Size: 7.0  Style: Long;Distal;Cuffed   Cuff Pressure 30 cm H2O   Status Secured   Site Assessment Clean   Ties Assessment Clean   Respiratory Interventions   Cough And Deep Breathing unable to perform   Airway/Ventilation Management airway patency maintained;pulmonary hygiene promoted   Airway/Ventilation Management (Actively Dying Patient) pulmonary hygiene promoted   Respiratory Support airway management;HOB elevated   VAP Prevention HOB elevation maintained;vent circuit breaks minimized   Vent Select   Conventional Vent Y   IHI Ventilator Associated Pneumonia Bundle   Daily Awakening Trials Performed Not applicable   Head of Bed Elevated  HOB 30   Oral Care Mouth suctioned   Additional VAP Prevention Documentation Clean equipment maintained   Preset Conventional Ventilator Settings   Vent Type    Ventilation Type VC   Vent Mode A/C   Humidity HME   Set Rate 22 bmp   Vt Set 500 mL   PEEP/CPAP 5 cmH20   Pressure Support 0 cmH20   Waveform RAMP   Peak Flow 75 L/min   Set Inspiratory Pressure 0 cmH20   Insp Time 0 Sec(s)   Plateau Set/Insp. Hold (sec) 0   Insp Rise Time  0 %   Trigger Sensitivity Flow/I-Trigger 1.5 L/min   P High 0 cm H2O   P Low 0 cm H2O   T High 0 sec   T Low 0 sec   Patient Ventilator Parameters   Resp Rate Total 22 br/min   Peak Airway Pressure 28 cmH2O    Mean Airway Pressure 11 cmH20   Exhaled Vt 515 mL   Total Ve 11.3 mL   Spont Ve 0 L   I:E Ratio Measured 1:2.80   Conventional Ventilator Alarms   Alarms On Y   Ve High Alarm 24 L/min   Resp Rate High Alarm 53 br/min   Press High Alarm 60 cmH2O   Apnea Rate 10   Apnea Volume (mL) 500 mL   Apnea Oxygen Concentration  100   Apnea Flow Rate (L/min) 60   T Apnea 20 sec(s)

## 2017-02-13 LAB
ALBUMIN SERPL BCP-MCNC: 1.9 G/DL
ALP SERPL-CCNC: 119 U/L
ALT SERPL W/O P-5'-P-CCNC: 16 U/L
ANION GAP SERPL CALC-SCNC: 7 MMOL/L
AST SERPL-CCNC: 21 U/L
BASOPHILS # BLD AUTO: 0 K/UL
BASOPHILS NFR BLD: 0.3 %
BILIRUB SERPL-MCNC: 1.1 MG/DL
BUN SERPL-MCNC: 27 MG/DL
C DIFF TOX GENS STL QL NAA+PROBE: NEGATIVE
CALCIUM SERPL-MCNC: 8.2 MG/DL
CHLORIDE SERPL-SCNC: 102 MMOL/L
CO2 SERPL-SCNC: 24 MMOL/L
CREAT SERPL-MCNC: 0.7 MG/DL
DIFFERENTIAL METHOD: ABNORMAL
EOSINOPHIL # BLD AUTO: 0.2 K/UL
EOSINOPHIL NFR BLD: 2.9 %
ERYTHROCYTE [DISTWIDTH] IN BLOOD BY AUTOMATED COUNT: 17.5 %
EST. GFR  (AFRICAN AMERICAN): >60 ML/MIN/1.73 M^2
EST. GFR  (NON AFRICAN AMERICAN): >60 ML/MIN/1.73 M^2
GLUCOSE SERPL-MCNC: 86 MG/DL
HCT VFR BLD AUTO: 28 %
HGB BLD-MCNC: 9 G/DL
LYMPHOCYTES # BLD AUTO: 3.2 K/UL
LYMPHOCYTES NFR BLD: 41.9 %
MCH RBC QN AUTO: 26.1 PG
MCHC RBC AUTO-ENTMCNC: 32 %
MCV RBC AUTO: 82 FL
MONOCYTES # BLD AUTO: 0.6 K/UL
MONOCYTES NFR BLD: 7.7 %
NEUTROPHILS # BLD AUTO: 3.6 K/UL
NEUTROPHILS NFR BLD: 47.2 %
PLATELET # BLD AUTO: 324 K/UL
PMV BLD AUTO: 8.2 FL
POTASSIUM SERPL-SCNC: 3.7 MMOL/L
PROT SERPL-MCNC: 7.7 G/DL
RBC # BLD AUTO: 3.43 M/UL
SODIUM SERPL-SCNC: 133 MMOL/L
WBC # BLD AUTO: 7.7 K/UL

## 2017-02-13 PROCEDURE — 85025 COMPLETE CBC W/AUTO DIFF WBC: CPT

## 2017-02-13 PROCEDURE — 94761 N-INVAS EAR/PLS OXIMETRY MLT: CPT

## 2017-02-13 PROCEDURE — C9113 INJ PANTOPRAZOLE SODIUM, VIA: HCPCS | Performed by: NURSE PRACTITIONER

## 2017-02-13 PROCEDURE — 94003 VENT MGMT INPAT SUBQ DAY: CPT

## 2017-02-13 PROCEDURE — 99231 SBSQ HOSP IP/OBS SF/LOW 25: CPT | Mod: ,,, | Performed by: INTERNAL MEDICINE

## 2017-02-13 PROCEDURE — 80053 COMPREHEN METABOLIC PANEL: CPT

## 2017-02-13 PROCEDURE — 25000003 PHARM REV CODE 250: Performed by: INTERNAL MEDICINE

## 2017-02-13 PROCEDURE — 99232 SBSQ HOSP IP/OBS MODERATE 35: CPT | Mod: ,,, | Performed by: INTERNAL MEDICINE

## 2017-02-13 PROCEDURE — 63600175 PHARM REV CODE 636 W HCPCS: Performed by: INTERNAL MEDICINE

## 2017-02-13 PROCEDURE — 63600175 PHARM REV CODE 636 W HCPCS: Performed by: NURSE PRACTITIONER

## 2017-02-13 PROCEDURE — 36415 COLL VENOUS BLD VENIPUNCTURE: CPT

## 2017-02-13 PROCEDURE — 94770 HC EXHALED C02 TEST: CPT

## 2017-02-13 PROCEDURE — 25000003 PHARM REV CODE 250: Performed by: HOSPITALIST

## 2017-02-13 PROCEDURE — 20000000 HC ICU ROOM

## 2017-02-13 PROCEDURE — 27000221 HC OXYGEN, UP TO 24 HOURS

## 2017-02-13 RX ADMIN — LEVETIRACETAM 500 MG: 500 SOLUTION ORAL at 09:02

## 2017-02-13 RX ADMIN — GLYCOPYRROLATE 1 MG: 1 TABLET ORAL at 09:02

## 2017-02-13 RX ADMIN — CHLORHEXIDINE GLUCONATE 15 ML: 1.2 RINSE ORAL at 09:02

## 2017-02-13 RX ADMIN — DEXTROSE MONOHYDRATE 50 MG: 5 INJECTION, SOLUTION INTRAVENOUS at 05:02

## 2017-02-13 RX ADMIN — ROSUVASTATIN CALCIUM 5 MG: 5 TABLET, FILM COATED ORAL at 09:02

## 2017-02-13 RX ADMIN — CEFEPIME HYDROCHLORIDE 2 G: 2 INJECTION, SOLUTION INTRAVENOUS at 05:02

## 2017-02-13 RX ADMIN — GLYCOPYRROLATE 1 MG: 1 TABLET ORAL at 01:02

## 2017-02-13 RX ADMIN — MICONAZOLE NITRATE: 20 OINTMENT TOPICAL at 09:02

## 2017-02-13 RX ADMIN — ENOXAPARIN SODIUM 40 MG: 100 INJECTION SUBCUTANEOUS at 01:02

## 2017-02-13 RX ADMIN — GLYCOPYRROLATE 1 MG: 1 TABLET ORAL at 05:02

## 2017-02-13 RX ADMIN — CARVEDILOL 12.5 MG: 6.25 TABLET, FILM COATED ORAL at 09:02

## 2017-02-13 RX ADMIN — LACTOBACILLUS TAB 1 TABLET: TAB at 01:02

## 2017-02-13 RX ADMIN — MINERAL SUPPLEMENT IRON 300 MG / 5 ML STRENGTH LIQUID 100 PER BOX UNFLAVORED 300 MG: at 09:02

## 2017-02-13 RX ADMIN — LACTOBACILLUS TAB 1 TABLET: TAB at 09:02

## 2017-02-13 RX ADMIN — METRONIDAZOLE 500 MG: 500 INJECTION, SOLUTION INTRAVENOUS at 06:02

## 2017-02-13 RX ADMIN — LACTOBACILLUS TAB 1 TABLET: TAB at 05:02

## 2017-02-13 RX ADMIN — PANTOPRAZOLE SODIUM 40 MG: 40 INJECTION, POWDER, FOR SOLUTION INTRAVENOUS at 09:02

## 2017-02-13 NOTE — PROGRESS NOTES
02/13/17 0650   Patient Assessment/Suction   Level of Consciousness (AVPU) responds to voice   All Lung Fields Breath Sounds coarse   Rhythm/Pattern, Respiratory ventilator assisted   Cough Frequency with stimulation   Cough Type good   Suction Method in-line suction catheter (closed);tracheostomy   Sputum Amount scant   Sputum Color white   Sputum Consistency thick   PRE-TX-O2-ETCO2   O2 Device (Oxygen Therapy) ventilator   $ Is the patient on Oxygen? Yes   Oxygen Concentration (%) 30   SpO2 98 %   Pulse Oximetry Type Continuous   $ Pulse Oximetry - Multiple Charge Pulse Oximetry - Multiple   ETCO2 (mmHg) 26 mmHg   Pulse (!) 59   Resp (!) 22   Preset Conventional Ventilator Settings   Ventilation Type VC   Vent Mode A/C   Set Rate 22 bmp   Vt Set 500 mL   PEEP/CPAP 5 cmH20   Pressure Support 0 cmH20   Waveform RAMP   Peak Flow 75 L/min   Set Inspiratory Pressure 0 cmH20   Insp Time 0 Sec(s)   Plateau Set/Insp. Hold (sec) 0   Insp Rise Time  0 %   Trigger Sensitivity Flow/I-Trigger 1.5 L/min   P High 0 cm H2O   P Low 0 cm H2O   T High 0 sec   T Low 0 sec   Patient Ventilator Parameters   Resp Rate Total 22 br/min   Peak Airway Pressure 25 cmH2O   Mean Airway Pressure 11 cmH20   Exhaled Vt 520 mL   Total Ve 11.3 mL   Spont Ve 0 L   I:E Ratio Measured 1:2.80   Conventional Ventilator Alarms   Ve High Alarm 24 L/min   Resp Rate High Alarm 53 br/min   Press High Alarm 60 cmH2O   Apnea Rate 10   Apnea Volume (mL) 500 mL   Apnea Oxygen Concentration  100   Apnea Flow Rate (L/min) 60   T Apnea 20 sec(s)   Ready to Wean/Extubation Screen   FIO2<60 (chart decimal) 0.3   MV<16L (chart vol.) 11.3   PEEP <10 (chart #) 5   Ready to Wean Parameters   F/VT Ratio<105 (RSBI) (!) 42.31

## 2017-02-13 NOTE — PROGRESS NOTES
"Progress Note  Hospital Medicine  Patient Name:Emilie Proctor Sr.  MRN:  [id number]  Patient Class: IP- Inpatient  Admit Date: 2/2/2017  Length of Stay: 11 days  Expected Discharge Date:   Attending Physician: Socorro Chicas MD  Primary Care Provider:  Marcial Avial MD    SUBJECTIVE:     Principal Problem: Pneumonia due to Escherichia coli  Initial history of present illness: Patient is a 66 y.o. male admitted to Hospitalist Service from [facility] with complaint of "Pneumonia". Patient has PMH significant for anoxic brain injury, hypertension, seizures, hyperlipidemia, chronic respiratory failure, history of alcohol and drug abuse, history of CVA and DM-2. EMS reported patient had worsening chest XRs which showed bilateral pneumonia. Patient has a temperature of 100.8 degrees F. Patient was sent over for further evaluation of possible aspiration. Patient not able to communicate. Looks ill and exhausted.    PMH/PSH/SH/FH/Meds: reviewed.    Symptoms/Review of Systems: Patient is having profse watery diarrhea, rectal tube in place. Afebrile.  Diet:  PEG/J-tube    Activity level: Bed bound  Pain:  No acute distress    OBJECTIVE:   Vital Signs (Most Recent):      Temp: 98.7 °F (37.1 °C) (02/13/17 0400)  Pulse: 63 (02/13/17 0909)  Resp: (!) 22 (02/13/17 0400)  BP: 105/68 (02/13/17 0909)  SpO2: 98 % (02/13/17 0400)       Vital Signs Range (Last 24H):  Temp:  [98.7 °F (37.1 °C)-99.5 °F (37.5 °C)]   Pulse:  [62-80]   Resp:  [0-36]   BP: (103-133)/()   SpO2:  [93 %-98 %]     Weight: 67.5 kg (148 lb 13 oz)  Body mass index is 21.98 kg/(m^2).    Intake/Output Summary (Last 24 hours) at 02/13/17 1046  Last data filed at 02/13/17 0810   Gross per 24 hour   Intake             1930 ml   Output             1400 ml   Net              530 ml     Physical Examination:  General appearance: well developed, appears stated age, chronically ill appearing  Head: normocephalic, atraumatic  Eyes: conjunctivae/corneas " clear. PERRL.  Nose: Nares normal. Septum midline.  Throat: lips, mucosa, and tongue normal; teeth and gums normal, no throat erythema.  Neck: supple, symmetrical, trachea midline, no JVD and thyroid not enlarged, symmetric, no tenderness/mass/nodules. Trach noted.  Lungs: clear to auscultation bilaterally and normal respiratory effort  Chest wall: no tenderness  Heart: regular rate and rhythm, S1, S2 normal, no murmur, click, rub or gallop  Abdomen: soft, non-tender non-distented; bowel sounds normal; no masses, no organomegaly. PEG and J tube site with scant discharge  Extremities: no cyanosis, clubbing or edema.   Pulses: 2+ and symmetric  Skin: Skin color, texture, turgor normal. Stage 2 sacral decubitus (refer to nursing assessment)  Lymph nodes: Cervical, supraclavicular, and axillary nodes normal.  Neurologic: Anoxic brain injury. Non-interactive.    CBC:    Recent Labs  Lab 02/11/17 0410 02/12/17 0330 02/13/17  0420   WBC 9.80 8.90 7.70   RBC 3.52* 3.45* 3.43*   HGB 9.4* 9.0* 9.0*   HCT 28.0* 27.9* 28.0*    307 324   MCV 80* 81* 82   MCH 26.8* 26.1* 26.1*   MCHC 33.6 32.3 32.0   BMP    Recent Labs  Lab 02/11/17 0410 02/12/17  0330 02/13/17  0420    88 86   * 131* 133*   K 4.2 4.0 3.7    100 102   CO2 21* 24 24   BUN 32* 31* 27*   CREATININE 0.7 0.7 0.7   CALCIUM 8.0* 8.1* 8.2*      Diagnostic Results:  Microbiology Results (last 7 days)     Procedure Component Value Units Date/Time    C Diff Toxin by PCR [[id number]] Collected:  02/12/17 1415    Order Status:  Completed Updated:  02/13/17 0615     C. diff PCR Negative    Clostridium difficile EIA [[id number]]  (Abnormal) Collected:  02/12/17 1415    Order Status:  Completed Specimen:  Stool from Stool Updated:  02/12/17 2143     C. diff Antigen Positive (A)     C difficile Toxins A+B, EIA Negative      Testing not recommended for children <24 months old.       Blood culture [[id number]] Collected:  02/06/17 0601    Order Status:   Completed Specimen:  Blood from Peripheral, Left  Hand Updated:  02/11/17 1212     Blood Culture, Routine No growth after 5 days.    Blood culture [[id number]] Collected:  02/06/17 0628    Order Status:  Completed Specimen:  Blood from Peripheral, Left  Hand Updated:  02/11/17 1212     Blood Culture, Routine No growth after 5 days.    Urine culture [[id number]] Collected:  02/08/17 1843    Order Status:  Completed Specimen:  Urine from Urine, Catheterized Updated:  02/10/17 0716     Urine Culture, Routine No growth    Blood culture [[id number]] Collected:  02/03/17 1830    Order Status:  Completed Specimen:  Blood Updated:  02/09/17 0612     Blood Culture, Routine No growth after 5 days.    Blood culture #1 **CANNOT BE ORDERED STAT** [[id number]] Collected:  02/02/17 1613    Order Status:  Completed Specimen:  Blood Updated:  02/07/17 2322     Blood Culture, Routine No growth after 5 days.    Blood culture #2 **CANNOT BE ORDERED STAT** [[id number]] Collected:  02/02/17 1647    Order Status:  Completed Specimen:  Blood Updated:  02/07/17 2322     Blood Culture, Routine No growth after 5 days.      Chest X-Ray: 1.  Stable radiographic appearance to the chest.  Persistent bilateral pulmonary opacities are noted for which differential considerations include pneumonia and aspiration in this patient with history of esophageal stent.  Small left pleural effusion noted.  2.  Stable dilatation of the aorta in this patient with known type B thoracic dissection.    CXR: Stable examination demonstrating patchy bilateral airspace disease likely reflecting pneumonia.    CXR: Stable positioning of support devices. Patchy bilateral infiltrates without significant change.  New small left pleural effusion.    CXR: Continued right upper lobe pneumonia.  Infiltrate and/or atelectasis of the left lower lobe.  Tracheostomy in place    CXR: Appropriately positioned left PICC line.  Persistent bilateral infiltrates and small left pleural  effusion.    Assessment/Plan:   Sepsis with HCAP  HCAP - RUL (Serratia Marcens, E. Coli-ESBL)  Supplemental O2 via nasal canula; titrate O2 saturation to >92%. Follow Dr. Noonan's recommendations.  Continue beta 2 agonist bronchodilator treatments.   Continue IV antibiotics - Merrem.   DC Flagyl as PRC C-Diff is negative.  Check sputum GS and Cx.   Continue routine medications as before. Follow Dr. Naylor's recommendations. Await venous doppler results of lower extremities.     Chronic respiratory failure with Tracheo-esophageal fistula  Continue mechanical ventilation  Continuous oximetry  Nebulized bronchodilator     Hemiplegia as late effect of stroke  Turn and position      Tracheostomy in place  Continue routine tracheostomy care      Functional quadriplegia  Turn and position      Gastrostomy status  Keep g tube open to gravity      Seizure disorder  Continue Levetiracetam, check level.    Case management communicating with Allison ROSAS regarding IV antibiotics arrangements. Likely DC in AM.    VTE Risk Mitigation         Ordered     enoxaparin injection 40 mg  Daily     Route:  Subcutaneous        02/02/17 1947     Low Risk of VTE  Once      02/02/17 1947        Socorro Chicas MD  Department of Hospital Medicine   [facility]

## 2017-02-13 NOTE — PLAN OF CARE
Problem: Patient Care Overview  Goal: Plan of Care Review  Outcome: Ongoing (interventions implemented as appropriate)  Pt remains on chronic vent, he is tolerating tube feedings, he has a new picc line.pt still is responsive and will occasionally.

## 2017-02-13 NOTE — PROGRESS NOTES
I received a message from [place]. I returned the call and left a message for a return call. Mariel Lopes LMSW

## 2017-02-13 NOTE — PROGRESS NOTES
Progress Note  PULMONARY    Admit Date: 2/2/2017 2/13/2017        SUBJECTIVE:     Feb 4, actually animated and following commands today.  No c/o or distress  Feb 5, not arousing much this am, no distress apparent  Feb 6, alert and seems to interact. No distress  Feb 7, stable bedside and no c/o  Feb 8, alert again today, no distress  Feb 9, alert again, looks stable.  Feb 10 no distress, arouses.    PFSH and Allergies reviewed.  Pt's neuro status prevents obtaining ros.    OBJECTIVE:     Vitals (Most recent):  Vitals:    02/13/17 1526   BP:    Pulse: 65   Resp: (!) 22   Temp:        Vitals (24 hour range):  Temp:  [98.6 °F (37 °C)-98.9 °F (37.2 °C)]   Pulse:  [59-80]   Resp:  [22-36]   BP: ()/(58-79)   SpO2:  [93 %-98 %]       Intake/Output Summary (Last 24 hours) at 02/13/17 1629  Last data filed at 02/13/17 1230   Gross per 24 hour   Intake             1975 ml   Output             1400 ml   Net              575 ml          Physical Exam:  The patient's neuro status (alertness,orientation,cognitive function,motor skills,), pharyngeal exam (oral lesions, hygiene, abn dentition,), Neck (jvd,mass,thyroid,nodes in neck and above/below clavicle),RESPIRATORY(symmetry,effort,fremitus,percussion,auscultation),  Cor(rhythm,heart tones including gallops,perfusion,edema)ABD(distention,hepatic&splenomegaly,tenderness,masses), Skin(rash,cyanosis),Psyc(affect,judgement,).  Exam negative except for these pertinent findings:    Alert, no distres.Trach, secretions controlled, no ecxess trach secretions, no edema, good bs.  Stable exam 2/13/2017       Diagnostic Results:  All labs last 4 entries of CBC,CMP/BMP,AGB, MICRO reviewed.  All rediographs of chest, lungs, & abd reviewed by direct vision last 3 days.     cxr 2/12, looks godd    .   Results for KENNA ALEXIS SR. (MRN [id number]) as of 2/13/2017 16:29   Ref. Range 2/13/2017 04:20   WBC Latest Ref Range: 3.90 - 12.70 K/uL 7.70   RBC Latest Ref Range: 4.60 - 6.20  M/uL 3.43 (L)   Hemoglobin Latest Ref Range: 14.0 - 18.0 g/dL 9.0 (L)   Hematocrit Latest Ref Range: 40.0 - 54.0 % 28.0 (L)   MCV Latest Ref Range: 82 - 98 fL 82   MCH Latest Ref Range: 27.0 - 31.0 pg 26.1 (L)   MCHC Latest Ref Range: 32.0 - 36.0 % 32.0   RDW Latest Ref Range: 11.5 - 14.5 % 17.5 (H)   Platelets Latest Ref Range: 150 - 350 K/uL 324   MPV Latest Ref Range: 9.2 - 12.9 fL 8.2 (L)   Gran% Latest Ref Range: 38.0 - 73.0 % 47.2   Gran # Latest Ref Range: 1.8 - 7.7 K/uL 3.6   Lymph% Latest Ref Range: 18.0 - 48.0 % 41.9   Lymph # Latest Ref Range: 1.0 - 4.8 K/uL 3.2   Mono% Latest Ref Range: 4.0 - 15.0 % 7.7   Mono # Latest Ref Range: 0.3 - 1.0 K/uL 0.6   Eosinophil% Latest Ref Range: 0.0 - 8.0 % 2.9   Eos # Latest Ref Range: 0.0 - 0.5 K/uL 0.2   Basophil% Latest Ref Range: 0.0 - 1.9 % 0.3   Baso # Latest Ref Range: 0.00 - 0.20 K/uL 0.00   Sodium Latest Ref Range: 136 - 145 mmol/L 133 (L)   Potassium Latest Ref Range: 3.5 - 5.1 mmol/L 3.7   Chloride Latest Ref Range: 95 - 110 mmol/L 102   CO2 Latest Ref Range: 23 - 29 mmol/L 24   Anion Gap Latest Ref Range: 8 - 16 mmol/L 7 (L)   BUN, Bld Latest Ref Range: 8 - 23 mg/dL 27 (H)   Creatinine Latest Ref Range: 0.5 - 1.4 mg/dL 0.7   eGFR if non African American Latest Ref Range: >60 mL/min/1.73 m^2 >60   eGFR if  Latest Ref Range: >60 mL/min/1.73 m^2 >60   Glucose Latest Ref Range: 70 - 110 mg/dL 86   Calcium Latest Ref Range: 8.7 - 10.5 mg/dL 8.2 (L)   Alkaline Phosphatase Latest Ref Range: 55 - 135 U/L 119   Total Protein Latest Ref Range: 6.0 - 8.4 g/dL 7.7   Albumin Latest Ref Range: 3.5 - 5.2 g/dL 1.9 (L)   Total Bilirubin Latest Ref Range: 0.1 - 1.0 mg/dL 1.1 (H)   AST Latest Ref Range: 10 - 40 U/L 21   ALT Latest Ref Range: 10 - 44 U/L 16         Susceptibility       Serratia marcescens Escherichia coli esbl       CULTURE, RESPIRATORY CULTURE, RESPIRATORY       Amikacin <=16  Sensitive <=16  Sensitive       Amox/K Clav'ate  >16/8  Resistant        Amp/Sulbactam  >16/8  Resistant       Ampicillin  >16  Resistant       Cefazolin  >16  Resistant       Cefepime <=8  Sensitive >16  Resistant       Ceftriaxone <=8  Sensitive >32  Resistant       Ciprofloxacin >2  Resistant >2  Resistant       Ertapenem <=2  Sensitive >4  Resistant       Gentamicin <=4  Sensitive <=4  Sensitive       Meropenem <=4  Sensitive <=4  Sensitive       Piperacillin/Tazo <=16  Sensitive <=16  Sensitive       Tetracycline  >8  Resistant       Tobramycin >8  Resistant 8  Intermediate       Trimeth/Sulfa >2/38  Resistant >2/38  Resistant                      Specimen Collected: 02/02/17 10:28 AM Last Resulted: 02/06/17 12:57 PM Lab Flowsheet Order Details View             ASSESSMENT/PLAN:     Patient Active Problem List   Diagnosis    Aortic dissection distal to left subclavian    Hemiplegia as late effect of stroke    Tracheostomy in place    Functional quadriplegia    Jejunostomy tube in situ    Tracheo-esophageal fistula    Anemia, chronic disease    Pneumonia due to Escherichia coli    Acute on chronic respiratory failure with hypercapnia    Gastrostomy status    Protein-calorie malnutrition, moderate    Seizure disorder    History of CVA (cerebrovascular accident)    Anoxic brain injury    Aspiration into airway    Abnormal CXR    Malfunctioning jejunostomy tube    Acute cystitis with hematuria    Chronic respiratory failure    Transaminitis    Sepsis    Pneumonia of both lower lobes due to infectious organism     Specialty Problems        Pulmonary Problems    Tracheostomy in place        Tracheo-esophageal fistula        * (Principal)Pneumonia due to Escherichia coli        Acute on chronic respiratory failure with hypercapnia        Aspiration into airway        Chronic respiratory failure        Pneumonia due to infectious organism                  rec     Feb 6, looks good, alert, sourse fever not clear but wbc good,  cxr na- if cxr stable could go to  [facility] for abx completion - another 3 days    Drop vancomcyin soon with above culture.    Feb 7, remains stable with low grade temp, wbc better and abx appropriate  Will stop vanc.    Feb8 , temp back up with nl wbc,  Pt has ongoing aspiration with t e fistula palliated by stent.  Prolonged abx will not change fistula.      Would defer adjustment rx to ID, Dr Parra.  Would not manipulate trach for fear of fistula problems.  Trial anti pyretics?     Feb 9, day 7 abx, low grade temp. U/a ok.  c dif and leg study na.     No new recs.  Approaching adequate course of abx for lungs?   Will not manipulate fistula/trach. No new rec, continue rx/support.  Feb 10, Dr Parra note noted.  Temp down. abx per Dr Parra.  Please call if needed.    Feb 13, temp and wbc are good, cxr was good 2/12.  No new recommendations.      .

## 2017-02-13 NOTE — PROGRESS NOTES
Pt had large loose bm.  Entire bed change done with bed bath due to leaking rectal tube.  Irrigation done to tube per protocol.  Vitals remain stable.  All lines and tubes intact and secure.

## 2017-02-13 NOTE — DISCHARGE INSTRUCTIONS
Admit to [place]     Dx-   Patient Active Problem List   Diagnosis    Aortic dissection distal to left subclavian    Hemiplegia as late effect of stroke    Tracheostomy in place    Functional quadriplegia    Jejunostomy tube in situ    Tracheo-esophageal fistula    Anemia, chronic disease    Pneumonia due to Escherichia coli    Acute on chronic respiratory failure with hypercapnia    Gastrostomy status    Protein-calorie malnutrition, moderate    Seizure disorder    History of CVA (cerebrovascular accident)    Anoxic brain injury    Aspiration into airway    Abnormal CXR    Malfunctioning jejunostomy tube    Acute cystitis with hematuria    Chronic respiratory failure    Transaminitis    Sepsis    Pneumonia of both lower lobes due to infectious organism     Diet- NPO    TF- Diabetasource AC @65cc/hr with 60cc H2O flushes q4  Aspiration Precautions   Keep HOB @30 degrees     Vent Mode: A/C  Oxygen Concentration (%):  [30] 30  Resp Rate Total:  [22 br/min-26 br/min] 22 br/min  Vt Set:  [500 mL] 500 mL  PEEP/CPAP:  [5 cmH20] 5 cmH20  Pressure Support:  [0 cmH20] 0 cmH20  Mean Airway Pressure:  [10 mxL53-11 cmH20] 11 cmH20    PICC line care and dressing changes per protocol     Turn q2 while in bed    Tygacil 50mg IV q12 and Cefepime 2gm IV q12 x14 days; stop date- 2/2/17; Dx- Pneumonia due to Escherichia coli

## 2017-02-13 NOTE — PROGRESS NOTES
"Progress Note  [facility] Medicine    Admit Date: 2/2/2017    SUBJECTIVE:     Follow-up For:  Pneumonia due to Escherichia coli      Interval history (See H&P for complete P,F,SHx) : Patient remains at neurologic baseline on vent. Vent settings stable. No events overnight.    Review of Systems: List if applicable  Review of Systems   Unable to perform ROS: Patient nonverbal       OBJECTIVE:     Vital Signs Range (Last 24H):  Temp:  [98.9 °F (37.2 °C)-99.8 °F (37.7 °C)]   Pulse:  [62-74]   Resp:  [0-24]   BP: (100-133)/()   SpO2:  [93 %-97 %]     I & O (Last 24H):    Intake/Output Summary (Last 24 hours) at 02/12/17 6289  Last data filed at 02/12/17 1730   Gross per 24 hour   Intake             1190 ml   Output             1040 ml   Net              150 ml       Estimated body mass index is 21.98 kg/(m^2) as calculated from the following:    Height as of this encounter: 5' 9" (1.753 m).    Weight as of this encounter: 67.5 kg (148 lb 13 oz).    Physical Exam   Constitutional:   Chronically ill, malnourished, contracted patient who is nonverbal   HENT:   Nose: Nose normal.   Mouth/Throat: Oropharynx is clear and moist.   Eyes:   Pupils constricted and poorly responsive   Neck: Neck supple. No thyromegaly present.   Trach in place with ventilator attached.   Cardiovascular: Regular rhythm.  Exam reveals no gallop and no friction rub.    Murmur heard.  Tachycardic with murmur   Pulmonary/Chest: No respiratory distress. He has no wheezes.   Increased effort. Breath sounds coarse on ventilator.   Abdominal: Soft. He exhibits no distension. There is no tenderness. There is no guarding.   Noted J tube/ G tube with intact sites.   Musculoskeletal: He exhibits edema and deformity. He exhibits no tenderness.   Chronically contracted extremities   Neurological: He is alert. He displays abnormal reflex. A cranial nerve deficit is present. He exhibits abnormal muscle tone. Coordination abnormal.   Chronic contracted " extremities. Nonverbal in chronic comatose state   Skin: No rash noted. He is not diaphoretic. No erythema.   Multiple skin breakdown sites identified.   Nursing note and vitals reviewed.        Laboratory/Diagnostic Data:  Reviewed and noted in plan where applicable- Please see chart for full lab data.    Medications:  Medication list was reviewed and changes noted under Assessment/Plan.    ASSESSMENT/PLAN:     Active Problems:    Active Hospital Problems    Diagnosis  POA    *HCAP (healthcare-associated pneumonia) [J18.9]-  Continue broad spectrum ABX + h/o ESBL x7-10 days. Reduced FiO2 to baseline from NH. Continue nebs. ID consulted. Poor overall prognosis.  Yes    Sepsis [A41.9]-  S/p fluids. No S/Sx of shock. Continue Broad spectrum ABX with h/o MDRO. PICC team consulted.  Yes    Chronic respiratory failure [J96.10]- Adjusted vent, and restore chronic settings-   Vent Mode: A/C  Oxygen Concentration (%):  [30] 30  Resp Rate Total:  [22 br/min-24 br/min] 22 br/min  Vt Set:  [500 mL] 500 mL  PEEP/CPAP:  [5 cmH20] 5 cmH20  Pressure Support:  [0 cmH20] 0 cmH20  Mean Airway Pressure:  [11 jkO45-82 cmH20] 12 cmH20    Yes    Gastrostomy status [Z93.1]-  Routine care  Not Applicable    Protein-calorie malnutrition, moderate [E44.0]-  Continue tube feeds.  Yes    Anoxic brain injury [G93.1]-  Likely very poor short term prognosis. Would speak with family if available.  Yes    History of CVA (cerebrovascular accident) [Z86.73]  Not Applicable    Seizure disorder [G40.909]-  Continue keppra.  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   Yes    Tracheostomy in place [Z93.0]-  Routine care  Not Applicable    Functional quadriplegia [R53.2]-  No functional quality of life or ability to improve.  Yes    Jejunostomy tube in situ [Z93.4]-  Routine care  Not Applicable      Resolved Hospital Problems    Diagnosis Date Resolved POA   No resolved problems to display.       Disposition-  SNF    VTE Risk Mitigation         Ordered     enoxaparin injection 40 mg  Daily     Route:  Subcutaneous        02/02/17 1947     Low Risk of VTE  Once      02/02/17 1947        Time spent in critical care (in addition to E/M time mentioned above) Time spent to titrate meds, adjust ventilator settings, and provide counseling and coordination to critical care team 36 min

## 2017-02-13 NOTE — PROGRESS NOTES
02/13/17 0400       Surgical Airway Shiley Long;Distal;Cuffed   No Placement Date or Time found.   Present Prior to Hospital Arrival?: Yes  Type: Tracheostomy  Brand: Shiley  Airway Device Size: 7.0  Style: Long;Distal;Cuffed   Cuff Pressure 30 cm H2O   Status Secured   Site Assessment Clean;Dry;No bleeding;No drainage   Site Care Cleansed;Dried;Dressing applied   Inner Cannula Care Cleansed/dried   Ties Assessment Clean;Dry;Intact;Secure

## 2017-02-13 NOTE — PROGRESS NOTES
Per Brittney at [facility] [phone], they are able to accept the pt back today with the medications sent. She needs to check to see about an isolation bed and will call me back. Mariel Lopes LMSW

## 2017-02-13 NOTE — PROGRESS NOTES
I updated Brittney at [facility] [phone] that the pt will not be ready for discharge until tomorrow. Mariel Lopes LMSW

## 2017-02-13 NOTE — PROGRESS NOTES
Progress Note  Infectious Disease    Admit Date: 2/2/2017   LOS: 10 days     SUBJECTIVE:     Follow-up For:  Aspiration pneumonia     Antibiotics     Start     Stop Route Frequency Ordered    02/09/17 1830  ceFEPIme in dextrose 5% 2 gram/50 mL IVPB 2 g      -- IV Every 12 hours (non-standard times) 02/09/17 1746    02/10/17 0600  tigecycline (TYGACIL) 50 mg in dextrose 5 % 100 mL IVPB      -- IV Every 12 hours (non-standard times) 02/09/17 1746          Review of Systems:  Unable to obtain     OBJECTIVE:     Vital Signs (Most Recent)  Temp: 99 °F (37.2 °C) (02/12/17 1530)  Pulse: 63 (02/12/17 1900)  Resp: (!) 22 (02/12/17 1900)  BP: 113/71 (02/12/17 1900)  SpO2: 97 % (02/12/17 1900)    Temperature Range Min/Max (Last 24H):  Temp:  [99 °F (37.2 °C)-99.8 °F (37.7 °C)]     I & O (Last 24H):  Intake/Output Summary (Last 24 hours) at 02/12/17 2201  Last data filed at 02/12/17 1730   Gross per 24 hour   Intake             1190 ml   Output             1040 ml   Net              150 ml       Physical Exam:  Awake, opens eyes to voice.   trached an on vent ,   rrr no m/g/r   Lungs are cta anteriorly   abd soft peg and j tube   Upper and lower extremity contractures, no shkin changes     Lines/Drains:       PICC Double Lumen 02/12/17 1340 left basilic (Active)   Site Assessment Clean;Dry;Intact;No redness;No swelling 2/12/2017  3:33 PM   Lumen 1 Status Flushed 2/12/2017  3:33 PM   Lumen 2 Status Flushed 2/12/2017  3:33 PM   Dressing Type Securing device;Transparent 2/12/2017  3:33 PM   Dressing Status Biopatch in place;Clean;Dry;Intact;New drainage 2/12/2017  3:33 PM   Dressing Change Due 02/19/17 2/12/2017  3:33 PM   Daily Line Review Performed 2/12/2017  3:33 PM            Peripheral IV - Single Lumen 02/10/17 1915 Right Hand (Active)   Site Assessment Clean;Dry;Intact 2/12/2017  3:33 PM   Line Status Blood return noted;Flushed;Infusing 2/12/2017  3:33 PM   Dressing Status Clean;Dry;Intact 2/12/2017  3:33 PM        Laboratory:  CBC    Recent Labs  Lab 02/12/17 0330   WBC 8.90   RBC 3.45*   HGB 9.0*   HCT 27.9*      MCV 81*   MCH 26.1*   MCHC 32.3     BMP    Recent Labs  Lab 02/12/17 0330   *   K 4.0      CO2 24   BUN 31*   CREATININE 0.7   CALCIUM 8.1*     Microbiology Results (last 7 days)     Procedure Component Value Units Date/Time    C Diff Toxin by PCR [[id number]] Collected:  02/12/17 1415    Order Status:  No result Updated:  02/12/17 2143    Clostridium difficile EIA [[id number]]  (Abnormal) Collected:  02/12/17 1415    Order Status:  Completed Specimen:  Stool from Stool Updated:  02/12/17 2143     C. diff Antigen Positive (A)     C difficile Toxins A+B, EIA Negative      Testing not recommended for children <24 months old.       Blood culture [[id number]] Collected:  02/06/17 0601    Order Status:  Completed Specimen:  Blood from Peripheral, Left  Hand Updated:  02/11/17 1212     Blood Culture, Routine No growth after 5 days.    Blood culture [[id number]] Collected:  02/06/17 0628    Order Status:  Completed Specimen:  Blood from Peripheral, Left  Hand Updated:  02/11/17 1212     Blood Culture, Routine No growth after 5 days.    Urine culture [[id number]] Collected:  02/08/17 1843    Order Status:  Completed Specimen:  Urine from Urine, Catheterized Updated:  02/10/17 0716     Urine Culture, Routine No growth    Blood culture [[id number]] Collected:  02/03/17 1830    Order Status:  Completed Specimen:  Blood Updated:  02/09/17 0612     Blood Culture, Routine No growth after 5 days.    Blood culture #1 **CANNOT BE ORDERED STAT** [[id number]] Collected:  02/02/17 1613    Order Status:  Completed Specimen:  Blood Updated:  02/07/17 2322     Blood Culture, Routine No growth after 5 days.    Blood culture #2 **CANNOT BE ORDERED STAT** [[id number]] Collected:  02/02/17 1647    Order Status:  Completed Specimen:  Blood Updated:  02/07/17 2322     Blood Culture, Routine No growth after 5 days.    Blood  culture [[id number]]     Order Status:  Canceled Specimen:  Blood     Narrative:       Culture Blood was cancelled on 02/06/2017 at 06:02 by [facility]; Duplicate   order    Blood culture [[id number]]     Order Status:  Canceled Specimen:  Blood     Narrative:       Culture Blood was cancelled on 02/06/2017 at 06:02 by [facility]; Duplicate   order          ASSESSMENT/PLAN:     Active Hospital Problems    Diagnosis  POA    *Pneumonia due to Escherichia coli [J15.5]  Yes    Pneumonia of both lower lobes due to infectious organism [J18.9]  Yes    Sepsis [A41.9]  Yes    Chronic respiratory failure [J96.10]  Yes    Gastrostomy status [Z93.1]  Not Applicable    Protein-calorie malnutrition, moderate [E44.0]  Yes    Anoxic brain injury [G93.1]  Yes    History of CVA (cerebrovascular accident) [Z86.73]  Not Applicable    Seizure disorder [G40.909]  Yes    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     Diarrhea - waiting on PCR for cdiff     - Continue current abx.   - will add Flagyl empirically while waiting on PCR   - repeat CXR   -  Will follow. Please call with questions.

## 2017-02-13 NOTE — PROGRESS NOTES
02/12/17 1900   PRE-TX-O2-ETCO2   Oxygen Concentration (%) 30   SpO2 97 %   ETCO2 (mmHg) 29 mmHg   Pulse 63   Resp (!) 22   /71       Surgical Airway Shiley Long;Distal;Cuffed   No Placement Date or Time found.   Present Prior to Hospital Arrival?: Yes  Type: Tracheostomy  Brand: Shiley  Airway Device Size: 7.0  Style: Long;Distal;Cuffed   Cuff Pressure 30 cm H2O   Status Secured   Site Assessment Dry;Clean;No bleeding;No drainage   Site Care Protective barrier to skin   Ties Assessment Clean;Dry;Intact;Secure   Preset Conventional Ventilator Settings   Vent Type    Ventilation Type VC   Vent Mode A/C   Humidity HME   Set Rate 22 bmp   Vt Set 500 mL   PEEP/CPAP 5 cmH20   Pressure Support 0 cmH20   Waveform RAMP   Peak Flow 75 L/min   Set Inspiratory Pressure 0 cmH20   Insp Time 0 Sec(s)   Plateau Set/Insp. Hold (sec) 0   Insp Rise Time  0 %   Trigger Sensitivity Flow/I-Trigger 1.5 L/min   P High 0 cm H2O   P Low 0 cm H2O   T High 0 sec   T Low 0 sec   Patient Ventilator Parameters   Resp Rate Total 22 br/min   Peak Airway Pressure 32 cmH2O   Mean Airway Pressure 12 cmH20   Exhaled Vt 518 mL   Total Ve 11.4 mL   Spont Ve 0 L   I:E Ratio Measured 1:2.80   Conventional Ventilator Alarms   Alarms On Y   Ve High Alarm 24 L/min   Resp Rate High Alarm 53 br/min   Press High Alarm 60 cmH2O   Apnea Rate 10   Apnea Volume (mL) 500 mL   Apnea Oxygen Concentration  100   Apnea Flow Rate (L/min) 60   T Apnea 20 sec(s)

## 2017-02-14 ENCOUNTER — HOSPITAL ENCOUNTER (INPATIENT)
Facility: HOSPITAL | Age: 67
LOS: 1 days | DRG: 377 | End: 2017-02-15
Attending: EMERGENCY MEDICINE | Admitting: INTERNAL MEDICINE
Payer: MEDICARE

## 2017-02-14 VITALS
TEMPERATURE: 98 F | SYSTOLIC BLOOD PRESSURE: 107 MMHG | OXYGEN SATURATION: 98 % | DIASTOLIC BLOOD PRESSURE: 70 MMHG | HEIGHT: 69 IN | HEART RATE: 62 BPM | RESPIRATION RATE: 22 BRPM | WEIGHT: 152.56 LBS | BODY MASS INDEX: 22.6 KG/M2

## 2017-02-14 DIAGNOSIS — J15.5 PNEUMONIA DUE TO ESCHERICHIA COLI, UNSPECIFIED LATERALITY, UNSPECIFIED PART OF LUNG: ICD-10-CM

## 2017-02-14 DIAGNOSIS — K92.2 LOWER GI BLEED: ICD-10-CM

## 2017-02-14 DIAGNOSIS — D63.8 ANEMIA, CHRONIC DISEASE: ICD-10-CM

## 2017-02-14 DIAGNOSIS — J96.11 CHRONIC RESPIRATORY FAILURE WITH HYPOXIA: ICD-10-CM

## 2017-02-14 LAB
ABO + RH BLD: NORMAL
ALBUMIN SERPL BCP-MCNC: 1.9 G/DL
ALBUMIN SERPL BCP-MCNC: 2 G/DL
ALP SERPL-CCNC: 124 U/L
ALP SERPL-CCNC: 128 U/L
ALT SERPL W/O P-5'-P-CCNC: 12 U/L
ALT SERPL W/O P-5'-P-CCNC: 16 U/L
ANION GAP SERPL CALC-SCNC: 6 MMOL/L
ANION GAP SERPL CALC-SCNC: 6 MMOL/L
APTT BLDCRRT: 39 SEC
AST SERPL-CCNC: 21 U/L
AST SERPL-CCNC: 21 U/L
BACTERIA #/AREA URNS HPF: ABNORMAL /HPF
BASOPHILS # BLD AUTO: 0 K/UL
BASOPHILS # BLD AUTO: 0 K/UL
BASOPHILS NFR BLD: 0.5 %
BASOPHILS NFR BLD: 0.6 %
BILIRUB SERPL-MCNC: 0.6 MG/DL
BILIRUB SERPL-MCNC: 1 MG/DL
BILIRUB UR QL STRIP: NEGATIVE
BLD GP AB SCN CELLS X3 SERPL QL: NORMAL
BUN SERPL-MCNC: 24 MG/DL
BUN SERPL-MCNC: 26 MG/DL
CALCIUM SERPL-MCNC: 8 MG/DL
CALCIUM SERPL-MCNC: 8.3 MG/DL
CHLORIDE SERPL-SCNC: 103 MMOL/L
CHLORIDE SERPL-SCNC: 105 MMOL/L
CLARITY UR: CLEAR
CO2 SERPL-SCNC: 21 MMOL/L
CO2 SERPL-SCNC: 24 MMOL/L
COLOR UR: YELLOW
CREAT SERPL-MCNC: 0.6 MG/DL
CREAT SERPL-MCNC: 0.7 MG/DL
DIFFERENTIAL METHOD: ABNORMAL
DIFFERENTIAL METHOD: ABNORMAL
EOSINOPHIL # BLD AUTO: 0.2 K/UL
EOSINOPHIL # BLD AUTO: 0.2 K/UL
EOSINOPHIL NFR BLD: 2.2 %
EOSINOPHIL NFR BLD: 3.4 %
ERYTHROCYTE [DISTWIDTH] IN BLOOD BY AUTOMATED COUNT: 18 %
ERYTHROCYTE [DISTWIDTH] IN BLOOD BY AUTOMATED COUNT: 18 %
EST. GFR  (AFRICAN AMERICAN): >60 ML/MIN/1.73 M^2
EST. GFR  (AFRICAN AMERICAN): >60 ML/MIN/1.73 M^2
EST. GFR  (NON AFRICAN AMERICAN): >60 ML/MIN/1.73 M^2
EST. GFR  (NON AFRICAN AMERICAN): >60 ML/MIN/1.73 M^2
GLUCOSE SERPL-MCNC: 103 MG/DL
GLUCOSE SERPL-MCNC: 91 MG/DL
GLUCOSE UR QL STRIP: NEGATIVE
HCT VFR BLD AUTO: 28 %
HCT VFR BLD AUTO: 28.6 %
HGB BLD-MCNC: 9 G/DL
HGB BLD-MCNC: 9.2 G/DL
HGB UR QL STRIP: NEGATIVE
HYALINE CASTS #/AREA URNS LPF: 18 /LPF
INR PPP: 1.3
KETONES UR QL STRIP: NEGATIVE
LEUKOCYTE ESTERASE UR QL STRIP: NEGATIVE
LYMPHOCYTES # BLD AUTO: 2.9 K/UL
LYMPHOCYTES # BLD AUTO: 3.1 K/UL
LYMPHOCYTES NFR BLD: 40.6 %
LYMPHOCYTES NFR BLD: 43.3 %
MCH RBC QN AUTO: 26.1 PG
MCH RBC QN AUTO: 26.2 PG
MCHC RBC AUTO-ENTMCNC: 32 %
MCHC RBC AUTO-ENTMCNC: 32.3 %
MCV RBC AUTO: 81 FL
MCV RBC AUTO: 82 FL
MICROSCOPIC COMMENT: ABNORMAL
MONOCYTES # BLD AUTO: 0.6 K/UL
MONOCYTES # BLD AUTO: 0.6 K/UL
MONOCYTES NFR BLD: 8.4 %
MONOCYTES NFR BLD: 8.5 %
NEUTROPHILS # BLD AUTO: 3.1 K/UL
NEUTROPHILS # BLD AUTO: 3.4 K/UL
NEUTROPHILS NFR BLD: 44.4 %
NEUTROPHILS NFR BLD: 48.1 %
NITRITE UR QL STRIP: NEGATIVE
PH UR STRIP: 6 [PH] (ref 5–8)
PLATELET # BLD AUTO: 327 K/UL
PLATELET # BLD AUTO: 330 K/UL
PMV BLD AUTO: 7.9 FL
PMV BLD AUTO: 8 FL
POTASSIUM SERPL-SCNC: 3.8 MMOL/L
POTASSIUM SERPL-SCNC: 3.9 MMOL/L
PROT SERPL-MCNC: 7.7 G/DL
PROT SERPL-MCNC: 7.9 G/DL
PROT UR QL STRIP: ABNORMAL
PROTHROMBIN TIME: 13.2 SEC
RBC # BLD AUTO: 3.45 M/UL
RBC # BLD AUTO: 3.51 M/UL
RBC #/AREA URNS HPF: 1 /HPF (ref 0–4)
SODIUM SERPL-SCNC: 132 MMOL/L
SODIUM SERPL-SCNC: 133 MMOL/L
SP GR UR STRIP: 1.02 (ref 1–1.03)
URN SPEC COLLECT METH UR: ABNORMAL
UROBILINOGEN UR STRIP-ACNC: NEGATIVE EU/DL
WBC # BLD AUTO: 7 K/UL
WBC # BLD AUTO: 7.1 K/UL
WBC #/AREA URNS HPF: 3 /HPF (ref 0–5)

## 2017-02-14 PROCEDURE — 85025 COMPLETE CBC W/AUTO DIFF WBC: CPT

## 2017-02-14 PROCEDURE — 99285 EMERGENCY DEPT VISIT HI MDM: CPT | Mod: 25

## 2017-02-14 PROCEDURE — 36415 COLL VENOUS BLD VENIPUNCTURE: CPT

## 2017-02-14 PROCEDURE — 85610 PROTHROMBIN TIME: CPT

## 2017-02-14 PROCEDURE — 99222 1ST HOSP IP/OBS MODERATE 55: CPT | Mod: ,,, | Performed by: INTERNAL MEDICINE

## 2017-02-14 PROCEDURE — 12000002 HC ACUTE/MED SURGE SEMI-PRIVATE ROOM

## 2017-02-14 PROCEDURE — 5A1935Z RESPIRATORY VENTILATION, LESS THAN 24 CONSECUTIVE HOURS: ICD-10-PCS | Performed by: INTERNAL MEDICINE

## 2017-02-14 PROCEDURE — 20000000 HC ICU ROOM

## 2017-02-14 PROCEDURE — 63600175 PHARM REV CODE 636 W HCPCS: Performed by: INTERNAL MEDICINE

## 2017-02-14 PROCEDURE — 93005 ELECTROCARDIOGRAM TRACING: CPT

## 2017-02-14 PROCEDURE — 27000221 HC OXYGEN, UP TO 24 HOURS

## 2017-02-14 PROCEDURE — 94002 VENT MGMT INPAT INIT DAY: CPT

## 2017-02-14 PROCEDURE — 25000003 PHARM REV CODE 250: Performed by: INTERNAL MEDICINE

## 2017-02-14 PROCEDURE — 86900 BLOOD TYPING SEROLOGIC ABO: CPT

## 2017-02-14 PROCEDURE — 85730 THROMBOPLASTIN TIME PARTIAL: CPT

## 2017-02-14 PROCEDURE — 94003 VENT MGMT INPAT SUBQ DAY: CPT

## 2017-02-14 PROCEDURE — 99239 HOSP IP/OBS DSCHRG MGMT >30: CPT | Mod: ,,, | Performed by: INTERNAL MEDICINE

## 2017-02-14 PROCEDURE — 86901 BLOOD TYPING SEROLOGIC RH(D): CPT

## 2017-02-14 PROCEDURE — 80053 COMPREHEN METABOLIC PANEL: CPT

## 2017-02-14 PROCEDURE — 85025 COMPLETE CBC W/AUTO DIFF WBC: CPT | Mod: 91

## 2017-02-14 PROCEDURE — 99900026 HC AIRWAY MAINTENANCE (STAT)

## 2017-02-14 PROCEDURE — 93010 ELECTROCARDIOGRAM REPORT: CPT | Mod: ,,, | Performed by: INTERNAL MEDICINE

## 2017-02-14 PROCEDURE — 94770 HC EXHALED C02 TEST: CPT

## 2017-02-14 PROCEDURE — 63600175 PHARM REV CODE 636 W HCPCS: Performed by: NURSE PRACTITIONER

## 2017-02-14 PROCEDURE — C9113 INJ PANTOPRAZOLE SODIUM, VIA: HCPCS | Performed by: NURSE PRACTITIONER

## 2017-02-14 PROCEDURE — 80053 COMPREHEN METABOLIC PANEL: CPT | Mod: 91

## 2017-02-14 PROCEDURE — 94761 N-INVAS EAR/PLS OXIMETRY MLT: CPT

## 2017-02-14 PROCEDURE — 25000003 PHARM REV CODE 250: Performed by: HOSPITALIST

## 2017-02-14 PROCEDURE — 81000 URINALYSIS NONAUTO W/SCOPE: CPT

## 2017-02-14 RX ORDER — ACETYLCYSTEINE 200 MG/ML
4 SOLUTION ORAL; RESPIRATORY (INHALATION) EVERY 6 HOURS
Status: DISCONTINUED | OUTPATIENT
Start: 2017-02-15 | End: 2017-02-15

## 2017-02-14 RX ORDER — IBUPROFEN 200 MG
16 TABLET ORAL
Status: DISCONTINUED | OUTPATIENT
Start: 2017-02-14 | End: 2017-02-15 | Stop reason: HOSPADM

## 2017-02-14 RX ORDER — GLUCAGON 1 MG
1 KIT INJECTION
Status: DISCONTINUED | OUTPATIENT
Start: 2017-02-14 | End: 2017-02-15 | Stop reason: HOSPADM

## 2017-02-14 RX ORDER — FAMOTIDINE 40 MG/5ML
20 POWDER, FOR SUSPENSION ORAL 2 TIMES DAILY
Refills: 0
Start: 2017-02-14 | End: 2018-02-14

## 2017-02-14 RX ORDER — LEVETIRACETAM 100 MG/ML
500 SOLUTION ORAL 2 TIMES DAILY
Status: DISCONTINUED | OUTPATIENT
Start: 2017-02-15 | End: 2017-02-15 | Stop reason: HOSPADM

## 2017-02-14 RX ORDER — ONDANSETRON 2 MG/ML
4 INJECTION INTRAMUSCULAR; INTRAVENOUS EVERY 6 HOURS PRN
Status: DISCONTINUED | OUTPATIENT
Start: 2017-02-14 | End: 2017-02-15 | Stop reason: HOSPADM

## 2017-02-14 RX ORDER — CEFEPIME HYDROCHLORIDE 2 G/50ML
2 INJECTION, SOLUTION INTRAVENOUS EVERY 12 HOURS
Qty: 1000 ML | Refills: 0 | Status: ON HOLD
Start: 2017-02-14 | End: 2017-02-15

## 2017-02-14 RX ORDER — IBUPROFEN 200 MG
24 TABLET ORAL
Status: DISCONTINUED | OUTPATIENT
Start: 2017-02-14 | End: 2017-02-15 | Stop reason: HOSPADM

## 2017-02-14 RX ORDER — ENOXAPARIN SODIUM 100 MG/ML
40 INJECTION SUBCUTANEOUS DAILY
Start: 2017-02-14

## 2017-02-14 RX ORDER — CARVEDILOL 6.25 MG/1
12.5 TABLET ORAL 2 TIMES DAILY
Status: DISCONTINUED | OUTPATIENT
Start: 2017-02-15 | End: 2017-02-15 | Stop reason: HOSPADM

## 2017-02-14 RX ORDER — ROSUVASTATIN CALCIUM 5 MG/1
5 TABLET, COATED ORAL DAILY
Status: DISCONTINUED | OUTPATIENT
Start: 2017-02-15 | End: 2017-02-15 | Stop reason: HOSPADM

## 2017-02-14 RX ORDER — CHLORHEXIDINE GLUCONATE ORAL RINSE 1.2 MG/ML
15 SOLUTION DENTAL 2 TIMES DAILY
Status: DISCONTINUED | OUTPATIENT
Start: 2017-02-15 | End: 2017-02-15 | Stop reason: HOSPADM

## 2017-02-14 RX ORDER — GLYCOPYRROLATE 1 MG/1
1 TABLET ORAL 3 TIMES DAILY
Status: DISCONTINUED | OUTPATIENT
Start: 2017-02-15 | End: 2017-02-15 | Stop reason: HOSPADM

## 2017-02-14 RX ORDER — CEFEPIME HYDROCHLORIDE 2 G/50ML
2 INJECTION, SOLUTION INTRAVENOUS EVERY 12 HOURS
Status: DISCONTINUED | OUTPATIENT
Start: 2017-02-15 | End: 2017-02-15

## 2017-02-14 RX ORDER — IPRATROPIUM BROMIDE AND ALBUTEROL SULFATE 2.5; .5 MG/3ML; MG/3ML
3 SOLUTION RESPIRATORY (INHALATION) EVERY 6 HOURS
Status: DISCONTINUED | OUTPATIENT
Start: 2017-02-15 | End: 2017-02-15 | Stop reason: HOSPADM

## 2017-02-14 RX ORDER — PANTOPRAZOLE SODIUM 40 MG/10ML
40 INJECTION, POWDER, LYOPHILIZED, FOR SOLUTION INTRAVENOUS DAILY
Status: DISCONTINUED | OUTPATIENT
Start: 2017-02-15 | End: 2017-02-15 | Stop reason: HOSPADM

## 2017-02-14 RX ADMIN — GLYCOPYRROLATE 1 MG: 1 TABLET ORAL at 06:02

## 2017-02-14 RX ADMIN — MICONAZOLE NITRATE: 20 OINTMENT TOPICAL at 09:02

## 2017-02-14 RX ADMIN — CEFEPIME HYDROCHLORIDE 2 G: 2 INJECTION, SOLUTION INTRAVENOUS at 06:02

## 2017-02-14 RX ADMIN — CARVEDILOL 12.5 MG: 6.25 TABLET, FILM COATED ORAL at 09:02

## 2017-02-14 RX ADMIN — CHLORHEXIDINE GLUCONATE 15 ML: 1.2 RINSE ORAL at 09:02

## 2017-02-14 RX ADMIN — LACTOBACILLUS TAB 1 TABLET: TAB at 09:02

## 2017-02-14 RX ADMIN — ENOXAPARIN SODIUM 40 MG: 100 INJECTION SUBCUTANEOUS at 01:02

## 2017-02-14 RX ADMIN — DEXTROSE MONOHYDRATE 50 MG: 5 INJECTION, SOLUTION INTRAVENOUS at 06:02

## 2017-02-14 RX ADMIN — LEVETIRACETAM 500 MG: 500 SOLUTION ORAL at 09:02

## 2017-02-14 RX ADMIN — POLYETHYLENE GLYCOL (3350) 17 G: 17 POWDER, FOR SOLUTION ORAL at 09:02

## 2017-02-14 RX ADMIN — MINERAL SUPPLEMENT IRON 300 MG / 5 ML STRENGTH LIQUID 100 PER BOX UNFLAVORED 300 MG: at 09:02

## 2017-02-14 RX ADMIN — LACTOBACILLUS TAB 1 TABLET: TAB at 01:02

## 2017-02-14 RX ADMIN — ROSUVASTATIN CALCIUM 5 MG: 5 TABLET, FILM COATED ORAL at 09:02

## 2017-02-14 RX ADMIN — PANTOPRAZOLE SODIUM 40 MG: 40 INJECTION, POWDER, FOR SOLUTION INTRAVENOUS at 09:02

## 2017-02-14 NOTE — PROGRESS NOTES
AVS and dc orders sent to [place] via [facility] with a note to call when ready for report. Mariel Lopes LMSW

## 2017-02-14 NOTE — IP AVS SNAPSHOT
[address] [place] [address]  Phone: [phone]           Patient Discharge Instructions     Our goal is to set you up for success. This packet includes information on your condition, medications, and your home care. It will help you to care for yourself so you don't get sicker and need to go back to the hospital.     Please ask your nurse if you have any questions.        There are many details to remember when preparing to leave the hospital. Here is what you will need to do:    1. Take your medicine. If you are prescribed medications, review your Medication List in the following pages. You may have new medications to  at the pharmacy and others that you'll need to stop taking. Review the instructions for how and when to take your medications. Talk with your doctor or nurses if you are unsure of what to do.     2. Go to your follow-up appointments. Specific follow-up information is listed in the following pages. Your may be contacted by a transition nurse or clinical provider about future appointments. Be sure we have all of the phone numbers to reach you, if needed. Please contact your provider's office if you are unable to make an appointment.     3. Watch for warning signs. Your doctor or nurse will give you detailed warning signs to watch for and when to call for assistance. These instructions may also include educational information about your condition. If you experience any of warning signs to your health, call your doctor.               [facility]  Unless otherwise directed by your provider, please contact [facility], our nurse care line that is available for 24/7 assistance.     [phone] (toll-free)    Registered nurses in the [facility] provide clinical advisement, health education, appointment booking, and other advisory services.                    ** Verify the list of medication(s) below is accurate and up to date.  Carry this with you in case of emergency. If your medications have changed, please notify your healthcare provider.             Medication List      CONTINUE taking these medications        Additional Info                      acetaminophen 650 MG Tbsr   Commonly known as:  TYLENOL   Refills:  0   Dose:  650 mg    Instructions:  1 tablet (650 mg total) by Per J Tube route every 6 (six) hours as needed (fever).     Begin Date    AM    Noon    PM    Bedtime       acetylcysteine 200 mg/ml (20%) 200 mg/mL (20 %) nebulizer solution   Commonly known as:  MUCOMYST   Refills:  0   Dose:  4 mL    Last time this was given:  4 mLs on 2/15/2017  1:01 PM   Instructions:  Take 4 mLs by nebulization every 6 (six) hours.     Begin Date    AM    Noon    PM    Bedtime       albuterol 1.25 mg/3 mL Nebu   Commonly known as:  ACCUNEB   Refills:  0   Dose:  2.5 mg    Instructions:  Take 2.5 mg by nebulization every 6 (six) hours.     Begin Date    AM    Noon    PM    Bedtime       amlodipine 10 MG tablet   Commonly known as:  NORVASC   Refills:  11   Dose:  10 mg    Instructions:  1 tablet (10 mg total) by Per J Tube route once daily.     Begin Date    AM    Noon    PM    Bedtime       bisacodyl 10 mg Supp   Commonly known as:  DULCOLAX   Refills:  0   Dose:  10 mg    Instructions:  Place 1 suppository (10 mg total) rectally daily as needed (daily as needed to ensure at least one bowel movement each day).     Begin Date    AM    Noon    PM    Bedtime       carvedilol 12.5 MG tablet   Commonly known as:  COREG   Quantity:  60 tablet   Refills:  11   Dose:  12.5 mg    Last time this was given:  12.5 mg on 2/15/2017  8:45 AM   Instructions:  1 tablet (12.5 mg total) by Per J Tube route 2 (two) times daily.     Begin Date    AM    Noon    PM    Bedtime       ceFEPIme in dextrose 5% 2 gram/50 mL Pgbk   Commonly known as:  MAXIPIME   Quantity:  1000 mL   Refills:  0   Dose:  2 g   Indications:  Pneumonia    Last time this was given:  2 g on  2/15/2017 12:07 PM   Instructions:  Inject 50 mLs (2 g total) into the vein every 12 (twelve) hours.     Begin Date    AM    Noon    PM    Bedtime       chlorhexidine 0.12 % solution   Commonly known as:  PERIDEX   Refills:  0   Dose:  15 mL    Last time this was given:  15 mLs on 2/15/2017  8:45 AM   Instructions:  Use as directed 15 mLs in the mouth or throat 2 (two) times daily.     Begin Date    AM    Noon    PM    Bedtime       dextrose 5 % SolP 100 mL with tigecycline 50 mg SolR 50 mg   Refills:  0   Dose:  50 mg   Indications:  Pneumonia    Start Date:  2/28/2017   Last time this was given:  50 mg on 2/15/2017 11:45 AM   Instructions:  Inject 50 mg into the vein every 12 (twelve) hours.     Begin Date    AM    Noon    PM    Bedtime       enoxaparin 40 mg/0.4 mL Syrg   Commonly known as:  LOVENOX   Refills:  0   Dose:  40 mg    Instructions:  Inject 0.4 mLs (40 mg total) into the skin once daily.     Begin Date    AM    Noon    PM    Bedtime       famotidine 40 mg/5 mL (8 mg/mL) suspension   Commonly known as:  PEPCID   Refills:  0   Dose:  20 mg    Instructions:  2.5 mLs (20 mg total) by Per J Tube route 2 (two) times daily.     Begin Date    AM    Noon    PM    Bedtime       ferrous sulfate 300 mg (60 mg iron)/5 mL syrup   Refills:  0   Dose:  300 mg    Instructions:  5 mLs (300 mg total) by Per J Tube route 2 (two) times daily. Give with vitamin c.     Begin Date    AM    Noon    PM    Bedtime       glycopyrrolate 1 mg Tab   Commonly known as:  ROBINUL   Refills:  0   Dose:  1 mg    Last time this was given:  1 mg on 2/15/2017  2:13 PM   Instructions:  1 tablet (1 mg total) by Per J Tube route 3 (three) times daily.     Begin Date    AM    Noon    PM    Bedtime       levetiracetam 100 mg/mL Soln   Commonly known as:  KEPPRA   Refills:  0   Dose:  500 mg    Last time this was given:  500 mg on 2/15/2017  8:45 AM   Instructions:  5 mLs (500 mg total) by Per J Tube route 2 (two) times daily.     Begin Date     AM    Noon    PM    Bedtime       lidocaine (PF) 200 mg/20 mL (1 %) Syrg   Refills:  0   Dose:  2 mL    Instructions:  Take 2 mLs by nebulization every 6 (six) hours as needed (cough).     Begin Date    AM    Noon    PM    Bedtime       * miconazole NITRATE 2 % 2 % top powder   Commonly known as:  MICOTIN   Refills:  0    Instructions:  Apply topically to perineal area BID     Begin Date    AM    Noon    PM    Bedtime       * miconazole nitrate 2% 2 % Oint   Commonly known as:  MICOTIN   Refills:  0    Last time this was given:  2/15/2017  9:04 AM   Instructions:  Apply topically 2 (two) times daily. Apply to perineal areas     Begin Date    AM    Noon    PM    Bedtime       polyethylene glycol 17 gram Pwpk   Commonly known as:  GLYCOLAX   Refills:  0   Dose:  17 g    Instructions:  17 g by Per J Tube route once daily.     Begin Date    AM    Noon    PM    Bedtime       rosuvastatin 5 MG tablet   Commonly known as:  CRESTOR   Refills:  0   Dose:  5 mg    Last time this was given:  5 mg on 2/15/2017  8:45 AM   Instructions:  1 tablet (5 mg total) by Per J Tube route once daily.     Begin Date    AM    Noon    PM    Bedtime       Saccharomyces boulardii 250 mg capsule   Commonly known as:  FLORASTOR   Refills:  0   Dose:  250 mg    Instructions:  250 mg by Per G Tube route 2 (two) times daily.     Begin Date    AM    Noon    PM    Bedtime       selenium sulfide 1 % Sham   Commonly known as:  SELSUN   Refills:  0    Instructions:  Three times weekly with bath.  Apply to hair.     Begin Date    AM    Noon    PM    Bedtime       * Notice:  This list has 2 medication(s) that are the same as other medications prescribed for you. Read the directions carefully, and ask your doctor or other care provider to review them with you.         Where to Get Your Medications      Information about where to get these medications is not yet available     ! Ask your nurse or doctor about these medications     ceFEPIme in dextrose 5% 2  gram/50 mL Pgbk    dextrose 5 % SolP 100 mL with tigecycline 50 mg SolR 50 mg                  Please bring to all follow up appointments:    1. A copy of your discharge instructions.  2. All medicines you are currently taking in their original bottles.  3. Identification and insurance card.    Please arrive 15 minutes ahead of scheduled appointment time.    Please call 24 hours in advance if you must reschedule your appointment and/or time.        Follow-up Information     Follow up with Marcial Avila MD.    Specialty:  Family Medicine    Contact information:    [address] [place] [address]  [phone]          Follow up with [facility] [place].    Specialties:  SNF Agency, Nursing Home Agency    Why:  longterm, Nursing Home    Contact information:    [address] [place] [address]  [phone]          Discharge Instructions     Future Orders    Call MD for:     Comments:    For worsening symptoms, chest pain, shortness of breath, increased abdominal pain, high grade fever, stroke or stroke like symptoms, immediately go to the nearest Emergency Room or call 911 as soon as possible.    Diet general     Comments:    J-tube feeding: Diabetasource AC at 65 /hr with 60 cc free water flushes q 4 hrs    Questions:    Total calories:      Fat restriction, if any:      Protein restriction, if any:      Na restriction, if any:      Fluid restriction:      Additional restrictions:      Other restrictions (specify):     Scheduling Instructions:    Rotate patient q 2 hrs    Comments:    Fall precautions        Discharge Instructions       Admit to [facility]     Dx-   Patient Active Problem List    Diagnosis Date Noted    Lower GI bleed 02/14/2017    Pneumonia of both lower lobes due to infectious organism 02/04/2017    Sepsis 02/02/2017    Transaminitis 11/21/2016    Acute cystitis with hematuria 11/20/2016    Chronic respiratory failure 11/20/2016     "Malfunctioning jejunostomy tube 11/04/2016    Aspiration into airway 10/18/2016    Abnormal CXR 10/18/2016    Acute on chronic respiratory failure with hypercapnia 10/17/2016    Gastrostomy status 10/17/2016    Protein-calorie malnutrition, moderate 10/17/2016    Seizure disorder 10/17/2016    History of CVA (cerebrovascular accident) 10/17/2016    Anoxic brain injury 10/17/2016    Pneumonia due to Escherichia coli 09/13/2016    Anemia, chronic disease 09/04/2016    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    Hemiplegia as late effect of stroke 12/06/2015    Tracheostomy in place 12/06/2015    Functional quadriplegia 12/06/2015    Jejunostomy tube in situ 12/06/2015    Aortic dissection distal to left subclavian 11/05/2015       Diet- NPO     TF- Diabetasource AC @65cc/hr with 60cc H2O flushes q4  Aspiration Precautions   Keep HOB @30 degrees      Vent Mode: A/C  Oxygen Concentration (%): [30] 30  Resp Rate Total: [22 br/min-26 br/min] 22 br/min  Vt Set: [500 mL] 500 mL  PEEP/CPAP: [5 cmH20] 5 cmH20  Pressure Support: [0 cmH20] 0 cmH20  Mean Airway Pressure: [10 agR82-13 cmH20] 11 cmH20     PICC line care and dressing changes per protocol      Turn q2 while in bed     Tygacil 50mg IV q12 and Cefepime 2gm IV q12 x13 days; stop date- 2/28/17; Dx- Pneumonia due to Escherichia coli             Primary Diagnosis     Your primary diagnosis was:  Lower Intestinal Bleeding      Admission Information     Date & Time Provider Department CSN    2/14/2017  6:06 PM Socorro Chicas MD [facility] [id number]      Care Providers     Provider Role Specialty Primary office phone    Socorro Chicas MD Attending Provider Internal Medicine [phone]    PROCEDURES, GASTROENTEROLOGY Consulting Physician  -- Number not on file      Your Vitals Were     BP Pulse Temp Resp Height Weight    100/75 72 98.6 °F (37 °C) (Axillary) 18 5' 9" (1.753 m) 66.7 kg (147 lb 0.8 oz)    SpO2 BMI             100% 21.72 kg/m2       "   Recent Lab Values        11/5/2015 8/20/2016                        8:29 PM 12:30 AM          A1C 5.1 5.6          Comment for A1C at 12:30 AM on 8/20/2016:  According to ADA guidelines, hemoglobin A1C <7.0% represents  optimal control in non-pregnant diabetic patients.  Different  metrics may apply to specific populations.   Standards of Medical Care in Diabetes - 2016.  For the purpose of screening for the presence of diabetes:  <5.7%     Consistent with the absence of diabetes  5.7-6.4%  Consistent with increasing risk for diabetes   (prediabetes)  >or=6.5%  Consistent with diabetes  Currently no consensus exists for use of hemoglobin A1C  for diagnosis of diabetes for children.        Allergies as of 2/15/2017     No Known Allergies      Advance Directives     An advance directive is a document which, in the event you are no longer able to make decisions for yourself, tells your healthcare team what kind of treatment you do or do not want to receive, or who you would like to make those decisions for you.  If you do not currently have an advance directive, [facility] encourages you to create one.  For more information call:  [phone] ([phone]), [phone] ([phone]),  or log on to [email or web address].        Language Assistance Services     ATTENTION: Language assistance services are available, free of charge. Please call [phone].      ATENCIÓN: Si habla español, tiene a small disposición servicios gratuitos de asistencia lingüística. Llame al [phone].     CHÚ Ý: N?u b?n nói Ti?ng Vi?t, có các d?ch v? h? tr? ngôn ng? mi?n phí dành cho b?n. G?i s? [phone].        Pneumonmia Discharge Instructions                [facility] Sign[facility]Up     Activating your [facility] account is as easy as 1-2-3!     1) Visit [email or web address], select Sign Up Now, enter this activation code and your date of birth, then select Next.  [email or web address]  Expires: 3/31/2017  2:07 PM      2) Create a username  and password to use when you visit [facility] in the future and select a security question in case you lose your password and select Next.    3) Enter your e-mail address and click Sign Up!    Additional Information  If you have questions, please e-mail [email or web address] or call [phone] to talk to our [email or web address] staff. Remember, [facility] is NOT to be used for urgent needs. For medical emergencies, dial 911.          [facility] complies with applicable Federal civil rights laws and does not discriminate on the basis of race, color, national origin, age, disability, or sex.

## 2017-02-14 NOTE — PROGRESS NOTES
Progress Note  Infectious Disease    Admit Date: 2/2/2017   LOS: 11 days     SUBJECTIVE:     Follow-up For:  Pneumonia     Antibiotics     Start     Stop Route Frequency Ordered    02/09/17 1830  ceFEPIme in dextrose 5% 2 gram/50 mL IVPB 2 g      -- IV Every 12 hours (non-standard times) 02/09/17 1746    02/10/17 0600  tigecycline (TYGACIL) 50 mg in dextrose 5 % 100 mL IVPB      -- IV Every 12 hours (non-standard times) 02/09/17 1746          Review of Systems:  Unable to obtain     OBJECTIVE:     Vital Signs (Most Recent)  Temp: 97.8 °F (36.6 °C) (02/13/17 1600)  Pulse: 64 (02/13/17 2147)  Resp: (!) 22 (02/13/17 1800)  BP: 126/78 (02/13/17 2147)  SpO2: 97 % (02/13/17 1800)    Temperature Range Min/Max (Last 24H):  Temp:  [97.8 °F (36.6 °C)-98.9 °F (37.2 °C)]     I & O (Last 24H):  Intake/Output Summary (Last 24 hours) at 02/13/17 2152  Last data filed at 02/13/17 1756   Gross per 24 hour   Intake             1820 ml   Output             1750 ml   Net               70 ml       Physical Exam:  Sleeping, unresponsive  trached an on vent ,   rrr no m/g/r   Lungs are cta anteriorly   abd soft peg and j tube   Upper and lower extremity contractures, no skin changes V    Lines/Drains:       PICC Double Lumen 02/12/17 1340 left basilic (Active)   Site Assessment Clean;Dry;Intact 2/13/2017  6:15 PM   Lumen 1 Status Normal saline locked 2/13/2017  6:15 PM   Lumen 2 Status Infusing 2/13/2017  6:15 PM   Dressing Type Transparent 2/13/2017  6:15 PM   Dressing Status Biopatch in place;Clean;Dry;Intact 2/13/2017  6:15 PM   Dressing Change Due 02/19/17 2/13/2017  6:15 PM   Daily Line Review Performed 2/13/2017  4:20 PM            Peripheral IV - Single Lumen 02/10/17 1915 Right Hand (Active)   Site Assessment Clean;Dry;Intact 2/13/2017  6:15 PM   Line Status Saline locked 2/13/2017  6:15 PM   Dressing Status Clean;Dry;Intact 2/13/2017  6:15 PM       Laboratory:  CBC    Recent Labs  Lab 02/13/17  0420   WBC 7.70   RBC 3.43*   HGB  9.0*   HCT 28.0*      MCV 82   MCH 26.1*   MCHC 32.0     BMP    Recent Labs  Lab 02/13/17  0420   *   K 3.7      CO2 24   BUN 27*   CREATININE 0.7   CALCIUM 8.2*     cxr improved     ASSESSMENT/PLAN:     Active Hospital Problems    Diagnosis  POA    *Pneumonia due to Escherichia coli [J15.5]  Yes    Pneumonia of both lower lobes due to infectious organism [J18.9]  Yes    Sepsis [A41.9]  Yes    Chronic respiratory failure [J96.10]  Yes    Gastrostomy status [Z93.1]  Not Applicable    Protein-calorie malnutrition, moderate [E44.0]  Yes    Anoxic brain injury [G93.1]  Yes    History of CVA (cerebrovascular accident) [Z86.73]  Not Applicable    Seizure disorder [G40.909]  Yes    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   Diarhea - cdiff neative     - Continue Tigecycline and Cefepime x 10 more days at [place].   - If not accepted, will come up with an alternate regimen.   - will sign off. Please call with questions.

## 2017-02-14 NOTE — PLAN OF CARE
Problem: Patient Care Overview  Goal: Plan of Care Review  Outcome: Ongoing (interventions implemented as appropriate)  Trach.  Vent dependant.  Afebrile.  PEG and Jtube intact.  PEG to gravity drainage bag.  Tolerating tube feeds.  Zelaya intact, adequate urine output.  Safety maintained.  Isolations precautions maintained.

## 2017-02-14 NOTE — PROGRESS NOTES
02/14/17 0752   Patient Assessment/Suction   Level of Consciousness (AVPU) responds to voice   Expansion/Accessory Muscles/Retractions no use of accessory muscles   SERGIO Breath Sounds coarse   LLL Breath Sounds diminished   RUL Breath Sounds coarse   RML Breath Sounds diminished   RLL Breath Sounds diminished   Rhythm/Pattern, Respiratory ventilator assisted   Cough Frequency with stimulation   Cough Type good;productive   Suction Method required;in-line suction catheter (closed);tracheostomy   Sputum Amount large   Sputum Color yellow, pale   Sputum Consistency thick   PRE-TX-O2-ETCO2   O2 Device (Oxygen Therapy) ventilator   $ Is the patient on Oxygen? Yes   Oxygen Concentration (%) 30   SpO2 99 %   Pulse Oximetry Type Continuous   $ Pulse Oximetry - Multiple Charge Pulse Oximetry - Multiple   ETCO2 (mmHg) 28 mmHg   $ ETCO2 Charge Exhaled CO2 Monitoring   Pulse 65   Resp 20       Surgical Airway Shiley Long;Distal;Cuffed   No Placement Date or Time found.   Present Prior to Hospital Arrival?: Yes  Type: Tracheostomy  Brand: Shiley  Airway Device Size: 7.0  Style: Long;Distal;Cuffed   Status Secured   Site Assessment Clean;Dry;No bleeding;No drainage   Ties Assessment Clean   Vent Select   Conventional Vent Y   IHI Ventilator Associated Pneumonia Bundle   Daily Awakening Trials Performed Not applicable   Daily Assessment of Readiness to Extubate No (Comment)   Head of Bed Elevated  HOB 30   Preset Conventional Ventilator Settings   Vent Type    Ventilation Type VC   Vent Mode A/C   Humidity HME   Set Rate 22 bmp   Vt Set 500 mL   PEEP/CPAP 5 cmH20   Pressure Support 0 cmH20   Waveform RAMP   Peak Flow 70 L/min   Set Inspiratory Pressure 0 cmH20   Insp Time 0 Sec(s)   Plateau Set/Insp. Hold (sec) 0   Insp Rise Time  0 %   Trigger Sensitivity Flow/I-Trigger 1.5 L/min   P High 0 cm H2O   P Low 0 cm H2O   T High 0 sec   T Low 0 sec   Patient Ventilator Parameters   Resp Rate Total 22 br/min   Peak Airway Pressure  24 cmH2O   Mean Airway Pressure 11 cmH20   Exhaled Vt 513 mL   Total Ve 11.3 mL   Spont Ve 0 L   I:E Ratio Measured 1:2.50   Conventional Ventilator Alarms   Ve High Alarm 24 L/min   Resp Rate High Alarm 40 br/min   Press High Alarm 60 cmH2O   Apnea Rate 10   Apnea Volume (mL) 500 mL   Apnea Oxygen Concentration  100   Apnea Flow Rate (L/min) 60   T Apnea 20 sec(s)   Ready to Wean/Extubation Screen   FIO2<60 (chart decimal) 0.3   MV<16L (chart vol.) 11.3   PEEP <10 (chart #) 5   Ready to Wean Parameters   F/VT Ratio<105 (RSBI) (!) 38.99

## 2017-02-14 NOTE — DISCHARGE SUMMARY
"Discharge Summary  Hospital Medicine    Admit Date: 2/2/2017    Date and Time: 2/14/201710:36 AM    Discharge Attending Physician: Socorro Chicas MD    Primary Care Physician: Marcial Avila MD    Diagnoses:  Active Hospital Problems    Diagnosis  POA    *Pneumonia due to Escherichia coli [J15.5]  Yes    Pneumonia of both lower lobes due to infectious organism [J18.9]  Yes    Sepsis [A41.9]  Yes    Chronic respiratory failure [J96.10]  Yes    Gastrostomy status [Z93.1]  Not Applicable    Protein-calorie malnutrition, moderate [E44.0]  Yes    Anoxic brain injury [G93.1]  Yes    History of CVA (cerebrovascular accident) [Z86.73]  Not Applicable    Seizure disorder [G40.909]  Yes    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     Discharged Condition: Fair    Hospital Course:   Patient is a 66 y.o. male admitted to Hospitalist Service from [facility] with complaint of "Pneumonia". Patient has PMH significant for anoxic brain injury, hypertension, seizures, hyperlipidemia, chronic respiratory failure, history of alcohol and drug abuse, history of CVA and DM-2. EMS reported patient had worsening chest XRs which showed bilateral pneumonia associated with fever. Patient was sent over for further evaluation of possible aspiration. Patient not able to communicate. Looked ill and exhausted. Patient was admitted to Hospitalist medicine service. Patient was evaluated by Dr. Noonan and Dr. Myrick. Patient was treated with IV antibiotics for pneumonia. Fever improved. Sepsis resolved. Patient was managed in ICU. Patient was discharged to [facility] in stable condition with following discharge plan of care. Total time with the patient was 30 minutes and greater than 50% " was spent in counseling and coordination of care. The assessment and plan have been discussed at length. Physicians' notes reviewed. Labs and procedure reviewed.     Consults: Dr. Noonan and Dr. yMrick    Significant Diagnostic Studies:   Chest X-Ray: 1.  Stable radiographic appearance to the chest.  Persistent bilateral pulmonary opacities are noted for which differential considerations include pneumonia and aspiration in this patient with history of esophageal stent.  Small left pleural effusion noted.  2.  Stable dilatation of the aorta in this patient with known type B thoracic dissection.    CXR: Stable examination demonstrating patchy bilateral airspace disease likely reflecting pneumonia.    CXR: Stable positioning of support devices. Patchy bilateral infiltrates without significant change.  New small left pleural effusion.    CXR: Continued right upper lobe pneumonia.  Infiltrate and/or atelectasis of the left lower lobe.  Tracheostomy in place    CXR: Appropriately positioned left PICC line.  Persistent bilateral infiltrates and small left pleural effusion.    Microbiology Results (last 7 days)     Procedure Component Value Units Date/Time    C Diff Toxin by PCR [[id number]] Collected:  02/12/17 1415    Order Status:  Completed Updated:  02/13/17 0615     C. diff PCR Negative    Clostridium difficile EIA [[id number]]  (Abnormal) Collected:  02/12/17 1415    Order Status:  Completed Specimen:  Stool from Stool Updated:  02/12/17 2143     C. diff Antigen Positive (A)     C difficile Toxins A+B, EIA Negative      Testing not recommended for children <24 months old.       Blood culture [[id number]] Collected:  02/06/17 0601    Order Status:  Completed Specimen:  Blood from Peripheral, Left  Hand Updated:  02/11/17 1212     Blood Culture, Routine No growth after 5 days.    Blood culture [[id number]] Collected:  02/06/17 0628    Order Status:  Completed Specimen:  Blood from Peripheral, Left  Hand Updated:  02/11/17 1212      Blood Culture, Routine No growth after 5 days.    Urine culture [[id number]] Collected:  02/08/17 1843    Order Status:  Completed Specimen:  Urine from Urine, Catheterized Updated:  02/10/17 0716     Urine Culture, Routine No growth    Blood culture [[id number]] Collected:  02/03/17 1830    Order Status:  Completed Specimen:  Blood Updated:  02/09/17 0612     Blood Culture, Routine No growth after 5 days.    Blood culture #1 **CANNOT BE ORDERED STAT** [[id number]] Collected:  02/02/17 1613    Order Status:  Completed Specimen:  Blood Updated:  02/07/17 2322     Blood Culture, Routine No growth after 5 days.    Blood culture #2 **CANNOT BE ORDERED STAT** [[id number]] Collected:  02/02/17 1647    Order Status:  Completed Specimen:  Blood Updated:  02/07/17 2322     Blood Culture, Routine No growth after 5 days.        Special Treatments/Procedures: None  Disposition: [facility]    Medications:  Reconciled Home Medications: Current Discharge Medication List      START taking these medications    Details   ceFEPIme in dextrose 5% (MAXIPIME) 2 gram/50 mL PgBk Inject 50 mLs (2 g total) into the vein every 12 (twelve) hours.  Qty: 1000 mL, Refills: 0      dextrose 5 % SolP 100 mL with tigecycline 50 mg SolR 50 mg Inject 50 mg into the vein every 12 (twelve) hours.      enoxaparin (LOVENOX) 40 mg/0.4 mL Syrg Inject 0.4 mLs (40 mg total) into the skin once daily.      famotidine (PEPCID) 40 mg/5 mL (8 mg/mL) suspension 2.5 mLs (20 mg total) by Per J Tube route 2 (two) times daily.  Refills: 0      miconazole nitrate 2% (MICOTIN) 2 % Oint Apply topically 2 (two) times daily. Apply to perineal areas  Refills: 0         CONTINUE these medications which have NOT CHANGED    Details   acetaminophen (TYLENOL) 650 MG TbSR 1 tablet (650 mg total) by Per J Tube route every 6 (six) hours as needed (fever).  Refills: 0      acetylcysteine 200 mg/ml, 20%, (MUCOMYST) 200 mg/mL (20 %) nebulizer solution Take 4 mLs by  nebulization every 6 (six) hours.       albuterol (ACCUNEB) 1.25 mg/3 mL Nebu Take 2.5 mg by nebulization every 6 (six) hours.      amlodipine (NORVASC) 10 MG tablet 1 tablet (10 mg total) by Per J Tube route once daily.  Refills: 11      bisacodyl (DULCOLAX) 10 mg Supp Place 1 suppository (10 mg total) rectally daily as needed (daily as needed to ensure at least one bowel movement each day).  Refills: 0      carvedilol (COREG) 12.5 MG tablet 1 tablet (12.5 mg total) by Per J Tube route 2 (two) times daily.  Qty: 60 tablet, Refills: 11      chlorhexidine (PERIDEX) 0.12 % solution Use as directed 15 mLs in the mouth or throat 2 (two) times daily.  Refills: 0      ferrous sulfate 300 mg (60 mg iron)/5 mL syrup 5 mLs (300 mg total) by Per J Tube route 2 (two) times daily. Give with vitamin c.  Refills: 0      glycopyrrolate (ROBINUL) 1 mg Tab 1 tablet (1 mg total) by Per J Tube route 3 (three) times daily.      levetiracetam (KEPPRA) 100 mg/mL Soln 5 mLs (500 mg total) by Per J Tube route 2 (two) times daily.      LIDOCAINE HCL/PF (LIDOCAINE, PF,) 200 mg/20 mL (1 %) Syrg Take 2 mLs by nebulization every 6 (six) hours as needed (cough).      miconazole NITRATE 2 % (MICOTIN) 2 % top powder Apply topically to perineal area BID  Refills: 0      polyethylene glycol (GLYCOLAX) 17 gram PwPk 17 g by Per J Tube route once daily.  Refills: 0      rosuvastatin (CRESTOR) 5 MG tablet 1 tablet (5 mg total) by Per J Tube route once daily.      Saccharomyces boulardii (FLORASTOR) 250 mg capsule 250 mg by Per G Tube route 2 (two) times daily.      selenium sulfide (SELSUN) 1 % Sham Three times weekly with bath.  Apply to hair.             Discharge Procedure Orders  Diet general   Order Comments: J-tube feeding: Dibetasource AC at 65 cc/hr via J- tube.  Water 60 cc flushes q 6 hrs     Other restrictions (specify):   Order Comments: Fall precautions   Scheduling Instructions: Skin care - rotate patient q 2 hrs     Call MD for:   Order  Comments: For worsening symptoms, chest pain, shortness of breath, increased abdominal pain, high grade fever, stroke or stroke like symptoms, immediately go to the nearest Emergency Room or call 911 as soon as possible.       Follow-up Information     Follow up with [facility] Delia Pagan.    Specialties:  SNF Agency, Nursing Home Agency    Why:  Nursing Home, jail    Contact information:    [address] [address]  [phone]          Follow up with Marcial Avila MD In 1 week.    Specialty:  Family Medicine    Contact information:    [address] [address]  [phone]

## 2017-02-15 VITALS
TEMPERATURE: 99 F | SYSTOLIC BLOOD PRESSURE: 100 MMHG | HEART RATE: 72 BPM | BODY MASS INDEX: 21.78 KG/M2 | DIASTOLIC BLOOD PRESSURE: 75 MMHG | WEIGHT: 147.06 LBS | RESPIRATION RATE: 18 BRPM | HEIGHT: 69 IN | OXYGEN SATURATION: 100 %

## 2017-02-15 LAB
ANION GAP SERPL CALC-SCNC: 7 MMOL/L
APTT BLDCRRT: 34.6 SEC
BASOPHILS # BLD AUTO: 0.1 K/UL
BASOPHILS # BLD AUTO: 0.1 K/UL
BASOPHILS NFR BLD: 0.8 %
BASOPHILS NFR BLD: 0.9 %
BUN SERPL-MCNC: 25 MG/DL
CALCIUM SERPL-MCNC: 8.2 MG/DL
CHLORIDE SERPL-SCNC: 106 MMOL/L
CO2 SERPL-SCNC: 22 MMOL/L
CREAT SERPL-MCNC: 0.7 MG/DL
DIFFERENTIAL METHOD: ABNORMAL
DIFFERENTIAL METHOD: ABNORMAL
EOSINOPHIL # BLD AUTO: 0.1 K/UL
EOSINOPHIL # BLD AUTO: 0.2 K/UL
EOSINOPHIL NFR BLD: 1.8 %
EOSINOPHIL NFR BLD: 2.3 %
ERYTHROCYTE [DISTWIDTH] IN BLOOD BY AUTOMATED COUNT: 17.8 %
ERYTHROCYTE [DISTWIDTH] IN BLOOD BY AUTOMATED COUNT: 18.2 %
EST. GFR  (AFRICAN AMERICAN): >60 ML/MIN/1.73 M^2
EST. GFR  (NON AFRICAN AMERICAN): >60 ML/MIN/1.73 M^2
GLUCOSE SERPL-MCNC: 85 MG/DL
HCT VFR BLD AUTO: 27.2 %
HCT VFR BLD AUTO: 27.9 %
HCT VFR BLD AUTO: 27.9 %
HCT VFR BLD AUTO: 28.6 %
HGB BLD-MCNC: 8.6 G/DL
HGB BLD-MCNC: 8.7 G/DL
HGB BLD-MCNC: 8.8 G/DL
HGB BLD-MCNC: 9 G/DL
INR PPP: 1.3
LYMPHOCYTES # BLD AUTO: 3.3 K/UL
LYMPHOCYTES # BLD AUTO: 4.3 K/UL
LYMPHOCYTES NFR BLD: 45.4 %
LYMPHOCYTES NFR BLD: 47.2 %
MCH RBC QN AUTO: 25.5 PG
MCH RBC QN AUTO: 25.5 PG
MCHC RBC AUTO-ENTMCNC: 31.2 %
MCHC RBC AUTO-ENTMCNC: 31.4 %
MCV RBC AUTO: 81 FL
MCV RBC AUTO: 82 FL
MONOCYTES # BLD AUTO: 0.6 K/UL
MONOCYTES # BLD AUTO: 0.7 K/UL
MONOCYTES NFR BLD: 7.9 %
MONOCYTES NFR BLD: 8.1 %
NEUTROPHILS # BLD AUTO: 3.2 K/UL
NEUTROPHILS # BLD AUTO: 3.8 K/UL
NEUTROPHILS NFR BLD: 41.7 %
NEUTROPHILS NFR BLD: 43.9 %
PLATELET # BLD AUTO: 333 K/UL
PLATELET # BLD AUTO: 390 K/UL
PMV BLD AUTO: 7.9 FL
PMV BLD AUTO: 7.9 FL
POCT GLUCOSE: 100 MG/DL (ref 70–110)
POTASSIUM SERPL-SCNC: 4.2 MMOL/L
PROTHROMBIN TIME: 13.4 SEC
RBC # BLD AUTO: 3.42 M/UL
RBC # BLD AUTO: 3.53 M/UL
SODIUM SERPL-SCNC: 135 MMOL/L
WBC # BLD AUTO: 7.2 K/UL
WBC # BLD AUTO: 9.2 K/UL

## 2017-02-15 PROCEDURE — C9113 INJ PANTOPRAZOLE SODIUM, VIA: HCPCS | Performed by: NURSE PRACTITIONER

## 2017-02-15 PROCEDURE — 63600175 PHARM REV CODE 636 W HCPCS: Performed by: NURSE PRACTITIONER

## 2017-02-15 PROCEDURE — 27000221 HC OXYGEN, UP TO 24 HOURS

## 2017-02-15 PROCEDURE — 94770 HC EXHALED C02 TEST: CPT

## 2017-02-15 PROCEDURE — 63600175 PHARM REV CODE 636 W HCPCS: Performed by: INTERNAL MEDICINE

## 2017-02-15 PROCEDURE — 85018 HEMOGLOBIN: CPT | Mod: 91

## 2017-02-15 PROCEDURE — 85730 THROMBOPLASTIN TIME PARTIAL: CPT

## 2017-02-15 PROCEDURE — 99900026 HC AIRWAY MAINTENANCE (STAT)

## 2017-02-15 PROCEDURE — 85014 HEMATOCRIT: CPT | Mod: 91

## 2017-02-15 PROCEDURE — 94640 AIRWAY INHALATION TREATMENT: CPT

## 2017-02-15 PROCEDURE — 94003 VENT MGMT INPAT SUBQ DAY: CPT

## 2017-02-15 PROCEDURE — 85610 PROTHROMBIN TIME: CPT

## 2017-02-15 PROCEDURE — 36415 COLL VENOUS BLD VENIPUNCTURE: CPT

## 2017-02-15 PROCEDURE — 94761 N-INVAS EAR/PLS OXIMETRY MLT: CPT

## 2017-02-15 PROCEDURE — 25000003 PHARM REV CODE 250: Performed by: INTERNAL MEDICINE

## 2017-02-15 PROCEDURE — 25000003 PHARM REV CODE 250: Performed by: NURSE PRACTITIONER

## 2017-02-15 PROCEDURE — 80048 BASIC METABOLIC PNL TOTAL CA: CPT

## 2017-02-15 PROCEDURE — 85025 COMPLETE CBC W/AUTO DIFF WBC: CPT | Mod: 91

## 2017-02-15 PROCEDURE — 25000242 PHARM REV CODE 250 ALT 637 W/ HCPCS: Performed by: NURSE PRACTITIONER

## 2017-02-15 PROCEDURE — 99239 HOSP IP/OBS DSCHRG MGMT >30: CPT | Mod: ,,, | Performed by: INTERNAL MEDICINE

## 2017-02-15 PROCEDURE — 99232 SBSQ HOSP IP/OBS MODERATE 35: CPT | Mod: ,,, | Performed by: INTERNAL MEDICINE

## 2017-02-15 RX ORDER — CEFEPIME HYDROCHLORIDE 2 G/50ML
2 INJECTION, SOLUTION INTRAVENOUS EVERY 12 HOURS
Status: DISCONTINUED | OUTPATIENT
Start: 2017-02-15 | End: 2017-02-15 | Stop reason: HOSPADM

## 2017-02-15 RX ORDER — CEFEPIME HYDROCHLORIDE 2 G/50ML
2 INJECTION, SOLUTION INTRAVENOUS EVERY 12 HOURS
Qty: 1000 ML | Refills: 0
Start: 2017-02-15 | End: 2017-02-28

## 2017-02-15 RX ORDER — ACETYLCYSTEINE 200 MG/ML
4 SOLUTION ORAL; RESPIRATORY (INHALATION) EVERY 6 HOURS
Status: DISCONTINUED | OUTPATIENT
Start: 2017-02-15 | End: 2017-02-15 | Stop reason: HOSPADM

## 2017-02-15 RX ADMIN — GLYCOPYRROLATE 1 MG: 1 TABLET ORAL at 08:02

## 2017-02-15 RX ADMIN — CEFEPIME HYDROCHLORIDE 2 G: 2 INJECTION, SOLUTION INTRAVENOUS at 12:02

## 2017-02-15 RX ADMIN — GLYCOPYRROLATE 1 MG: 1 TABLET ORAL at 02:02

## 2017-02-15 RX ADMIN — CHLORHEXIDINE GLUCONATE 15 ML: 1.2 RINSE ORAL at 01:02

## 2017-02-15 RX ADMIN — PANTOPRAZOLE SODIUM 40 MG: 40 INJECTION, POWDER, FOR SOLUTION INTRAVENOUS at 08:02

## 2017-02-15 RX ADMIN — ACETYLCYSTEINE 4 ML: 200 INHALANT RESPIRATORY (INHALATION) at 07:02

## 2017-02-15 RX ADMIN — MICONAZOLE NITRATE: 20 OINTMENT TOPICAL at 09:02

## 2017-02-15 RX ADMIN — ROSUVASTATIN CALCIUM 5 MG: 5 TABLET, FILM COATED ORAL at 08:02

## 2017-02-15 RX ADMIN — IPRATROPIUM BROMIDE AND ALBUTEROL SULFATE 3 ML: .5; 3 SOLUTION RESPIRATORY (INHALATION) at 01:02

## 2017-02-15 RX ADMIN — CARVEDILOL 12.5 MG: 6.25 TABLET, FILM COATED ORAL at 01:02

## 2017-02-15 RX ADMIN — CEFEPIME HYDROCHLORIDE 2 G: 2 INJECTION, SOLUTION INTRAVENOUS at 01:02

## 2017-02-15 RX ADMIN — ACETYLCYSTEINE 4 ML: 200 INHALANT RESPIRATORY (INHALATION) at 01:02

## 2017-02-15 RX ADMIN — IPRATROPIUM BROMIDE AND ALBUTEROL SULFATE 3 ML: .5; 3 SOLUTION RESPIRATORY (INHALATION) at 07:02

## 2017-02-15 RX ADMIN — DEXTROSE MONOHYDRATE 50 MG: 50 INJECTION, SOLUTION INTRAVENOUS at 11:02

## 2017-02-15 RX ADMIN — CARVEDILOL 12.5 MG: 6.25 TABLET, FILM COATED ORAL at 08:02

## 2017-02-15 RX ADMIN — CHLORHEXIDINE GLUCONATE 15 ML: 1.2 RINSE ORAL at 08:02

## 2017-02-15 RX ADMIN — MICONAZOLE NITRATE: 20 OINTMENT TOPICAL at 01:02

## 2017-02-15 RX ADMIN — DEXTROSE MONOHYDRATE 50 MG: 50 INJECTION, SOLUTION INTRAVENOUS at 01:02

## 2017-02-15 RX ADMIN — LEVETIRACETAM 500 MG: 500 SOLUTION ORAL at 08:02

## 2017-02-15 RX ADMIN — LEVETIRACETAM 500 MG: 500 SOLUTION ORAL at 01:02

## 2017-02-15 NOTE — PROGRESS NOTES
02/15/17 0100   Patient Assessment/Suction   Level of Consciousness (AVPU) alert   Respiratory Effort Unlabored   Expansion/Accessory Muscles/Retractions no use of accessory muscles   All Lung Fields Breath Sounds coarse   Rhythm/Pattern, Respiratory ventilator assisted   Cough Frequency frequent   Cough Type good;productive   Suction Method in-line suction catheter (closed);tracheostomy   $ Suction Charges Airway Maintenance Stat Charge   Sputum Amount moderate   Sputum Color white   Sputum Consistency thick   PRE-TX-O2-ETCO2   O2 Device (Oxygen Therapy) ventilator   $ Is the patient on Oxygen? Yes   Oxygen Concentration (%) 40   SpO2 99 %   Pulse Oximetry Type Continuous   $ Pulse Oximetry - Multiple Charge Pulse Oximetry - Multiple   Pulse 77   Resp (!) 24   Aerosol Therapy   $ Aerosol Therapy Charges Aerosol Treatment   Respiratory Treatment Status given   SVN/Inhaler Treatment Route in-line;trach tube   Position During Treatment HOB at 20-30 degrees   Patient Tolerance good   Post-Treatment   Post-treatment Heart Rate (beats/min) 80   Post-treatment Resp Rate (breaths/min) 22   All Fields Breath Sounds aeration increased   Wound Care   Skin Color/Characteristics without discoloration   Skin Temperature warm       Surgical Airway Shiley Long;Distal;Cuffed   No Placement Date or Time found.   Present Prior to Hospital Arrival?: Yes  Type: Tracheostomy  Brand: Shiley  Airway Device Size: 7.0  Style: Long;Distal;Cuffed   Cuff Pressure 30 cm H2O   Status Secured   Site Assessment Clean;Dry   Site Care Cleansed;Dried;Dressing applied   Inner Cannula Care Cleansed/dried   Ties Assessment Clean;Dry;Intact   Preset Conventional Ventilator Settings   Vent Type    Ventilation Type VC   Vent Mode A/C   Humidity HME   Set Rate 18 bmp   Vt Set 450 mL   PEEP/CPAP 5 cmH20   Pressure Support 0 cmH20   Waveform RAMP   Peak Flow 60 L/min   Set Inspiratory Pressure 0 cmH20   Insp Time 0 Sec(s)   Plateau Set/Insp. Hold (sec)  0   Insp Rise Time  0 %   Trigger Sensitivity Flow/I-Trigger 2 L/min   P High 0 cm H2O   P Low 0 cm H2O   T High 0 sec   T Low 0 sec   Patient Ventilator Parameters   Resp Rate Total 21 br/min   Peak Airway Pressure 23 cmH2O   Mean Airway Pressure 10 cmH20   Exhaled Vt 511 mL   Total Ve 10.1 mL   Spont Ve 0 L   I:E Ratio Measured 1:2.40   Conventional Ventilator Alarms   Alarms On Y   Ve High Alarm 26.5 L/min   Resp Rate High Alarm 0 br/min   Press High Alarm 60 cmH2O   Apnea Rate 10   Apnea Volume (mL) 540 mL   Apnea Oxygen Concentration  100   Apnea Flow Rate (L/min) 65   T Apnea 20 sec(s)   Ready to Wean/Extubation Screen   FIO2<60 (chart decimal) 0.4   MV<16L (chart vol.) 10.1   PEEP <10 (chart #) 5   Ready to Wean Parameters   F/VT Ratio<105 (RSBI) (!) 46.97

## 2017-02-15 NOTE — ASSESSMENT & PLAN NOTE
H&H consistent with baseline despite acute rectal bleeding.  Monitor serial CBC and transfuse if patient becomes hemodynamically unstable, symptomatic or H/H drops below 7/21.

## 2017-02-15 NOTE — ED PROVIDER NOTES
Encounter Date: 2/14/2017    SCRIBE #1 NOTE: IBobby, lev scribing for, and in the presence of, Dr. Hardy.       History     Chief Complaint   Patient presents with    Rectal Bleeding     On lovenox and rectal tube pulled today. Bleeding getting worse throughout the day     Review of patient's allergies indicates:  No Known Allergies  HPI Comments: 02/14/2017  7:18 PM     Chief Complaint: Rectal Bleeding      The patient is a 66 y.o. male with a PMHx of on Lovenox; anoxic brain injury; CKD; decubitus ulcer of ankle, unstageable; decubitus ulcer of sacral region, stage 3; decubitus ulcer, unstageable; DM; dissecting aneurysm of thoracoabdominal aorta; encounter for blood transfusion; h/o renal calculi; history of ATN; HLD; HTN; seizures; severe anemia; stroke; and ventilator dependent who is presenting via EMS with an acute onset of progressively worsening rectal bleeding that started today after his rectal tube was removed. Pt just discharged today for sepsis and PNA. Pt has a past surgical history that includes Tracheostomy tube placement; Gastrostomy tube placement; Colonoscopy; and Jejunostomy.      The history is provided by medical records.     Past Medical History   Diagnosis Date    Anoxic brain injury 7/25/2015     during cardiac arrest    Chronic kidney disease     Decubitus ulcer of ankle, unstageable      outer right ankle    Decubitus ulcer of sacral region, stage 3     Decubitus ulcer, unstageable      left ear upper with blister to lower ear lobe    Diabetes mellitus      type II    Dissecting aneurysm of thoracoabdominal aorta 7/25/2015    Encounter for blood transfusion     H/O renal calculi     History of ATN      r/t hypoperfusion    Hyperlipidemia     Hypertension     Seizures     Severe anemia     Stroke 7/25/2015     bilateral watershed infarcts    Ventilator dependent      No past medical history pertinent negatives.  Past Surgical History   Procedure Laterality Date     Tracheostomy tube placement      Gastrostomy tube placement      Colonoscopy N/A 5/16/2016     Procedure: COLONOSCOPY;  Surgeon: Wei Moncada MD;  [facility];  Service: Endoscopy;  Laterality: N/A;    Jejunostomy       History reviewed. No pertinent family history.  Social History   Substance Use Topics    Smoking status: Never Smoker    Smokeless tobacco: None    Alcohol use No     Review of Systems   Unable to perform ROS: Patient nonverbal       Physical Exam   Initial Vitals   BP Pulse Resp Temp SpO2   02/14/17 1810 02/14/17 1810 02/14/17 1810 02/14/17 1810 02/14/17 1810   105/72 78 24 99.2 °F (37.3 °C) 98 %     Physical Exam    Nursing note and vitals reviewed.  HENT:   Head: Normocephalic and atraumatic.   Neck:   Trach in place.   Cardiovascular: Normal rate, regular rhythm, normal heart sounds and intact distal pulses. Exam reveals no gallop and no friction rub.    No murmur heard.  Pulmonary/Chest: Breath sounds normal. He has no wheezes. He has no rhonchi. He has no rales.   Clear and equal lung sounds.   Abdominal: Soft. There is no tenderness.   PEG tube in place.  Jejunostomy tube in place.   Genitourinary:   Genitourinary Comments: Zelaya catheter in place.  Rectal exam: Active large bloody clots noted from rectum.   Musculoskeletal:   Contracted lower extremities.   Neurological: He is alert.   Pt is awake.  Pt nonverbal secondary to the trach.   Skin: Skin is warm and dry.         ED Course   Critical Care  Date/Time: 2/14/2017 8:52 PM  Performed by: JHONY CURTIS  Authorized by: JHONY CURTIS   Direct patient critical care time: 14 minutes  Ordering / reviewing critical care time: 10 minutes  Documentation critical care time: 9 minutes  Consulting other physicians critical care time: 5 minutes  Total critical care time (exclusive of procedural time) : 38 minutes  Critical care was time spent personally by me on the following activities: development of treatment plan  with patient or surrogate, evaluation of patient's response to treatment, examination of patient, obtaining history from patient or surrogate, ordering and review of laboratory studies and re-evaluation of patient's condition.        Labs Reviewed   CBC W/ AUTO DIFFERENTIAL - Abnormal; Notable for the following:        Result Value    RBC 3.51 (*)     Hemoglobin 9.2 (*)     Hematocrit 28.6 (*)     MCH 26.1 (*)     RDW 18.0 (*)     MPV 8.0 (*)     All other components within normal limits   COMPREHENSIVE METABOLIC PANEL   PROTIME-INR   URINALYSIS   TYPE & SCREEN             Medical Decision Making:   Initial Assessment:   66-year-old male presented with a chief complaint of rectal bleeding.    Differential Diagnosis:   Differential diagnosis includes colitis, diverticulitis, coagulopathy, and rectal trauma.  Clinical Tests:   Lab Tests: Ordered and Reviewed  Medical Tests: Ordered and Reviewed  ED Management:  The patient was emergently evaluated in the ED, his evaluation was significant for an elderly male with active rectal bleeding noted on physical exam.  The patient has a large amount of bloody clots noted from his rectum on physical exam.  The patient's abdominal exam is also noted to be benign.  The patient's labs showed no acute processes, and show his hemoglobin and hematocrit to be at baseline.  The patient is noted to be on Lovenox for anticoagulation.  The patient did have a rectal tube pulled today prior to being discharged to his nursing home.  The patient will require admission for further evaluation and treatment of his rectal bleeding.  I discussed the case with the APC on-call for the hospitalist service.  She has accepted the patient for admission.  Other:   I have discussed this case with another health care provider.            Scribe Attestation:   Scribe #1: I performed the above scribed service and the documentation accurately describes the services I performed. I attest to the accuracy of the  note.    Attending Attestation:           Physician Attestation for Scribe:  Physician Attestation Statement for Scribe #1: I, Dr. Hardy, reviewed documentation, as scribed by Bobby Fisher in my presence, and it is both accurate and complete.                 ED Course     Clinical Impression:   The encounter diagnosis was Lower GI bleed.          Drew Hardy MD  02/14/17 9913

## 2017-02-15 NOTE — H&P
[facility] Hospital Medicine  History & Physical    Patient Name: Emilie Proctor Sr.  MRN: [id number]  Admission Date: 2/14/2017  Attending Physician: Socorro Chicas MD   Primary Care Provider: Marcial Avila MD         Patient information was obtained from ER records.     Subjective:     Principal Problem:Lower GI bleed    Chief Complaint:   Chief Complaint   Patient presents with    Rectal Bleeding     On lovenox and rectal tube pulled today. Bleeding getting worse throughout the day        HPI: Emilie Proctor is a 66 y.o. Male with PMHx significant for CVA, anoxic brain injury, s/p trach/peg on chronic ventilator therapy at [facility].  He was admitted to the service of hospital medicine with lower GI bleed.  He was recently treated here for PNA and discharged to  [facility] approximately 4 hours PTA.  Apparently, he was discharged with rectal tube in place.  The tube was removed at [facility] and subsequently the patient began to pass bright red blood per rectum with gelatinous clots noted at that time.  He continued with the same in ED.  He was receiving lovenox injections inpatient and on discharge.  On review of his records, it is noted that he underwent colonoscopy with Dr. White in May 2016 with findings significant for large, non-bleeding internal hemorrhoids.  Other pertinent medical history as below:    Past Medical History   Diagnosis Date    Anoxic brain injury 7/25/2015     during cardiac arrest    Chronic kidney disease     Decubitus ulcer of ankle, unstageable      outer right ankle    Decubitus ulcer of sacral region, stage 3     Decubitus ulcer, unstageable      left ear upper with blister to lower ear lobe    Diabetes mellitus      type II    Dissecting aneurysm of thoracoabdominal aorta 7/25/2015    Encounter for blood transfusion     H/O renal calculi     History of ATN      r/t hypoperfusion    Hyperlipidemia     Hypertension     Seizures     Severe anemia      Stroke 7/25/2015     bilateral watershed infarcts    Ventilator dependent        Past Surgical History   Procedure Laterality Date    Tracheostomy tube placement      Gastrostomy tube placement      Colonoscopy N/A 5/16/2016     Procedure: COLONOSCOPY;  Surgeon: Wei Moncada MD;  Location: [facility];  Service: Endoscopy;  Laterality: N/A;    Jejunostomy         Review of patient's allergies indicates:  No Known Allergies    Current Facility-Administered Medications on File Prior to Encounter   Medication    [DISCONTINUED] acetaminophen oral solution 650 mg    [DISCONTINUED] carvedilol tablet 12.5 mg    [DISCONTINUED] ceFEPIme in dextrose 5% 2 gram/50 mL IVPB 2 g    [DISCONTINUED] chlorhexidine 0.12 % solution 15 mL    [DISCONTINUED] enoxaparin injection 40 mg    [DISCONTINUED] ferrous sulfate 300 mg (60 mg iron)/5 mL syrup 300 mg    [DISCONTINUED] glycopyrrolate tablet 1 mg    [DISCONTINUED] Lactobacillus acidoph-L.bulgar 1 million cell tablet 1 tablet    [DISCONTINUED] levetiracetam oral soln Soln 500 mg    [DISCONTINUED] miconazole nitrate 2% ointment    [DISCONTINUED] pantoprazole injection 40 mg    [DISCONTINUED] polyethylene glycol packet 17 g    [DISCONTINUED] rosuvastatin tablet 5 mg    [DISCONTINUED] tigecycline (TYGACIL) 50 mg in dextrose 5 % 100 mL IVPB     Current Outpatient Prescriptions on File Prior to Encounter   Medication Sig    acetaminophen (TYLENOL) 650 MG TbSR 1 tablet (650 mg total) by Per J Tube route every 6 (six) hours as needed (fever).    acetylcysteine 200 mg/ml, 20%, (MUCOMYST) 200 mg/mL (20 %) nebulizer solution Take 4 mLs by nebulization every 6 (six) hours.     albuterol (ACCUNEB) 1.25 mg/3 mL Nebu Take 2.5 mg by nebulization every 6 (six) hours.    amlodipine (NORVASC) 10 MG tablet 1 tablet (10 mg total) by Per J Tube route once daily.    bisacodyl (DULCOLAX) 10 mg Supp Place 1 suppository (10 mg total) rectally daily as needed (daily as needed to  ensure at least one bowel movement each day).    carvedilol (COREG) 12.5 MG tablet 1 tablet (12.5 mg total) by Per J Tube route 2 (two) times daily.    ceFEPIme in dextrose 5% (MAXIPIME) 2 gram/50 mL PgBk Inject 50 mLs (2 g total) into the vein every 12 (twelve) hours.    chlorhexidine (PERIDEX) 0.12 % solution Use as directed 15 mLs in the mouth or throat 2 (two) times daily.    dextrose 5 % SolP 100 mL with tigecycline 50 mg SolR 50 mg Inject 50 mg into the vein every 12 (twelve) hours.    enoxaparin (LOVENOX) 40 mg/0.4 mL Syrg Inject 0.4 mLs (40 mg total) into the skin once daily.    famotidine (PEPCID) 40 mg/5 mL (8 mg/mL) suspension 2.5 mLs (20 mg total) by Per J Tube route 2 (two) times daily.    ferrous sulfate 300 mg (60 mg iron)/5 mL syrup 5 mLs (300 mg total) by Per J Tube route 2 (two) times daily. Give with vitamin c.    glycopyrrolate (ROBINUL) 1 mg Tab 1 tablet (1 mg total) by Per J Tube route 3 (three) times daily.    levetiracetam (KEPPRA) 100 mg/mL Soln 5 mLs (500 mg total) by Per J Tube route 2 (two) times daily.    LIDOCAINE HCL/PF (LIDOCAINE, PF,) 200 mg/20 mL (1 %) Syrg Take 2 mLs by nebulization every 6 (six) hours as needed (cough).    miconazole NITRATE 2 % (MICOTIN) 2 % top powder Apply topically to perineal area BID    miconazole nitrate 2% (MICOTIN) 2 % Oint Apply topically 2 (two) times daily. Apply to perineal areas    polyethylene glycol (GLYCOLAX) 17 gram PwPk 17 g by Per J Tube route once daily.    rosuvastatin (CRESTOR) 5 MG tablet 1 tablet (5 mg total) by Per J Tube route once daily.    Saccharomyces boulardii (FLORASTOR) 250 mg capsule 250 mg by Per G Tube route 2 (two) times daily.    selenium sulfide (SELSUN) 1 % Sham Three times weekly with bath.  Apply to hair.     Family History     None        Social History Main Topics    Smoking status: Never Smoker    Smokeless tobacco: Not on file    Alcohol use No    Drug use: No    Sexual activity: No     Review of  Systems   Unable to perform ROS: Patient nonverbal     Objective:     Vital Signs (Most Recent):  Temp: 99.3 °F (37.4 °C) (02/15/17 0145)  Pulse: 79 (02/15/17 0215)  Resp: (!) 21 (02/15/17 0200)  BP: 117/78 (02/15/17 0200)  SpO2: 100 % (02/15/17 0215) Vital Signs (24h Range):  Temp:  [97.6 °F (36.4 °C)-99.3 °F (37.4 °C)] 99.3 °F (37.4 °C)  Pulse:  [59-92] 79  Resp:  [0-73] 21  SpO2:  [92 %-100 %] 100 %  BP: ()/(65-83) 117/78     Weight: 66.7 kg (147 lb 0.8 oz)  Body mass index is 21.72 kg/(m^2).    Physical Exam   Constitutional: No distress.   Chronically-ill appearing man with trach on mechanical ventilation   HENT:   Head: Normocephalic and atraumatic.   Eyes: Conjunctivae and EOM are normal. Pupils are equal, round, and reactive to light. Right eye exhibits no discharge. Left eye exhibits no discharge.   Neck: Normal range of motion. Neck supple.   Trach present.   Cardiovascular: Normal rate, regular rhythm, normal heart sounds and intact distal pulses.    Pulmonary/Chest: Effort normal and breath sounds normal. No respiratory distress.   Appears comfortable on MV.  Mechanical breath sounds throughout.   Abdominal: Soft. Bowel sounds are normal. He exhibits no distension. There is no tenderness.   Jtube/PEG site with scant drainage, non-erythematous.    Genitourinary:   Genitourinary Comments: External hemorrhoids noted, no overt bleeding of external structures.   Musculoskeletal: He exhibits no edema.   Moves LEFT upper arm some, other extremities appear contracted.   Neurological: He is alert.   Tracks with eyes on command--delayed.  Does not follow other commands.   Skin: Skin is warm and dry. He is not diaphoretic.   Stage 2 sacral decubitus.         Significant Labs:   CBC:   Recent Labs  Lab 02/14/17  0322 02/14/17  1839 02/15/17  0010   WBC 7.10 7.00 7.20   HGB 9.0* 9.2* 9.0*   HCT 28.0* 28.6* 28.6*    327 390*     CMP:   Recent Labs  Lab 02/13/17  0420 02/14/17  0322 02/14/17  1839   NA  133* 133* 132*   K 3.7 3.9 3.8    103 105   CO2 24 24 21*   GLU 86 103 91   BUN 27* 26* 24*   CREATININE 0.7 0.7 0.6   CALCIUM 8.2* 8.0* 8.3*   PROT 7.7 7.7 7.9   ALBUMIN 1.9* 1.9* 2.0*   BILITOT 1.1* 0.6 1.0   ALKPHOS 119 124 128   AST 21 21 21   ALT 16 16 12   ANIONGAP 7* 6* 6*   EGFRNONAA >60 >60 >60         Assessment/Plan:     Lower GI bleed  Acute, H&H noted to be stable at this time.  Will hold feedings.  Hold Lovenox.  Consult gastroenterologist for evaluation.  Monitor closely, check H&H q 6.   Current CBC reviewed-   Lab Results   Component Value Date    WBC 7.20 02/15/2017    HGB 9.0 (L) 02/15/2017    HCT 28.6 (L) 02/15/2017    MCV 81 (L) 02/15/2017     (H) 02/15/2017     Monitor serial CBC and transfuse if patient becomes hemodynamically unstable, symptomatic or H/H drops below 7/21.         Chronic respiratory failure  Continue mechanical ventilation.      History of CVA (cerebrovascular accident)  Turn every two hours.      Tracheostomy in place  Trach care per respiratory therapy.       Pneumonia due to Escherichia coli  Continue Cefepime and Tygacil as recently prescribed for ongoing treatment of PNA.  Monitor closely for symptoms of treatment failure.  Appears to be improved from prior hospitalization.      Seizure disorder  Continue Levetiracetam.  Seizure precautions.      Anemia, chronic disease  H&H consistent with baseline despite acute rectal bleeding.  Monitor serial CBC and transfuse if patient becomes hemodynamically unstable, symptomatic or H/H drops below 7/21.         VTE Risk Mitigation         Ordered     Medium Risk of VTE  Once      02/14/17 2356     Place sequential compression device  Until discontinued      02/14/17 2356     Place TYLER hose  Until discontinued      02/14/17 2356     Reason for No Pharmacological VTE Prophylaxis  Once      02/14/17 2356        Sirena Bridges NP  Department of Hospital Medicine   [facility]

## 2017-02-15 NOTE — ASSESSMENT & PLAN NOTE
Continue Cefepime and Tygacil as recently prescribed for ongoing treatment of PNA.  Monitor closely for symptoms of treatment failure.  Appears to be improved from prior hospitalization.

## 2017-02-15 NOTE — CONSULTS
"[facility] Note    CC: hematochezia    HPI 66 y.o. male with a PMH significant for anoxic brain injury, vent dependent with recent admission for Pneumonia re-presented to the hospital on the same day of discharge due to moderate amount of hematochezia that began after removing his rectal tube at the nursing home.  The patient was passing gelatinous red material as well as blood clots from the rectum.  The rectal tube was placed during his admission for pneumonia for loose stools.  He was also on Lovenox therapy for DVT ppx at both the hospital and the NH.  Since his admission - he has had a smear of red/brown stool on his diaper.  No further holger bleeding.    Past Medical History   Diagnosis Date    Anoxic brain injury 7/25/2015     during cardiac arrest    Chronic kidney disease     Decubitus ulcer of ankle, unstageable      outer right ankle    Decubitus ulcer of sacral region, stage 3     Decubitus ulcer, unstageable      left ear upper with blister to lower ear lobe    Diabetes mellitus      type II    Dissecting aneurysm of thoracoabdominal aorta 7/25/2015    Encounter for blood transfusion     H/O renal calculi     History of ATN      r/t hypoperfusion    Hyperlipidemia     Hypertension     Seizures     Severe anemia     Stroke 7/25/2015     bilateral watershed infarcts    Ventilator dependent          Review of Systems  Unable to obtain due to mental status    Physical Examination  Visit Vitals    /74    Pulse 80    Temp 99 °F (37.2 °C) (Axillary)    Resp (!) 24    Ht 5' 9" (1.753 m)    Wt 66.7 kg (147 lb 0.8 oz)    SpO2 99%    BMI 21.72 kg/m2     General appearance: awake, supine in bed, no distress  HENT: Normocephalic, atraumatic, tracheostomy in place  Abdomen: soft, NT ND BS present    Labs:  H/H 8.6/27.2    Assessment:   65 yo male admit with rectal bleeding after removal of rectal tube - likely due to a rectal ulcer that his since stopped bleeding.  Given " multiple medical co-morbidities and inability to tolerate a colon prep - no further invasive investigation will be needed at this time.    Plan:  OK to resume Lovenox for DVT ppx  Continue medical supportive care  No plans for endoscopy  OK to discharge back to NH from a GI standpoint  Avoid rectal tubes in the future.    Rajesh Paiz MD  [facility]  [address], [address]  [place] [address]  Office: [phone]  Fax: [phone]

## 2017-02-15 NOTE — DISCHARGE INSTRUCTIONS
Admit to [facility]     Dx-   Patient Active Problem List    Diagnosis Date Noted    Lower GI bleed 02/14/2017    Pneumonia of both lower lobes due to infectious organism 02/04/2017    Sepsis 02/02/2017    Transaminitis 11/21/2016    Acute cystitis with hematuria 11/20/2016    Chronic respiratory failure 11/20/2016    Malfunctioning jejunostomy tube 11/04/2016    Aspiration into airway 10/18/2016    Abnormal CXR 10/18/2016    Acute on chronic respiratory failure with hypercapnia 10/17/2016    Gastrostomy status 10/17/2016    Protein-calorie malnutrition, moderate 10/17/2016    Seizure disorder 10/17/2016    History of CVA (cerebrovascular accident) 10/17/2016    Anoxic brain injury 10/17/2016    Pneumonia due to Escherichia coli 09/13/2016    Anemia, chronic disease 09/04/2016    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    Hemiplegia as late effect of stroke 12/06/2015    Tracheostomy in place 12/06/2015    Functional quadriplegia 12/06/2015    Jejunostomy tube in situ 12/06/2015    Aortic dissection distal to left subclavian 11/05/2015       Diet- NPO     TF- Diabetasource AC @65cc/hr with 60cc H2O flushes q4  Aspiration Precautions   Keep HOB @30 degrees      Vent Mode: A/C  Oxygen Concentration (%): [30] 30  Resp Rate Total: [22 br/min-26 br/min] 22 br/min  Vt Set: [500 mL] 500 mL  PEEP/CPAP: [5 cmH20] 5 cmH20  Pressure Support: [0 cmH20] 0 cmH20  Mean Airway Pressure: [10 enG82-97 cmH20] 11 cmH20     PICC line care and dressing changes per protocol      Turn q2 while in bed     Tygacil 50mg IV q12 and Cefepime 2gm IV q12 x13 days; stop date- 2/28/17; Dx- Pneumonia due to Escherichia coli

## 2017-02-15 NOTE — PROGRESS NOTES
I sent the GI consult and AVS to [facility] via fax to [phone]. Mariel Lopes LMSW

## 2017-02-15 NOTE — PLAN OF CARE
02/15/17 0007   Readmission Questionnaire   At the time of your discharge, did someone talk to you about what your health problems were? Yes   At the time of discharge, did someone talk to you about what to watch out for regarding worsening of your health problem? Yes   At the time of discharge, did someone talk to you about what to do if you experienced worsening of your health problem? Yes   At the time of discharge, did someone talk to you about which medication to take when you left the hospital and which ones to stop taking? Yes   At the time of discharge, did someone talk to you about when and where to follow up with a doctor after you left the hospital? Yes   How often do you need to have someone help you when you read instructions, pamphlets, or other written material from your doctor or pharmacy? Always   Do you have problems taking your medications as prescribed? No   Do you have any problems affording any of  your prescribed medications? No   Do you have problems obtaining/receiving your medications? No   Does the patient have transportation to healthcare appointments? Yes   Lives With facility resident   Living Arrangements residential facility   Does your caregiver provide all the help you need? Yes   In the last 7 days, my sleep quality was: good

## 2017-02-15 NOTE — DISCHARGE SUMMARY
Discharge Summary  Hospital Medicine    Admit Date: 2/14/2017    Date and Time: 2/15/852466:00 AM    Discharge Attending Physician: Socorro Chicas MD    Primary Care Physician: Marcial Avila MD    Diagnoses:  Active Hospital Problems    Diagnosis  POA    *Lower GI bleed [K92.2]  Yes    Chronic respiratory failure [J96.10]  Yes    Seizure disorder [G40.909]  Yes    History of CVA (cerebrovascular accident) [Z86.73]  Not Applicable    Pneumonia due to Escherichia coli [J15.5]  Yes    Anemia, chronic disease [D63.8]  Yes    Tracheostomy in place [Z93.0]  Not Applicable      Resolved Hospital Problems    Diagnosis Date Resolved POA   No resolved problems to display.     Discharged Condition: Good    Hospital Course:   Emilie Proctor is a 66 y.o. Male with PMHx significant for CVA, anoxic brain injury, s/p trach/peg on chronic ventilator therapy at [facility]. He was admitted to the service of hospital medicine with lower GI bleed. He was recently treated here for PNA and discharged to [facility] approximately 4 hours PTA.  Patient before discharge had rectal tube taken out by the nursing. No rectal bleeding noted at that time but apparent at NH rectal bleeding noted.  The tube was removed at [facility] and subsequently the patient began to pass bright red blood per rectum with gelatinous clots noted at that time. He continued with the same in ED. He was receiving lovenox injections inpatient and on discharge. On review of his records, it is noted that he underwent colonoscopy with Dr. White in May 2016 with findings significant for large, non-bleeding internal hemorrhoids. Patient was admitted to Hospitalist medicine service. Patient was evaluated by  Dr. Paiz. No further rectal bleeding noted. H/H remained stable. Avoidance of rectal tube recommended by Dr. Paiz. Patient is being discharged to nursing home in stable condition with following discharge plan of care. Total time with the patient was 30 minutes and  greater than 50% was spent in counseling and coordination of care. The assessment and plan have been discussed at length. Physicians' notes reviewed. Labs and procedure reviewed.     Consults: Dr. Paiz    Significant Diagnostic Studies:     Microbiology Results (last 7 days)     ** No results found for the last 168 hours. **        Special Treatments/Procedures: None  Disposition: [facility]    Medications:  Reconciled Home Medications: Current Discharge Medication List      CONTINUE these medications which have CHANGED    Details   ceFEPIme in dextrose 5% (MAXIPIME) 2 gram/50 mL PgBk Inject 50 mLs (2 g total) into the vein every 12 (twelve) hours.  Qty: 1000 mL, Refills: 0      dextrose 5 % SolP 100 mL with tigecycline 50 mg SolR 50 mg Inject 50 mg into the vein every 12 (twelve) hours.         CONTINUE these medications which have NOT CHANGED    Details   acetaminophen (TYLENOL) 650 MG TbSR 1 tablet (650 mg total) by Per J Tube route every 6 (six) hours as needed (fever).  Refills: 0      acetylcysteine 200 mg/ml, 20%, (MUCOMYST) 200 mg/mL (20 %) nebulizer solution Take 4 mLs by nebulization every 6 (six) hours.       albuterol (ACCUNEB) 1.25 mg/3 mL Nebu Take 2.5 mg by nebulization every 6 (six) hours.      amlodipine (NORVASC) 10 MG tablet 1 tablet (10 mg total) by Per J Tube route once daily.  Refills: 11      bisacodyl (DULCOLAX) 10 mg Supp Place 1 suppository (10 mg total) rectally daily as needed (daily as needed to ensure at least one bowel movement each day).  Refills: 0      carvedilol (COREG) 12.5 MG tablet 1 tablet (12.5 mg total) by Per J Tube route 2 (two) times daily.  Qty: 60 tablet, Refills: 11      chlorhexidine (PERIDEX) 0.12 % solution Use as directed 15 mLs in the mouth or throat 2 (two) times daily.  Refills: 0      enoxaparin (LOVENOX) 40 mg/0.4 mL Syrg Inject 0.4 mLs (40 mg total) into the skin once daily.      famotidine (PEPCID) 40 mg/5 mL (8 mg/mL) suspension 2.5 mLs (20 mg total) by Per  J Tube route 2 (two) times daily.  Refills: 0      ferrous sulfate 300 mg (60 mg iron)/5 mL syrup 5 mLs (300 mg total) by Per J Tube route 2 (two) times daily. Give with vitamin c.  Refills: 0      glycopyrrolate (ROBINUL) 1 mg Tab 1 tablet (1 mg total) by Per J Tube route 3 (three) times daily.      levetiracetam (KEPPRA) 100 mg/mL Soln 5 mLs (500 mg total) by Per J Tube route 2 (two) times daily.      LIDOCAINE HCL/PF (LIDOCAINE, PF,) 200 mg/20 mL (1 %) Syrg Take 2 mLs by nebulization every 6 (six) hours as needed (cough).      miconazole NITRATE 2 % (MICOTIN) 2 % top powder Apply topically to perineal area BID  Refills: 0      miconazole nitrate 2% (MICOTIN) 2 % Oint Apply topically 2 (two) times daily. Apply to perineal areas  Refills: 0      polyethylene glycol (GLYCOLAX) 17 gram PwPk 17 g by Per J Tube route once daily.  Refills: 0      rosuvastatin (CRESTOR) 5 MG tablet 1 tablet (5 mg total) by Per J Tube route once daily.      Saccharomyces boulardii (FLORASTOR) 250 mg capsule 250 mg by Per G Tube route 2 (two) times daily.      selenium sulfide (SELSUN) 1 % Sham Three times weekly with bath.  Apply to hair.             Discharge Procedure Orders  Diet general   Order Comments: J-tube feeding: Diabetasource AC at 65 /hr with 60 cc free water flushes q 4 hrs     Other restrictions (specify):   Order Comments: Fall precautions   Scheduling Instructions: Rotate patient q 2 hrs     Call MD for:   Order Comments: For worsening symptoms, chest pain, shortness of breath, increased abdominal pain, high grade fever, stroke or stroke like symptoms, immediately go to the nearest Emergency Room or call 911 as soon as possible.       Follow-up Information     Follow up with Marcial Avila MD.    Specialty:  Family Medicine    Contact information:    [place] [id number] [phone]

## 2017-02-15 NOTE — UM SECONDARY REVIEW
Physician Advisor External    Level of Care Issue    Approved Inpatient for admit 2/14/2017 per Dr. Gilliland at [facility]

## 2017-02-15 NOTE — PROGRESS NOTES
Per Brittney at [facility], she thought that report was called in already, however no one has called. She stated to call report to [facility] at [phone]. Mariel Lopes LMSW

## 2017-02-15 NOTE — PROGRESS NOTES
Frequent checks made for possible bm with bloody secretions on pads from rectum.  Upon assessment, some bright red residue noted.  Vitals remain stable.  Continuing to monitor.

## 2017-02-15 NOTE — ED NOTES
Presents to the ER with c/o rectal bleeding that started after rectal tube was removed today. Patient has large blood clots coming from rectum. Patient does take Lovenox daily. Upper and lower extremities are contracted. Patient has a trach, g tube and jejunostomy tube in place. Mucous membranes are pink and moist. Skin is warm, dry and intact. Lungs are clear bilaterally, respirations are regular and unlabored. Denies cough, congestion, rhinorrhea or SOB. BS active x4, no tenderness with palpation, abd is soft and not distended. Denies any appetite or activity change. S1S2, capillary refill is < 2 seconds. Denies dysuria, difficulty urinating, frequency, numbness, tingling or weakness. CARLOS ALBERTO FRANKLINS

## 2017-02-15 NOTE — PLAN OF CARE
Problem: Patient Care Overview  Goal: Individualization & Mutuality  Outcome: Ongoing (interventions implemented as appropriate)  0005 received pt to icu from er,eyes open,vent pt from [facility],vs stable,no obvious distress noted,Don RN assumed care of pt upon admission to icu

## 2017-02-15 NOTE — CONSULTS
Re tube feeding recommendation:     1) Continue with Diabetasource AC @ goal rate 65 mls/hr continuous providing 1872 kcals/day, 94 g protein/day, 156 g CHO/day, and 1276 mls water/day. Needs assessed per [place]: 1803 kcals/day. Hold TF x4 hrs for residuals >250mls. Flush 90 mls Q4 hrs to meet fluid needs or per MD.     Pt has tolerated this TF at goal rate with 0 residuals.    Anthropometrics  Height (inches): 69.02 in  Weight Method: Bed Scale  Weight (kg): 70.3 kg  Ideal Body Weight (IBW), Male: 160.12 lb  % Ideal Body Weight, Male (lb): 96.79 lb  BMI (kg/m2): 22.88  BMI Grade: 18.5-24.9 - normal          Estimated/Assessed Needs  Weight Used For Calorie Calculations: 70.3 kg (154 lb 15.7 oz)   Height (cm): 175.3 cm  Energy Need Method: [place] (1803)  RMR (Sargent-St. Jeor Equation): 1483.12  Weight Used For Protein Calculations: 70.3 kg (154 lb 15.7 oz)  1.2 gm Protein (gm): 84.54 and 2.0 gm Protein (gm): 140.89  Fluid Need Method: RDA Method (or per MD)   Grams of CHO per day: 225 g max

## 2017-02-15 NOTE — ASSESSMENT & PLAN NOTE
Acute, H&H noted to be stable at this time.  Will hold feedings.  Hold Lovenox.  Consult gastroenterologist for evaluation.  Monitor closely, check H&H q 6.   Current CBC reviewed-   Lab Results   Component Value Date    WBC 7.20 02/15/2017    HGB 9.0 (L) 02/15/2017    HCT 28.6 (L) 02/15/2017    MCV 81 (L) 02/15/2017     (H) 02/15/2017     Monitor serial CBC and transfuse if patient becomes hemodynamically unstable, symptomatic or H/H drops below 7/21.

## 2017-02-15 NOTE — PLAN OF CARE
Problem: Patient Care Overview  Goal: Plan of Care Review  Outcome: Ongoing (interventions implemented as appropriate)  Patient remains on  on ac rate 18. vt 450 , Peep +5 anf fi02 .4.  Hr 78, ETC02 16mmhg and 02 saturation 100%.  Patient trached via 7 extra long shiley with 81elt58 cuff psi.  patient  receiving aerosol tx via duoneb and 4ml 205 mucomyst now and q6hr.  Ambu bag at bedside with alarms on and functioning properly at this time.

## 2017-02-15 NOTE — PLAN OF CARE
02/15/17 1606   Final Note   Assessment Type Discharge Planning Assessment   Discharge Disposition Nurse   Discharge planning education complete? Yes

## 2017-02-15 NOTE — SUBJECTIVE & OBJECTIVE
Past Medical History   Diagnosis Date    Anoxic brain injury 7/25/2015     during cardiac arrest    Chronic kidney disease     Decubitus ulcer of ankle, unstageable      outer right ankle    Decubitus ulcer of sacral region, stage 3     Decubitus ulcer, unstageable      left ear upper with blister to lower ear lobe    Diabetes mellitus      type II    Dissecting aneurysm of thoracoabdominal aorta 7/25/2015    Encounter for blood transfusion     H/O renal calculi     History of ATN      r/t hypoperfusion    Hyperlipidemia     Hypertension     Seizures     Severe anemia     Stroke 7/25/2015     bilateral watershed infarcts    Ventilator dependent        Past Surgical History   Procedure Laterality Date    Tracheostomy tube placement      Gastrostomy tube placement      Colonoscopy N/A 5/16/2016     Procedure: COLONOSCOPY;  Surgeon: Wei Moncada MD;  Location[facility];  Service: Endoscopy;  Laterality: N/A;    Jejunostomy         Review of patient's allergies indicates:  No Known Allergies    Current Facility-Administered Medications on File Prior to Encounter   Medication    [DISCONTINUED] acetaminophen oral solution 650 mg    [DISCONTINUED] carvedilol tablet 12.5 mg    [DISCONTINUED] ceFEPIme in dextrose 5% 2 gram/50 mL IVPB 2 g    [DISCONTINUED] chlorhexidine 0.12 % solution 15 mL    [DISCONTINUED] enoxaparin injection 40 mg    [DISCONTINUED] ferrous sulfate 300 mg (60 mg iron)/5 mL syrup 300 mg    [DISCONTINUED] glycopyrrolate tablet 1 mg    [DISCONTINUED] Lactobacillus acidoph-L.bulgar 1 million cell tablet 1 tablet    [DISCONTINUED] levetiracetam oral soln Soln 500 mg    [DISCONTINUED] miconazole nitrate 2% ointment    [DISCONTINUED] pantoprazole injection 40 mg    [DISCONTINUED] polyethylene glycol packet 17 g    [DISCONTINUED] rosuvastatin tablet 5 mg    [DISCONTINUED] tigecycline (TYGACIL) 50 mg in dextrose 5 % 100 mL IVPB     Current Outpatient Prescriptions on File  Prior to Encounter   Medication Sig    acetaminophen (TYLENOL) 650 MG TbSR 1 tablet (650 mg total) by Per J Tube route every 6 (six) hours as needed (fever).    acetylcysteine 200 mg/ml, 20%, (MUCOMYST) 200 mg/mL (20 %) nebulizer solution Take 4 mLs by nebulization every 6 (six) hours.     albuterol (ACCUNEB) 1.25 mg/3 mL Nebu Take 2.5 mg by nebulization every 6 (six) hours.    amlodipine (NORVASC) 10 MG tablet 1 tablet (10 mg total) by Per J Tube route once daily.    bisacodyl (DULCOLAX) 10 mg Supp Place 1 suppository (10 mg total) rectally daily as needed (daily as needed to ensure at least one bowel movement each day).    carvedilol (COREG) 12.5 MG tablet 1 tablet (12.5 mg total) by Per J Tube route 2 (two) times daily.    ceFEPIme in dextrose 5% (MAXIPIME) 2 gram/50 mL PgBk Inject 50 mLs (2 g total) into the vein every 12 (twelve) hours.    chlorhexidine (PERIDEX) 0.12 % solution Use as directed 15 mLs in the mouth or throat 2 (two) times daily.    dextrose 5 % SolP 100 mL with tigecycline 50 mg SolR 50 mg Inject 50 mg into the vein every 12 (twelve) hours.    enoxaparin (LOVENOX) 40 mg/0.4 mL Syrg Inject 0.4 mLs (40 mg total) into the skin once daily.    famotidine (PEPCID) 40 mg/5 mL (8 mg/mL) suspension 2.5 mLs (20 mg total) by Per J Tube route 2 (two) times daily.    ferrous sulfate 300 mg (60 mg iron)/5 mL syrup 5 mLs (300 mg total) by Per J Tube route 2 (two) times daily. Give with vitamin c.    glycopyrrolate (ROBINUL) 1 mg Tab 1 tablet (1 mg total) by Per J Tube route 3 (three) times daily.    levetiracetam (KEPPRA) 100 mg/mL Soln 5 mLs (500 mg total) by Per J Tube route 2 (two) times daily.    LIDOCAINE HCL/PF (LIDOCAINE, PF,) 200 mg/20 mL (1 %) Syrg Take 2 mLs by nebulization every 6 (six) hours as needed (cough).    miconazole NITRATE 2 % (MICOTIN) 2 % top powder Apply topically to perineal area BID    miconazole nitrate 2% (MICOTIN) 2 % Oint Apply topically 2 (two) times daily. Apply  to perineal areas    polyethylene glycol (GLYCOLAX) 17 gram PwPk 17 g by Per J Tube route once daily.    rosuvastatin (CRESTOR) 5 MG tablet 1 tablet (5 mg total) by Per J Tube route once daily.    Saccharomyces boulardii (FLORASTOR) 250 mg capsule 250 mg by Per G Tube route 2 (two) times daily.    selenium sulfide (SELSUN) 1 % Sham Three times weekly with bath.  Apply to hair.     Family History     None        Social History Main Topics    Smoking status: Never Smoker    Smokeless tobacco: Not on file    Alcohol use No    Drug use: No    Sexual activity: No     Review of Systems   Unable to perform ROS: Patient nonverbal     Objective:     Vital Signs (Most Recent):  Temp: 99.3 °F (37.4 °C) (02/15/17 0145)  Pulse: 79 (02/15/17 0215)  Resp: (!) 21 (02/15/17 0200)  BP: 117/78 (02/15/17 0200)  SpO2: 100 % (02/15/17 0215) Vital Signs (24h Range):  Temp:  [97.6 °F (36.4 °C)-99.3 °F (37.4 °C)] 99.3 °F (37.4 °C)  Pulse:  [59-92] 79  Resp:  [0-73] 21  SpO2:  [92 %-100 %] 100 %  BP: ()/(65-83) 117/78     Weight: 66.7 kg (147 lb 0.8 oz)  Body mass index is 21.72 kg/(m^2).    Physical Exam   Constitutional: No distress.   Chronically-ill appearing man with trach on mechanical ventilation   HENT:   Head: Normocephalic and atraumatic.   Eyes: Conjunctivae and EOM are normal. Pupils are equal, round, and reactive to light. Right eye exhibits no discharge. Left eye exhibits no discharge.   Neck: Normal range of motion. Neck supple.   Trach present.   Cardiovascular: Normal rate, regular rhythm, normal heart sounds and intact distal pulses.    Pulmonary/Chest: Effort normal and breath sounds normal. No respiratory distress.   Appears comfortable on MV.  Mechanical breath sounds throughout.   Abdominal: Soft. Bowel sounds are normal. He exhibits no distension. There is no tenderness.   Jtube/PEG site with scant drainage, non-erythematous.    Genitourinary:   Genitourinary Comments: External hemorrhoids noted, no  overt bleeding of external structures.   Musculoskeletal: He exhibits no edema.   Moves LEFT upper arm some, other extremities appear contracted.   Neurological: He is alert.   Tracks with eyes on command--delayed.  Does not follow other commands.   Skin: Skin is warm and dry. He is not diaphoretic.   Stage 2 sacral decubitus.         Significant Labs:   CBC:   Recent Labs  Lab 02/14/17  0322 02/14/17  1839 02/15/17  0010   WBC 7.10 7.00 7.20   HGB 9.0* 9.2* 9.0*   HCT 28.0* 28.6* 28.6*    327 390*     CMP:   Recent Labs  Lab 02/13/17  0420 02/14/17  0322 02/14/17  1839   * 133* 132*   K 3.7 3.9 3.8    103 105   CO2 24 24 21*   GLU 86 103 91   BUN 27* 26* 24*   CREATININE 0.7 0.7 0.6   CALCIUM 8.2* 8.0* 8.3*   PROT 7.7 7.7 7.9   ALBUMIN 1.9* 1.9* 2.0*   BILITOT 1.1* 0.6 1.0   ALKPHOS 119 124 128   AST 21 21 21   ALT 16 16 12   ANIONGAP 7* 6* 6*   EGFRNONAA >60 >60 >60

## 2017-02-15 NOTE — PROGRESS NOTES
The pt is snf care at [facility]. The pt is non verbal and we have had a hard time contacting any family, which appears to be the norm according to [facility] staff. The pt discharged yesterday and returned for rectal bleeding a few hours later. He will discharge to [facility] upon recovery. Mariel Lopes LMSW

## 2017-02-16 ENCOUNTER — HOSPITAL ENCOUNTER (EMERGENCY)
Facility: HOSPITAL | Age: 67
Discharge: NURSING, REHAB OR LONG-TERM CARE | End: 2017-02-16
Attending: EMERGENCY MEDICINE
Payer: MEDICARE

## 2017-02-16 VITALS
SYSTOLIC BLOOD PRESSURE: 129 MMHG | HEIGHT: 69 IN | TEMPERATURE: 98 F | BODY MASS INDEX: 22.22 KG/M2 | HEART RATE: 84 BPM | RESPIRATION RATE: 16 BRPM | OXYGEN SATURATION: 100 % | DIASTOLIC BLOOD PRESSURE: 82 MMHG | WEIGHT: 150 LBS

## 2017-02-16 VITALS
RESPIRATION RATE: 18 BRPM | DIASTOLIC BLOOD PRESSURE: 76 MMHG | BODY MASS INDEX: 22.22 KG/M2 | HEIGHT: 69 IN | SYSTOLIC BLOOD PRESSURE: 117 MMHG | OXYGEN SATURATION: 100 % | HEART RATE: 76 BPM | WEIGHT: 150 LBS

## 2017-02-16 DIAGNOSIS — E86.0 DEHYDRATION: Primary | ICD-10-CM

## 2017-02-16 DIAGNOSIS — R09.02 HYPOXIA: ICD-10-CM

## 2017-02-16 DIAGNOSIS — N39.0 URINARY TRACT INFECTION WITHOUT HEMATURIA, SITE UNSPECIFIED: Primary | ICD-10-CM

## 2017-02-16 DIAGNOSIS — R41.0 CONFUSION: ICD-10-CM

## 2017-02-16 LAB
ALBUMIN SERPL BCP-MCNC: 2.1 G/DL
ALBUMIN SERPL BCP-MCNC: 2.2 G/DL
ALP SERPL-CCNC: 123 U/L
ALP SERPL-CCNC: 125 U/L
ALT SERPL W/O P-5'-P-CCNC: 15 U/L
ALT SERPL W/O P-5'-P-CCNC: 15 U/L
ANION GAP SERPL CALC-SCNC: 8 MMOL/L
ANION GAP SERPL CALC-SCNC: 9 MMOL/L
AST SERPL-CCNC: 23 U/L
AST SERPL-CCNC: 27 U/L
BACTERIA #/AREA URNS HPF: ABNORMAL /HPF
BACTERIA #/AREA URNS HPF: ABNORMAL /HPF
BASOPHILS # BLD AUTO: 0 K/UL
BASOPHILS # BLD AUTO: 0 K/UL
BASOPHILS NFR BLD: 0.1 %
BASOPHILS NFR BLD: 0.3 %
BILIRUB SERPL-MCNC: 0.5 MG/DL
BILIRUB SERPL-MCNC: 0.6 MG/DL
BILIRUB UR QL STRIP: ABNORMAL
BILIRUB UR QL STRIP: NEGATIVE
BUN SERPL-MCNC: 34 MG/DL
BUN SERPL-MCNC: 40 MG/DL
CALCIUM SERPL-MCNC: 8.9 MG/DL
CALCIUM SERPL-MCNC: 9.1 MG/DL
CHLORIDE SERPL-SCNC: 109 MMOL/L
CHLORIDE SERPL-SCNC: 109 MMOL/L
CLARITY UR: ABNORMAL
CLARITY UR: ABNORMAL
CO2 SERPL-SCNC: 19 MMOL/L
CO2 SERPL-SCNC: 22 MMOL/L
COLOR UR: ABNORMAL
COLOR UR: YELLOW
CREAT SERPL-MCNC: 0.8 MG/DL
CREAT SERPL-MCNC: 0.8 MG/DL
DIFFERENTIAL METHOD: ABNORMAL
DIFFERENTIAL METHOD: ABNORMAL
EOSINOPHIL # BLD AUTO: 0 K/UL
EOSINOPHIL # BLD AUTO: 0 K/UL
EOSINOPHIL NFR BLD: 0 %
EOSINOPHIL NFR BLD: 0.2 %
ERYTHROCYTE [DISTWIDTH] IN BLOOD BY AUTOMATED COUNT: 18.4 %
ERYTHROCYTE [DISTWIDTH] IN BLOOD BY AUTOMATED COUNT: 18.4 %
EST. GFR  (AFRICAN AMERICAN): >60 ML/MIN/1.73 M^2
EST. GFR  (AFRICAN AMERICAN): >60 ML/MIN/1.73 M^2
EST. GFR  (NON AFRICAN AMERICAN): >60 ML/MIN/1.73 M^2
EST. GFR  (NON AFRICAN AMERICAN): >60 ML/MIN/1.73 M^2
FLUAV AG SPEC QL IA: NEGATIVE
FLUBV AG SPEC QL IA: NEGATIVE
GLUCOSE SERPL-MCNC: 131 MG/DL
GLUCOSE SERPL-MCNC: 146 MG/DL
GLUCOSE UR QL STRIP: NEGATIVE
GLUCOSE UR QL STRIP: NEGATIVE
GRAN CASTS #/AREA URNS LPF: 1 /LPF
HCT VFR BLD AUTO: 31.5 %
HCT VFR BLD AUTO: 33.1 %
HGB BLD-MCNC: 10 G/DL
HGB BLD-MCNC: 10.3 G/DL
HGB UR QL STRIP: ABNORMAL
HGB UR QL STRIP: ABNORMAL
HYALINE CASTS #/AREA URNS LPF: 0 /LPF
HYALINE CASTS #/AREA URNS LPF: 2 /LPF
INR PPP: 1.3
KETONES UR QL STRIP: ABNORMAL
KETONES UR QL STRIP: ABNORMAL
LACTATE SERPL-SCNC: 1.6 MMOL/L
LEUKOCYTE ESTERASE UR QL STRIP: ABNORMAL
LEUKOCYTE ESTERASE UR QL STRIP: NEGATIVE
LIPASE SERPL-CCNC: 286 U/L
LYMPHOCYTES # BLD AUTO: 1.9 K/UL
LYMPHOCYTES # BLD AUTO: 3 K/UL
LYMPHOCYTES NFR BLD: 13 %
LYMPHOCYTES NFR BLD: 26.1 %
MAGNESIUM SERPL-MCNC: 1.8 MG/DL
MCH RBC QN AUTO: 25.5 PG
MCH RBC QN AUTO: 26.4 PG
MCHC RBC AUTO-ENTMCNC: 31.3 %
MCHC RBC AUTO-ENTMCNC: 31.8 %
MCV RBC AUTO: 82 FL
MCV RBC AUTO: 83 FL
MICROSCOPIC COMMENT: ABNORMAL
MICROSCOPIC COMMENT: ABNORMAL
MONOCYTES # BLD AUTO: 0.6 K/UL
MONOCYTES # BLD AUTO: 0.7 K/UL
MONOCYTES NFR BLD: 4.2 %
MONOCYTES NFR BLD: 5.7 %
NEUTROPHILS # BLD AUTO: 12.4 K/UL
NEUTROPHILS # BLD AUTO: 7.9 K/UL
NEUTROPHILS NFR BLD: 67.7 %
NEUTROPHILS NFR BLD: 82.7 %
NITRITE UR QL STRIP: NEGATIVE
NITRITE UR QL STRIP: POSITIVE
PH UR STRIP: 6 [PH] (ref 5–8)
PH UR STRIP: 6 [PH] (ref 5–8)
PLATELET # BLD AUTO: 272 K/UL
PLATELET # BLD AUTO: 319 K/UL
PMV BLD AUTO: 7.7 FL
PMV BLD AUTO: 7.8 FL
POTASSIUM SERPL-SCNC: 4.3 MMOL/L
POTASSIUM SERPL-SCNC: 4.6 MMOL/L
PROT SERPL-MCNC: 8.5 G/DL
PROT SERPL-MCNC: 8.8 G/DL
PROT UR QL STRIP: ABNORMAL
PROT UR QL STRIP: ABNORMAL
PROTHROMBIN TIME: 13 SEC
RBC # BLD AUTO: 3.8 M/UL
RBC # BLD AUTO: 4.05 M/UL
RBC #/AREA URNS HPF: 50 /HPF (ref 0–4)
RBC #/AREA URNS HPF: >100 /HPF (ref 0–4)
SODIUM SERPL-SCNC: 137 MMOL/L
SODIUM SERPL-SCNC: 139 MMOL/L
SP GR UR STRIP: >=1.03 (ref 1–1.03)
SP GR UR STRIP: >=1.03 (ref 1–1.03)
SPECIMEN SOURCE: NORMAL
TROPONIN I SERPL DL<=0.01 NG/ML-MCNC: 0.02 NG/ML
URN SPEC COLLECT METH UR: ABNORMAL
URN SPEC COLLECT METH UR: ABNORMAL
UROBILINOGEN UR STRIP-ACNC: NEGATIVE EU/DL
UROBILINOGEN UR STRIP-ACNC: NEGATIVE EU/DL
WBC # BLD AUTO: 11.6 K/UL
WBC # BLD AUTO: 15 K/UL
WBC #/AREA URNS HPF: 5 /HPF (ref 0–5)
WBC #/AREA URNS HPF: 8 /HPF (ref 0–5)
YEAST URNS QL MICRO: ABNORMAL

## 2017-02-16 PROCEDURE — 25000003 PHARM REV CODE 250: Performed by: EMERGENCY MEDICINE

## 2017-02-16 PROCEDURE — 63600175 PHARM REV CODE 636 W HCPCS: Performed by: EMERGENCY MEDICINE

## 2017-02-16 PROCEDURE — 85025 COMPLETE CBC W/AUTO DIFF WBC: CPT

## 2017-02-16 PROCEDURE — 99285 EMERGENCY DEPT VISIT HI MDM: CPT | Mod: 25,27

## 2017-02-16 PROCEDURE — 99285 EMERGENCY DEPT VISIT HI MDM: CPT | Mod: 25

## 2017-02-16 PROCEDURE — 94002 VENT MGMT INPAT INIT DAY: CPT

## 2017-02-16 PROCEDURE — 96361 HYDRATE IV INFUSION ADD-ON: CPT

## 2017-02-16 PROCEDURE — 83735 ASSAY OF MAGNESIUM: CPT

## 2017-02-16 PROCEDURE — 94761 N-INVAS EAR/PLS OXIMETRY MLT: CPT

## 2017-02-16 PROCEDURE — 81000 URINALYSIS NONAUTO W/SCOPE: CPT

## 2017-02-16 PROCEDURE — 85610 PROTHROMBIN TIME: CPT

## 2017-02-16 PROCEDURE — 80053 COMPREHEN METABOLIC PANEL: CPT

## 2017-02-16 PROCEDURE — 87400 INFLUENZA A/B EACH AG IA: CPT | Mod: 59

## 2017-02-16 PROCEDURE — 84484 ASSAY OF TROPONIN QUANT: CPT

## 2017-02-16 PROCEDURE — 93005 ELECTROCARDIOGRAM TRACING: CPT

## 2017-02-16 PROCEDURE — 80053 COMPREHEN METABOLIC PANEL: CPT | Mod: 91

## 2017-02-16 PROCEDURE — 27000221 HC OXYGEN, UP TO 24 HOURS

## 2017-02-16 PROCEDURE — 96360 HYDRATION IV INFUSION INIT: CPT

## 2017-02-16 PROCEDURE — P9612 CATHETERIZE FOR URINE SPEC: HCPCS

## 2017-02-16 PROCEDURE — 85025 COMPLETE CBC W/AUTO DIFF WBC: CPT | Mod: 91

## 2017-02-16 PROCEDURE — 87040 BLOOD CULTURE FOR BACTERIA: CPT

## 2017-02-16 PROCEDURE — 96365 THER/PROPH/DIAG IV INF INIT: CPT

## 2017-02-16 PROCEDURE — 36415 COLL VENOUS BLD VENIPUNCTURE: CPT

## 2017-02-16 PROCEDURE — 93010 ELECTROCARDIOGRAM REPORT: CPT | Mod: ,,, | Performed by: INTERNAL MEDICINE

## 2017-02-16 PROCEDURE — 96366 THER/PROPH/DIAG IV INF ADDON: CPT

## 2017-02-16 PROCEDURE — 87086 URINE CULTURE/COLONY COUNT: CPT

## 2017-02-16 PROCEDURE — 81000 URINALYSIS NONAUTO W/SCOPE: CPT | Mod: 91

## 2017-02-16 PROCEDURE — 83605 ASSAY OF LACTIC ACID: CPT

## 2017-02-16 PROCEDURE — 83690 ASSAY OF LIPASE: CPT

## 2017-02-16 RX ORDER — SODIUM CHLORIDE 9 MG/ML
1000 INJECTION, SOLUTION INTRAVENOUS
Status: COMPLETED | OUTPATIENT
Start: 2017-02-16 | End: 2017-02-16

## 2017-02-16 RX ADMIN — CEFTRIAXONE 1 G: 1 INJECTION, SOLUTION INTRAVENOUS at 09:02

## 2017-02-16 RX ADMIN — SODIUM CHLORIDE 1000 ML: 0.9 INJECTION, SOLUTION INTRAVENOUS at 08:02

## 2017-02-16 NOTE — PROVIDER PROGRESS NOTES - EMERGENCY DEPT.
Encounter Date: 2/16/2017    ED Physician Progress Notes        Pt was received in the department with complaints respiratory distress while mech ventilated, reports of worsening recent abdominal distension requiring more frequent decompression through his G tube, and decreased alertness from baseline. PE examination reveals no acute changes from exam on last admission. Screening labs and imaging obtained to rule out central, infectious, metabolic contributing causes. At the time of sign out to Dr. Tadeo, CT was noted to be negative for acute changes. Further infectious studies pending. Patient warrants ongoing monitoring in the department.    NOY Amato MD

## 2017-02-16 NOTE — PHYSICIAN QUERY
PT Name: Emilie Proctor Sr.  MR #: [id number]  Physician Query Form - Cause and Effect Relationship Clarification    Reviewer  Ext  Sulaiman  [phone]     This form is a permanent document in the medical record.     Query Date: February 16, 2017  By submitting this query, we are merely seeking further clarification of documentation. Please utilize your independent clinical judgment when addressing the question(s) below.      Supporting Clinical Findings     Location in record   Sepsis with HCAP    Sepsis with HCAP, HCAP (Serratia Marcens, E. Coli-ESBL                                                                                                                                                                                     H & P    2/8 - 2/13 PN                                                                                                                                                                                                   Provider, please clarify if there is any correlation between Sepsis and E.Coli/Serratia.     Are the conditions:        [ x ] Due to or associated with each other      [  ] Unrelated to each other      [  ] Other (Please Specify): _________________________      [  ] Clinically Undetermined

## 2017-02-16 NOTE — ED NOTES
Pt sent from [facility] for respiratory distress; diaphoretic on arrival but no respiratory distress noted.

## 2017-02-16 NOTE — PROVIDER PROGRESS NOTES - EMERGENCY DEPT.
Encounter Date: 2/16/2017    ED Physician Progress Notes        Physician Note:   Patient signed over from Dr. Amato.  Patient presents from [facility] at 5 AM for respiratory distress with a distended abdomen I did speak to Elvira is a nurse at [facility] who states that the patient was in distress he was diaphoretic and cup he breathing.  She states that his abdomen was distended.  According to Dr. Amato the patient has not exhibited any respiratory distress during his time in the emergency department.  Head CT is unremarkable.  His EKG is normal sinus with no ST elevation or depression no T-wave inversions.  I did add on a troponin given the nurse's description of diaphoresis earlier this morning.  Abdomen is nondistended at this time.  X-rays are pending.  Labs are pending.  Disposition depends on lab results and clinical appearance.    Case discussed with Dr. Chicas.  X-rays do not reveal any acute findings.  Labs are unremarkable.  Urinalysis demonstrates infection but according to Dr. Chicas the patient is on IV meropenem at [place].  Patient will be discharged back to [facility].  No acute findings at this time.  No indication for hospital admission or any further studies.

## 2017-02-16 NOTE — ED AVS SNAPSHOT
[facility] [address] [place] [address]               Emilie Proctor Sr.   2017  5:46 AM   ED    Description:  Male : 1950   Department:  [facility]           Your Care was Coordinated By:     Provider Role From To    Cristian Amato MD Attending Provider 17 0549 17 0801    Vargas Tadeo MD Attending Provider 17 0801 --    Vargas Tadeo MD ED Temporary Attending 17 0742 --      Reason for Visit     Respiratory Distress           Diagnoses this Visit        Comments    Urinary tract infection without hematuria, site unspecified    -  Primary       ED Disposition     None           To Do List           Follow-up Information     Follow up with [facility].    Specialty:  Emergency Medicine    Why:  As needed    Contact information:    [address] [place] [id number] [phone]      [facility]     [facility] Nurse Care Line -  Assistance  Registered nurses in the [facility] provide clinical advisement, health education, appointment booking, and other advisory services.  Call for this free service at [phone].             Medications           Message regarding Medications     Verify the changes and/or additions to your medication regime listed below are the same as discussed with your clinician today.  If any of these changes or additions are incorrect, please notify your healthcare provider.        These medications were administered today        Dose Freq    cefTRIAXone (ROCEPHIN) 1 g in dextrose 5 % 50 mL IVPB 1 g ED 1 Time    Sig: Inject 50 mLs (1 g total) into the vein ED 1 Time.    Class: Normal    Route: Intravenous           Verify that the below list of medications is an accurate representation of the medications you are currently taking.  If none reported, the list may be blank. If incorrect, please contact your  healthcare provider. Carry this list with you in case of emergency.           Current Medications     acetaminophen (TYLENOL) 650 MG TbSR 1 tablet (650 mg total) by Per J Tube route every 6 (six) hours as needed (fever).    acetylcysteine 200 mg/ml, 20%, (MUCOMYST) 200 mg/mL (20 %) nebulizer solution Take 4 mLs by nebulization every 6 (six) hours.     albuterol (ACCUNEB) 1.25 mg/3 mL Nebu Take 2.5 mg by nebulization every 6 (six) hours.    amlodipine (NORVASC) 10 MG tablet 1 tablet (10 mg total) by Per J Tube route once daily.    bisacodyl (DULCOLAX) 10 mg Supp Place 1 suppository (10 mg total) rectally daily as needed (daily as needed to ensure at least one bowel movement each day).    carvedilol (COREG) 12.5 MG tablet 1 tablet (12.5 mg total) by Per J Tube route 2 (two) times daily.    ceFEPIme in dextrose 5% (MAXIPIME) 2 gram/50 mL PgBk Inject 50 mLs (2 g total) into the vein every 12 (twelve) hours.    cefTRIAXone (ROCEPHIN) 1 g in dextrose 5 % 50 mL IVPB Inject 50 mLs (1 g total) into the vein ED 1 Time.    chlorhexidine (PERIDEX) 0.12 % solution Use as directed 15 mLs in the mouth or throat 2 (two) times daily.    dextrose 5 % SolP 100 mL with tigecycline 50 mg SolR 50 mg Starting on Feb 28, 2017. Inject 50 mg into the vein every 12 (twelve) hours.    enoxaparin (LOVENOX) 40 mg/0.4 mL Syrg Inject 0.4 mLs (40 mg total) into the skin once daily.    famotidine (PEPCID) 40 mg/5 mL (8 mg/mL) suspension 2.5 mLs (20 mg total) by Per J Tube route 2 (two) times daily.    ferrous sulfate 300 mg (60 mg iron)/5 mL syrup 5 mLs (300 mg total) by Per J Tube route 2 (two) times daily. Give with vitamin c.    glycopyrrolate (ROBINUL) 1 mg Tab 1 tablet (1 mg total) by Per J Tube route 3 (three) times daily.    levetiracetam (KEPPRA) 100 mg/mL Soln 5 mLs (500 mg total) by Per J Tube route 2 (two) times daily.    LIDOCAINE HCL/PF (LIDOCAINE, PF,) 200 mg/20 mL (1 %) Syrg Take 2 mLs by nebulization every 6 (six) hours as needed  "(cough).    miconazole NITRATE 2 % (MICOTIN) 2 % top powder Apply topically to perineal area BID    miconazole nitrate 2% (MICOTIN) 2 % Oint Apply topically 2 (two) times daily. Apply to perineal areas    polyethylene glycol (GLYCOLAX) 17 gram PwPk 17 g by Per J Tube route once daily.    rosuvastatin (CRESTOR) 5 MG tablet 1 tablet (5 mg total) by Per J Tube route once daily.    Saccharomyces boulardii (FLORASTOR) 250 mg capsule 250 mg by Per G Tube route 2 (two) times daily.    selenium sulfide (SELSUN) 1 % Sham Three times weekly with bath.  Apply to hair.           Clinical Reference Information           Your Vitals Were     BP Pulse Resp Height Weight SpO2    115/73 73 18 5' 9" (1.753 m) 68 kg (150 lb) 100%    BMI                22.15 kg/m2          Allergies as of 2/16/2017     No Known Allergies      Immunizations Administered on Date of Encounter - 2/16/2017     None      ED Micro, Lab, POCT     Start Ordered       Status Ordering Provider    02/16/17 0849 02/16/17 0848  Urine culture  STAT      In process     02/16/17 0759 02/16/17 0758  Troponin I  STAT      Final result     02/16/17 0613 02/16/17 0613  Urinalysis - Cath.  Once      Final result     02/16/17 0613 02/16/17 0613  Urine culture  STAT      In process     02/16/17 0613 02/16/17 0613  Influenza antigen Nasal Swab  STAT      Final result     02/16/17 0613 02/16/17 0613  Blood culture #1  STAT     Comments:  Blood Culture #1    In process     02/16/17 0613 02/16/17 0613  Blood culture #2  STAT     Comments:  Blood Culture #2    In process     02/16/17 0613 02/16/17 0613  Protime-INR  STAT      Final result     02/16/17 0613 02/16/17 0613  Magnesium  Once      Final result     02/16/17 0613 02/16/17 0613  Lipase  STAT      Final result     02/16/17 0613 02/16/17 0613  Lactic acid, plasma  STAT      Final result     02/16/17 0613 02/16/17 0613  Urinalysis Microscopic  Once      Final result     02/16/17 0612 02/16/17 0613  CBC auto differential  STAT "      Final result     02/16/17 0612 02/16/17 0613  Comprehensive metabolic panel  STAT      Final result       ED Imaging Orders     Start Ordered       Status Ordering Provider    02/16/17 0613 02/16/17 0613  X-Ray Abdomen Flat And Erect  1 time imaging      Final result     02/16/17 0613 02/16/17 0613  CT Head Without Contrast  1 time imaging      Final result     02/16/17 0612 02/16/17 0613  X-Ray Chest AP Portable  1 time imaging      Final result         Discharge Instructions       Continue current iv abx. No sign of any acute findings in the ER today    [facility] Sign-Up     Activating your [facility] account is as easy as 1-2-3!     1) Visit [email or web address], select Sign Up Now, enter this activation code and your date of birth, then select Next.  [email or web address]  Expires: 3/31/2017  2:07 PM      2) Create a username and password to use when you visit [facility] in the future and select a security question in case you lose your password and select Next.    3) Enter your e-mail address and click Sign Up!    Additional Information  If you have questions, please e-mail [email or web address] or call [phone] to talk to our [facility] staff. Remember, [facility] is NOT to be used for urgent needs. For medical emergencies, dial 911.          [facility] complies with applicable Federal civil rights laws and does not discriminate on the basis of race, color, national origin, age, disability, or sex.        Language Assistance Services     ATTENTION: Language assistance services are available, free of charge. Please call [phone].      ATENCIÓN: Si habla español, tiene a small disposición servicios gratuitos de asistencia lingüística. Llame al [phone].     CHÚ Ý: N?u b?n nói Ti?ng Vi?t, có các d?ch v? h? tr? ngôn ng? mi?n phí dành cho b?n. G?i s? [phone].

## 2017-02-17 LAB — BACTERIA UR CULT: NO GROWTH

## 2017-02-17 NOTE — ED PROVIDER NOTES
Encounter Date: 2/16/2017    SCRIBE #1 NOTE: I, Shayna Rainey, am scribing for, and in the presence of,  Dr. Mora. I have scribed the entire note.       History     Chief Complaint   Patient presents with    Altered Mental Status     transferred from [place] for decreased LOC and 75% Sats     Review of patient's allergies indicates:  No Known Allergies  HPI Comments: 02/16/2017  7:23 PM     Chief Complaint: hypoxia      The patient is a 66 y.o. male who is presenting with low o2 sats from [facility]. Pt is also reportedly altered however he is altered at baseline. Hx is limited due to pt's condition. He has a past medical history of Anoxic brain injury (7/25/2015); Chronic kidney disease; Decubitus ulcer of ankle, unstageable; Decubitus ulcer of sacral region, stage 3; Decubitus ulcer, unstageable; Diabetes mellitus; Dissecting aneurysm of thoracoabdominal aorta (7/25/2015); Encounter for blood transfusion; H/O renal calculi; History of ATN; Hyperlipidemia; Hypertension; Seizures; Severe anemia; Stroke (7/25/2015); and Ventilator dependent.  has a past surgical history that includes Tracheostomy tube placement; Gastrostomy tube placement; Colonoscopy (N/A, 5/16/2016); and Jejunostomy.      [facility].     Past Medical History   Diagnosis Date    Anoxic brain injury 7/25/2015     during cardiac arrest    Chronic kidney disease     Decubitus ulcer of ankle, unstageable      outer right ankle    Decubitus ulcer of sacral region, stage 3     Decubitus ulcer, unstageable      left ear upper with blister to lower ear lobe    Diabetes mellitus      type II    Dissecting aneurysm of thoracoabdominal aorta 7/25/2015    Encounter for blood transfusion     H/O renal calculi     History of ATN      r/t hypoperfusion    Hyperlipidemia     Hypertension     Seizures     Severe anemia     Stroke 7/25/2015     bilateral watershed infarcts    Ventilator dependent       No past medical history pertinent negatives.  Past Surgical History   Procedure Laterality Date    Tracheostomy tube placement      Gastrostomy tube placement      Colonoscopy N/A 5/16/2016     Procedure: COLONOSCOPY;  Surgeon: Wei Moncada MD;  Location: [facility];  Service: Endoscopy;  Laterality: N/A;    Jejunostomy       History reviewed. No pertinent family history.  Social History   Substance Use Topics    Smoking status: Never Smoker    Smokeless tobacco: None    Alcohol use No     Review of Systems   Unable to perform ROS: Dementia       Physical Exam   Initial Vitals   BP Pulse Resp Temp SpO2   02/16/17 1841 02/16/17 1837 02/16/17 1837 02/16/17 1841 02/16/17 1836   128/81 93 16 97.9 °F (36.6 °C) 91 %     Physical Exam    Nursing note and vitals reviewed.  Constitutional: He appears well-developed and well-nourished.   HENT:   Head: Normocephalic and atraumatic.   Eyes: Conjunctivae are normal.   Cardiovascular: Normal rate, regular rhythm, normal heart sounds and intact distal pulses. Exam reveals no gallop and no friction rub.    No murmur heard.  Pulmonary/Chest: Breath sounds normal.   PT on ventilator with a trach.    Abdominal: Soft. Bowel sounds are normal. He exhibits no distension and no mass. There is no tenderness. There is no rebound and no guarding.   Rectal tube in place.    Neurological: He is alert.   Skin: Skin is warm.   Stage I sacral decubitus ulcer    Psychiatric: He has a normal mood and affect.         ED Course   Procedures  Labs Reviewed   COMPREHENSIVE METABOLIC PANEL - Abnormal; Notable for the following:        Result Value    CO2 22 (*)     Glucose 131 (*)     BUN, Bld 40 (*)     Total Protein 8.8 (*)     Albumin 2.2 (*)     All other components within normal limits   CBC W/ AUTO DIFFERENTIAL - Abnormal; Notable for the following:     RBC 4.05 (*)     Hemoglobin 10.3 (*)     Hematocrit 33.1 (*)     MCH 25.5 (*)     MCHC 31.3 (*)     RDW 18.4 (*)     MPV 7.8 (*)      Gran # 7.9 (*)     All other components within normal limits   URINALYSIS - Abnormal; Notable for the following:     Appearance, UA Hazy (*)     Specific Gravity, UA >=1.030 (*)     Protein, UA 2+ (*)     Ketones, UA Trace (*)     Occult Blood UA 3+ (*)     All other components within normal limits   URINALYSIS MICROSCOPIC - Abnormal; Notable for the following:     RBC, UA 50 (*)     Bacteria, UA Moderate (*)     Yeast, UA Many (*)     All other components within normal limits             Medical Decision Making:   History:   Old Records Summarized: records from previous admission(s).  Independently Interpreted Test(s):   I have ordered and independently interpreted X-rays - see summary below.       <> Summary of X-Ray Reading(s): chest x-ray interpreted by me reveals no infiltrates, effusions or mediastinal widening  Clinical Tests:   Lab Tests: Ordered and Reviewed  The following lab test(s) were unremarkable: CBC, CMP and Urinalysis  ED Management:  Emilie Proctor Sr. is a 66 y.o. male who presents with  Transient hypoxia which is resolved.there is no evidence of pneumonia, congestive heart failure, pneumoth or pulmonary embolism.the patient is at baseline mental   With no evidence of infectious process  There is no fever or leukocytos            Scribe Attestation:   Scribe #1: I performed the above scribed service and the documentation accurately describes the services I performed. I attest to the accuracy of the note.    Attending Attestation:           Physician Attestation for Scribe:  Physician Attestation Statement for Scribe #1: I, Dr. Mora, reviewed documentation, as scribed by Shayna Rainey in my presence, and it is both accurate and complete.                 ED Course     Clinical Impression:   The primary encounter diagnosis was Dehydration. Diagnoses of Confusion and Hypoxia were also pertinent to this visit.          Maynor Mora III, MD  02/16/17 5510       Maynor Mora III,  MD  05/04/17 0306

## 2017-02-17 NOTE — PROGRESS NOTES
02/16/17 1837   Patient Assessment/Suction   Respiratory Effort Unlabored   All Lung Fields Breath Sounds clear   Cough Frequency infrequent   Cough Type none   PRE-TX-O2-ETCO2   O2 Device (Oxygen Therapy) ventilator   $ Is the patient on Oxygen? Yes   Oxygen Concentration (%) 40   SpO2 100 %   Pulse Oximetry Type Continuous   $ Pulse Oximetry - Multiple Charge Pulse Oximetry - Multiple   Pulse 93   Resp 16       Surgical Airway Shiley Long;Distal;Cuffed   No Placement Date or Time found.   Present Prior to Hospital Arrival?: Yes  Type: Tracheostomy  Brand: Shiley  Airway Device Size: 7.0  Style: Long;Distal;Cuffed   Site Assessment Clean;Dry;No bleeding;No drainage   Ties Assessment Clean;Dry;Intact;Secure   Vent Select   Conventional Vent Y   $ Ventilator Charges Ventilator Initial   Preset Conventional Ventilator Settings   Vent Type    Ventilation Type VC  (A/C)   Vent Mode A/C   Humidity HME   Set Rate 16 bmp   Vt Set 500 mL   PEEP/CPAP 5 cmH20   Waveform RAMP   Peak Flow 60 L/min   Patient Ventilator Parameters   Resp Rate Total 25 br/min   All Fields Breath Sounds clear   Peak Airway Pressure 34 cmH2O   Mean Airway Pressure 12 cmH20   Exhaled Vt 527 mL   Total Ve 13 mL   Conventional Ventilator Alarms   Alarms On Y   Ve High Alarm 24.5 L/min   Ve Low Alarm 5.5 L/min   Vt High Alarm 1040 mL   Vt Low Alarm 325 mL   Resp Rate High Alarm 40 br/min   Resp Rate Low Alarm 0   Press High Alarm 40 cmH2O   Press Low Alarm 0 cmH2O   Ready to Wean/Extubation Screen   FIO2<60 (chart decimal) 0.4   MV<16L (chart vol.) 13   PEEP <10 (chart #) 5   Ready to Wean Parameters   F/VT Ratio<105 (RSBI) (!) 30.36       Patient brought to ER from [place] and placed on ventilator at documented settings.

## 2017-02-17 NOTE — DISCHARGE INSTRUCTIONS
Dehydration (Adult)  Dehydration occurs when your body loses too much fluid. This may be the result of prolonged vomiting or diarrhea, excessive sweating, or a high fever. It may also happen if you dont drink enough fluid when youre sick or out in the heat. Misuse of diuretics (water pills) can also be a cause.  Symptoms include thirst and decreased urine output. You may also feel dizzy, weak, fatigued, or very drowsy. The diet described below is usually enough to treat dehydration. In some cases, you may need medicine.  Home care  · Drink at least 12 8-ounce glasses of fluid every day to resolve the dehydration. Fluid may include water; orange juice; lemonade; apple, grape, or cranberry juice; clear fruit drinks; electrolyte replacement and sports drinks; and teas and coffee without caffeine. If you have been diagnosed with a kidney disease, ask your doctor how much and what types of fluids you should drink to prevent dehydration. If you have kidney disease, fluid can build up in the body. This can be dangerous to your health.  · If you have a fever, muscle aches, or a headache as a result of a cold or flu, you may take acetaminophen or ibuprofen, unless another medicine was prescribed. If you have chronic liver or kidney disease, or have ever had a stomach ulcer or gastrointestinal bleeding, talk with your health care provider before using these medicines. Don't take aspirin if you are younger than 18 and have a fever. Aspirin raises the chance for severe liver injury.  Follow-up care  Follow up with your health care provider, or as advised.  When to seek medical advice  Call your health care provider right away if any of these occur:  · Continued vomiting  · Frequent diarrhea (more than 5 times a day); blood (red or black color) or mucus in diarrhea  · Blood in vomit or stool  · Swollen abdomen or increasing abdominal pain  · Weakness, dizziness, or fainting  · Unusual drowsiness or confusion  · Reduced urine  output or extreme thirst  · Fever of 100.4°F (34°C) or higher  Date Last Reviewed: 5/31/2015  © 1147-1539 [employer] [address] [place] [address], [place] [address]. All rights reserved. This information is not intended as a substitute for professional medical care. Always follow your healthcare professional's instructions.          Altered Level of Consciousness  Level of consciousness (LOC) is a measure of a persons ability to interact with other people and to react to the surroundings. A person with an altered level of consciousness may not respond to touch or voices. He or she may look vacant or blank and may not make eye contact with others. He or she may be limp and may not move for a long time or show little interest in moving. He or she may also be confused.  There are many causes of altered LOC. They include low blood sugar, infection, medicines, injuries, or other medical problems.  Altered LOC is a medical emergency. The healthcare provider will do tests to help find the cause. These may include blood tests and imaging tests. The person is treated so breathing and heart rate are stable. An intravenous (IV) line may be put into a vein in the arm or hand to give medicines. Once the cause of altered LOC is found, the goal is to treat the cause.  In almost all cases, the person will be admitted to the hospital for observation.  Home care  When your loved one is released from the hospital, you will be given guidelines for caring for him or her. In general:  · Follow the healthcare provider's instructions for giving any prescribed medicines to your child.  · Stay with your loved one or have another responsible adult look after him or her. Watch carefully for any return of symptoms or changes in behavior.  · If the person has diabetes, make sure that any approved medicines are given on time and as prescribed.  Follow-up care  Follow up with the healthcare provider or our staff.  When to seek medical  advice  Call the healthcare provider right away for any of the following:  · Symptoms of altered LOC return  · New symptoms appear  Date Last Reviewed: 8/23/2015  © 8763-9716 [employer] [address] [place] [address], [place] [address]. All rights reserved. This information is not intended as a substitute for professional medical care. Always follow your healthcare professional's instructions.

## 2017-02-21 LAB
BACTERIA BLD CULT: NORMAL
BACTERIA BLD CULT: NORMAL

## 2017-05-17 NOTE — ED PROVIDER NOTES
Encounter Date: 2/16/2017       History     Chief Complaint   Patient presents with    Respiratory Distress     vent.pt sent from [place] for eval of resp distress from [place]     Review of patient's allergies indicates:  No Known Allergies  HPI Comments: Pt is 68 y/o vent dependent presenting from [place] with reports of subjective respiratory distress. Currently patient cannot contribute to this history and was received in the department with complaints respiratory distress while mech ventilated, reports of worsening recent abdominal distension requiring more frequent decompression through his G tube, and decreased alertness from baseline are additional concerning symptoms.    The history is provided by the EMS personnel.     Past Medical History:   Diagnosis Date    Anoxic brain injury 7/25/2015    during cardiac arrest    Chronic kidney disease     Decubitus ulcer of ankle, unstageable     outer right ankle    Decubitus ulcer of sacral region, stage 3     Decubitus ulcer, unstageable     left ear upper with blister to lower ear lobe    Diabetes mellitus     type II    Dissecting aneurysm of thoracoabdominal aorta 7/25/2015    Encounter for blood transfusion     H/O renal calculi     History of ATN     r/t hypoperfusion    Hyperlipidemia     Hypertension     Seizures     Severe anemia     Stroke 7/25/2015    bilateral watershed infarcts    Ventilator dependent      Past Surgical History:   Procedure Laterality Date    COLONOSCOPY N/A 5/16/2016    Procedure: COLONOSCOPY;  Surgeon: Wei Moncada MD;  Location: [facility];  Service: Endoscopy;  Laterality: N/A;    GASTROSTOMY TUBE PLACEMENT      JEJUNOSTOMY      TRACHEOSTOMY TUBE PLACEMENT       No family history on file.  Social History   Substance Use Topics    Smoking status: Never Smoker    Smokeless tobacco: Not on file    Alcohol use No     Review of Systems   Unable to perform ROS: Intubated       Physical Exam   Initial Vitals    BP Pulse Resp Temp SpO2   02/16/17 0556 02/16/17 0556 02/16/17 0556 -- 02/16/17 0556   111/71 93 18  100 %     Physical Exam    Nursing note and vitals reviewed.  Constitutional: He appears well-nourished. No distress.   HENT:   Head: Normocephalic and atraumatic.   Trach placed   Eyes: Conjunctivae and EOM are normal.   Neck: Normal range of motion. Neck supple.   Cardiovascular: Normal rate and regular rhythm.   Pulmonary/Chest: No respiratory distress. He has no wheezes. He has no rales.   Abdominal: Soft. Bowel sounds are normal. He exhibits no distension.   Musculoskeletal: Normal range of motion. He exhibits tenderness. He exhibits no edema.   Neurological: No cranial nerve deficit.   Skin: Skin is warm and dry. No rash noted.         ED Course   Procedures  Labs Reviewed   CBC W/ AUTO DIFFERENTIAL - Abnormal; Notable for the following:        Result Value    WBC 15.00 (*)     RBC 3.80 (*)     Hemoglobin 10.0 (*)     Hematocrit 31.5 (*)     MCH 26.4 (*)     MCHC 31.8 (*)     RDW 18.4 (*)     MPV 7.7 (*)     Gran # 12.4 (*)     Gran% 82.7 (*)     Lymph% 13.0 (*)     All other components within normal limits   COMPREHENSIVE METABOLIC PANEL - Abnormal; Notable for the following:     CO2 19 (*)     Glucose 146 (*)     BUN, Bld 34 (*)     Total Protein 8.5 (*)     Albumin 2.1 (*)     All other components within normal limits   URINALYSIS - Abnormal; Notable for the following:     Color, UA Brown (*)     Appearance, UA Cloudy (*)     Specific Gravity, UA >=1.030 (*)     Protein, UA 2+ (*)     Ketones, UA Trace (*)     Bilirubin (UA) 1+ (*)     Occult Blood UA 3+ (*)     Nitrite, UA Positive (*)     Leukocytes, UA Trace (*)     All other components within normal limits   PROTIME-INR - Abnormal; Notable for the following:     Prothrombin Time 13.0 (*)     INR 1.3 (*)     All other components within normal limits   LIPASE - Abnormal; Notable for the following:     Lipase 286 (*)     All other components within normal  limits   URINALYSIS MICROSCOPIC - Abnormal; Notable for the following:     RBC, UA >100 (*)     WBC, UA 8 (*)     Bacteria, UA Moderate (*)     Hyaline Casts, UA 2 (*)     Granular Casts, UA 1 (*)     All other components within normal limits   CULTURE, URINE   CULTURE, BLOOD    Narrative:     Blood Culture #1   CULTURE, BLOOD    Narrative:     Blood Culture #2   INFLUENZA A AND B ANTIGEN   MAGNESIUM   LACTIC ACID, PLASMA   TROPONIN I        ECG Results         EKG 12-lead (Final result) Result time:  02/16/17 08:54:45    Final result by Interface, Lab In [facility] (02/16/17 08:54:45)    Narrative:    Test Reason : R06.02    Vent. Rate : 092 BPM     Atrial Rate : 092 BPM     P-R Int : 142 ms          QRS Dur : 084 ms      QT Int : 362 ms       P-R-T Axes : 052 041 067 degrees     QTc Int : 447 ms    Normal sinus rhythm  Low voltage QRS  Borderline Abnormal ECG  When compared with ECG of 14-FEB-2017 18:50,  No significant change was found  Confirmed by Koko Walter MD ([id number]) on 2/16/2017 8:54:36 AM    Referred By: SELF REFERRAL           Confirmed By:Koko Walter MD               Medical Decision Making:   Initial Assessment:   Screening labs and imaging obtained to rule out central, infectious, metabolic contributing causes. At the time of sign out to Dr. Tadeo, CT was noted to be negative for acute changes. Further infectious studies pending. Patient warrants ongoing monitoring in the department.     ED Management:  Pt was reviewed with Dr Chicas by Dr. Tadeo. Currently on IV merrem and no overt need for admission at this time. Recommendations are for the patient to return to [facility] on continuing ABx therapy. Asked to return for any new, worsening or concerning symptoms.                   ED Course     Clinical Impression:   The encounter diagnosis was Urinary tract infection without hematuria, site unspecified.    Disposition:   Disposition: Discharged  Condition: Stable       Cristian Amato MD  05/17/17 3320

## 2020-12-31 NOTE — PROGRESS NOTES
02/14/17 0000   Patient Assessment/Suction   Level of Consciousness (AVPU) responds to voice   Respiratory Effort Unlabored   Expansion/Accessory Muscles/Retractions no use of accessory muscles   All Lung Fields Breath Sounds coarse   Rhythm/Pattern, Respiratory ventilator assisted   Cough Frequency frequent   Cough Type good;productive   Suction Method in-line suction catheter (closed);oral   $ Suction Charges Airway Maintenance Stat Charge   Sputum Amount moderate   Sputum Color white   Sputum Consistency thick   PRE-TX-O2-ETCO2   O2 Device (Oxygen Therapy) ventilator   $ Is the patient on Oxygen? Yes   Oxygen Concentration (%) 30   SpO2 97 %   Pulse Oximetry Type Continuous   $ Pulse Oximetry - Multiple Charge Pulse Oximetry - Multiple   ETCO2 (mmHg) 28 mmHg   Pulse 65   Resp (!) 22   Temp 98.6 °F (37 °C)   /81       Surgical Airway Shiley Long;Distal;Cuffed   No Placement Date or Time found.   Present Prior to Hospital Arrival?: Yes  Type: Tracheostomy  Brand: Shiley  Airway Device Size: 7.0  Style: Long;Distal;Cuffed   Cuff Pressure 30 cm H2O   Status Secured   Site Assessment Clean;Dry;No drainage;No bleeding   Site Care Protective barrier to skin   Inner Cannula Care Cleansed/dried   Ties Assessment Clean;Dry;Intact;Secure   Respiratory Interventions   Airway/Ventilation Management airway patency maintained   Respiratory Support airway management;HOB elevated   VAP Prevention HOB elevation maintained;oral care regularly provided;VTE prophylaxis provided   Vent Select   Conventional Vent Y   IHI Ventilator Associated Pneumonia Bundle   Daily Awakening Trials Performed Not applicable   Oral Care with mouthwash;with half strength hydrogen peroxide;Mouth suctioned;Mouth moisturizer   Preset Conventional Ventilator Settings   Vent Type    Ventilation Type VC   Vent Mode A/C   Humidity HME   Set Rate 22 bmp   Vt Set 500 mL   PEEP/CPAP 5 cmH20   Pressure Support 0 cmH20   Waveform RAMP   Peak Flow 75  L/min   Set Inspiratory Pressure 0 cmH20   Insp Time 0 Sec(s)   Plateau Set/Insp. Hold (sec) 0   Insp Rise Time  0 %   Trigger Sensitivity Flow/I-Trigger 1.5 L/min   P High 0 cm H2O   P Low 0 cm H2O   T High 0 sec   T Low 0 sec   Patient Ventilator Parameters   Resp Rate Total 22 br/min   Peak Airway Pressure 25 cmH2O   Mean Airway Pressure 11 cmH20   Exhaled Vt 517 mL   Total Ve 11.4 mL   Spont Ve 0 L   I:E Ratio Measured 1:2.80   Conventional Ventilator Alarms   Alarms On Y   Ve High Alarm 24 L/min   Resp Rate High Alarm 40 br/min   Press High Alarm 60 cmH2O   Apnea Rate 10   Apnea Volume (mL) 500 mL   Apnea Oxygen Concentration  100   Apnea Flow Rate (L/min) 60   T Apnea 20 sec(s)      Pt with left thigh pain; eval for DVT. Will send for doppler. If negative; likely supportive care for msk pain. If positive, will likely discuss with surgeon if surgery should be given.

## 2025-05-21 NOTE — PLAN OF CARE
Problem: Patient Care Overview  Goal: Individualization & Mutuality  Outcome: Ongoing (interventions implemented as appropriate)  Pt with no changes in assessment; pt had temp 102 (o); tylenol given as ordered; temp at 0300 99 degrees;without complaints; pt able to make needs known       Assessment and education.